# Patient Record
Sex: MALE | Race: WHITE | NOT HISPANIC OR LATINO
[De-identification: names, ages, dates, MRNs, and addresses within clinical notes are randomized per-mention and may not be internally consistent; named-entity substitution may affect disease eponyms.]

---

## 2017-11-30 ENCOUNTER — APPOINTMENT (OUTPATIENT)
Dept: NEUROLOGY | Facility: CLINIC | Age: 82
End: 2017-11-30

## 2018-03-01 ENCOUNTER — INPATIENT (INPATIENT)
Facility: HOSPITAL | Age: 83
LOS: 4 days | Discharge: HOME | DRG: 391 | End: 2018-03-06
Attending: INTERNAL MEDICINE | Admitting: INTERNAL MEDICINE
Payer: MEDICARE

## 2018-03-01 VITALS
HEIGHT: 71 IN | OXYGEN SATURATION: 97 % | SYSTOLIC BLOOD PRESSURE: 148 MMHG | RESPIRATION RATE: 16 BRPM | HEART RATE: 119 BPM | TEMPERATURE: 98 F | WEIGHT: 169.98 LBS | DIASTOLIC BLOOD PRESSURE: 87 MMHG

## 2018-03-01 DIAGNOSIS — K46.9 UNSPECIFIED ABDOMINAL HERNIA WITHOUT OBSTRUCTION OR GANGRENE: Chronic | ICD-10-CM

## 2018-03-01 DIAGNOSIS — K26.5 CHRONIC OR UNSPECIFIED DUODENAL ULCER WITH PERFORATION: ICD-10-CM

## 2018-03-01 DIAGNOSIS — N40.0 BENIGN PROSTATIC HYPERPLASIA WITHOUT LOWER URINARY TRACT SYMPTOMS: ICD-10-CM

## 2018-03-01 DIAGNOSIS — F41.9 ANXIETY DISORDER, UNSPECIFIED: ICD-10-CM

## 2018-03-01 DIAGNOSIS — K57.92 DIVERTICULITIS OF INTESTINE, PART UNSPECIFIED, WITHOUT PERFORATION OR ABSCESS WITHOUT BLEEDING: ICD-10-CM

## 2018-03-01 DIAGNOSIS — K63.9 DISEASE OF INTESTINE, UNSPECIFIED: ICD-10-CM

## 2018-03-01 LAB
ALBUMIN SERPL ELPH-MCNC: 3.2 G/DL — LOW (ref 3.3–5)
ALP SERPL-CCNC: 81 U/L — SIGNIFICANT CHANGE UP (ref 40–120)
ALT FLD-CCNC: 24 U/L — SIGNIFICANT CHANGE UP (ref 12–78)
AMYLASE P1 CFR SERPL: 30 U/L — SIGNIFICANT CHANGE UP (ref 25–115)
ANION GAP SERPL CALC-SCNC: 7 MMOL/L — SIGNIFICANT CHANGE UP (ref 5–17)
APPEARANCE UR: CLEAR — SIGNIFICANT CHANGE UP
AST SERPL-CCNC: 22 U/L — SIGNIFICANT CHANGE UP (ref 15–37)
BILIRUB SERPL-MCNC: 1.1 MG/DL — SIGNIFICANT CHANGE UP (ref 0.2–1.2)
BILIRUB UR-MCNC: NEGATIVE — SIGNIFICANT CHANGE UP
BUN SERPL-MCNC: 13 MG/DL — SIGNIFICANT CHANGE UP (ref 7–23)
CALCIUM SERPL-MCNC: 8.5 MG/DL — SIGNIFICANT CHANGE UP (ref 8.5–10.1)
CHLORIDE SERPL-SCNC: 111 MMOL/L — HIGH (ref 96–108)
CO2 SERPL-SCNC: 24 MMOL/L — SIGNIFICANT CHANGE UP (ref 22–31)
COLOR SPEC: YELLOW — SIGNIFICANT CHANGE UP
CREAT SERPL-MCNC: 1.3 MG/DL — SIGNIFICANT CHANGE UP (ref 0.5–1.3)
DIFF PNL FLD: NEGATIVE — SIGNIFICANT CHANGE UP
GLUCOSE SERPL-MCNC: 119 MG/DL — HIGH (ref 70–99)
GLUCOSE UR QL: NEGATIVE — SIGNIFICANT CHANGE UP
HCT VFR BLD CALC: 41.2 % — SIGNIFICANT CHANGE UP (ref 39–50)
HGB BLD-MCNC: 14.8 G/DL — SIGNIFICANT CHANGE UP (ref 13–17)
KETONES UR-MCNC: ABNORMAL
LEUKOCYTE ESTERASE UR-ACNC: ABNORMAL
LIDOCAIN IGE QN: 96 U/L — SIGNIFICANT CHANGE UP (ref 73–393)
MCHC RBC-ENTMCNC: 31.6 PG — SIGNIFICANT CHANGE UP (ref 27–34)
MCHC RBC-ENTMCNC: 35.8 GM/DL — SIGNIFICANT CHANGE UP (ref 32–36)
MCV RBC AUTO: 88.2 FL — SIGNIFICANT CHANGE UP (ref 80–100)
NITRITE UR-MCNC: NEGATIVE — SIGNIFICANT CHANGE UP
PH UR: 6.5 — SIGNIFICANT CHANGE UP (ref 5–8)
PLATELET # BLD AUTO: 203 K/UL — SIGNIFICANT CHANGE UP (ref 150–400)
POTASSIUM SERPL-MCNC: 3.4 MMOL/L — LOW (ref 3.5–5.3)
POTASSIUM SERPL-SCNC: 3.4 MMOL/L — LOW (ref 3.5–5.3)
PROCALCITONIN SERPL-MCNC: <0.05 — SIGNIFICANT CHANGE UP (ref 0–0.04)
PROT SERPL-MCNC: 6.5 G/DL — SIGNIFICANT CHANGE UP (ref 6–8.3)
PROT UR-MCNC: NEGATIVE — SIGNIFICANT CHANGE UP
RBC # BLD: 4.67 M/UL — SIGNIFICANT CHANGE UP (ref 4.2–5.8)
RBC # FLD: 12.1 % — SIGNIFICANT CHANGE UP (ref 10.3–14.5)
SODIUM SERPL-SCNC: 142 MMOL/L — SIGNIFICANT CHANGE UP (ref 135–145)
SP GR SPEC: 1.01 — SIGNIFICANT CHANGE UP (ref 1.01–1.02)
UROBILINOGEN FLD QL: NEGATIVE — SIGNIFICANT CHANGE UP
WBC # BLD: 9.9 K/UL — SIGNIFICANT CHANGE UP (ref 3.8–10.5)
WBC # FLD AUTO: 9.9 K/UL — SIGNIFICANT CHANGE UP (ref 3.8–10.5)

## 2018-03-01 PROCEDURE — 74177 CT ABD & PELVIS W/CONTRAST: CPT | Mod: 26

## 2018-03-01 PROCEDURE — 99285 EMERGENCY DEPT VISIT HI MDM: CPT

## 2018-03-01 PROCEDURE — 99223 1ST HOSP IP/OBS HIGH 75: CPT | Mod: AI

## 2018-03-01 RX ORDER — IOHEXOL 300 MG/ML
30 INJECTION, SOLUTION INTRAVENOUS ONCE
Qty: 0 | Refills: 0 | Status: COMPLETED | OUTPATIENT
Start: 2018-03-01 | End: 2018-03-01

## 2018-03-01 RX ORDER — FAMOTIDINE 10 MG/ML
20 INJECTION INTRAVENOUS ONCE
Qty: 0 | Refills: 0 | Status: COMPLETED | OUTPATIENT
Start: 2018-03-01 | End: 2018-03-01

## 2018-03-01 RX ORDER — ONDANSETRON 8 MG/1
4 TABLET, FILM COATED ORAL ONCE
Qty: 0 | Refills: 0 | Status: COMPLETED | OUTPATIENT
Start: 2018-03-01 | End: 2018-03-01

## 2018-03-01 RX ORDER — PIPERACILLIN AND TAZOBACTAM 4; .5 G/20ML; G/20ML
3.38 INJECTION, POWDER, LYOPHILIZED, FOR SOLUTION INTRAVENOUS ONCE
Qty: 0 | Refills: 0 | Status: COMPLETED | OUTPATIENT
Start: 2018-03-01 | End: 2018-03-01

## 2018-03-01 RX ORDER — BUPROPION HYDROCHLORIDE 150 MG/1
150 TABLET, EXTENDED RELEASE ORAL
Qty: 0 | Refills: 0 | Status: DISCONTINUED | OUTPATIENT
Start: 2018-03-01 | End: 2018-03-06

## 2018-03-01 RX ORDER — PIPERACILLIN AND TAZOBACTAM 4; .5 G/20ML; G/20ML
3.38 INJECTION, POWDER, LYOPHILIZED, FOR SOLUTION INTRAVENOUS EVERY 8 HOURS
Qty: 0 | Refills: 0 | Status: DISCONTINUED | OUTPATIENT
Start: 2018-03-01 | End: 2018-03-06

## 2018-03-01 RX ORDER — HEPARIN SODIUM 5000 [USP'U]/ML
5000 INJECTION INTRAVENOUS; SUBCUTANEOUS EVERY 12 HOURS
Qty: 0 | Refills: 0 | Status: DISCONTINUED | OUTPATIENT
Start: 2018-03-01 | End: 2018-03-06

## 2018-03-01 RX ORDER — SODIUM CHLORIDE 9 MG/ML
1000 INJECTION INTRAMUSCULAR; INTRAVENOUS; SUBCUTANEOUS ONCE
Qty: 0 | Refills: 0 | Status: COMPLETED | OUTPATIENT
Start: 2018-03-01 | End: 2018-03-01

## 2018-03-01 RX ORDER — TAMSULOSIN HYDROCHLORIDE 0.4 MG/1
0.4 CAPSULE ORAL AT BEDTIME
Qty: 0 | Refills: 0 | Status: DISCONTINUED | OUTPATIENT
Start: 2018-03-01 | End: 2018-03-06

## 2018-03-01 RX ORDER — PANTOPRAZOLE SODIUM 20 MG/1
40 TABLET, DELAYED RELEASE ORAL EVERY 12 HOURS
Qty: 0 | Refills: 0 | Status: DISCONTINUED | OUTPATIENT
Start: 2018-03-01 | End: 2018-03-05

## 2018-03-01 RX ORDER — ATORVASTATIN CALCIUM 80 MG/1
10 TABLET, FILM COATED ORAL AT BEDTIME
Qty: 0 | Refills: 0 | Status: DISCONTINUED | OUTPATIENT
Start: 2018-03-01 | End: 2018-03-06

## 2018-03-01 RX ORDER — ALPRAZOLAM 0.25 MG
0.5 TABLET ORAL THREE TIMES A DAY
Qty: 0 | Refills: 0 | Status: DISCONTINUED | OUTPATIENT
Start: 2018-03-01 | End: 2018-03-06

## 2018-03-01 RX ADMIN — SODIUM CHLORIDE 2000 MILLILITER(S): 9 INJECTION INTRAMUSCULAR; INTRAVENOUS; SUBCUTANEOUS at 10:42

## 2018-03-01 RX ADMIN — Medication 0.5 MILLIGRAM(S): at 22:28

## 2018-03-01 RX ADMIN — PANTOPRAZOLE SODIUM 40 MILLIGRAM(S): 20 TABLET, DELAYED RELEASE ORAL at 19:40

## 2018-03-01 RX ADMIN — ONDANSETRON 4 MILLIGRAM(S): 8 TABLET, FILM COATED ORAL at 10:44

## 2018-03-01 RX ADMIN — SODIUM CHLORIDE 2000 MILLILITER(S): 9 INJECTION INTRAMUSCULAR; INTRAVENOUS; SUBCUTANEOUS at 10:43

## 2018-03-01 RX ADMIN — TAMSULOSIN HYDROCHLORIDE 0.4 MILLIGRAM(S): 0.4 CAPSULE ORAL at 22:28

## 2018-03-01 RX ADMIN — IOHEXOL 30 MILLILITER(S): 300 INJECTION, SOLUTION INTRAVENOUS at 10:42

## 2018-03-01 RX ADMIN — ATORVASTATIN CALCIUM 10 MILLIGRAM(S): 80 TABLET, FILM COATED ORAL at 22:28

## 2018-03-01 RX ADMIN — PIPERACILLIN AND TAZOBACTAM 200 GRAM(S): 4; .5 INJECTION, POWDER, LYOPHILIZED, FOR SOLUTION INTRAVENOUS at 15:55

## 2018-03-01 RX ADMIN — HEPARIN SODIUM 5000 UNIT(S): 5000 INJECTION INTRAVENOUS; SUBCUTANEOUS at 19:41

## 2018-03-01 RX ADMIN — BUPROPION HYDROCHLORIDE 150 MILLIGRAM(S): 150 TABLET, EXTENDED RELEASE ORAL at 19:41

## 2018-03-01 RX ADMIN — PIPERACILLIN AND TAZOBACTAM 25 GRAM(S): 4; .5 INJECTION, POWDER, LYOPHILIZED, FOR SOLUTION INTRAVENOUS at 22:28

## 2018-03-01 RX ADMIN — FAMOTIDINE 20 MILLIGRAM(S): 10 INJECTION INTRAVENOUS at 10:43

## 2018-03-01 NOTE — CONSULT NOTE ADULT - PROBLEM SELECTOR RECOMMENDATION 9
PPI IV BID  Plan for upper gastrointestinal endoscopy  Zofran 4 mg IV q 6 hours prn N/V unclear if sigmoid thickening is a neoplasm versus diverticulitis   treat for diverticulitis prior to colonoscopy  clear liquid diet  IVF, IVAB  advance diet as tolerated

## 2018-03-01 NOTE — ED PROVIDER NOTE - OBJECTIVE STATEMENT
87 yo white male recently Dx and treated for diverticulitis x 2 courses but who now presents c/o few days of nausea, slight LLQ abdominal pain and generalized weakness and fatigue. Admit to clear liquid and soft low calorie diet since Dx and lost 14 pounds. No fever, chills, vomiting, diarrhea, cough, dysuria or hematuria.

## 2018-03-01 NOTE — CONSULT NOTE ADULT - ASSESSMENT
87 yo male with large hiatal hernia, and duodenal inflammation, CT concerning for perforation. PT symptoms not consistent with perforation at this time. DIverticulitis healed with poss sigmoid mass, last colonoscopy >5 years ago      -no acute surgical intervention       -poss endoscopy 3/5,  will hold off if poss ulcer or "early/contained" perf     -PPI     -will cont to follow

## 2018-03-01 NOTE — H&P ADULT - NSHPPHYSICALEXAM_GEN_ALL_CORE
PHYSICAL EXAM:  Vital Signs Last 24 Hrs  T(C): 36.4 (01 Mar 2018 09:16), Max: 36.4 (01 Mar 2018 09:16)  T(F): 97.6 (01 Mar 2018 09:16), Max: 97.6 (01 Mar 2018 09:16)  HR: 92 (01 Mar 2018 12:30) (92 - 119)  BP: 140/82 (01 Mar 2018 12:30) (140/82 - 148/87)  BP(mean): --  RR: 16 (01 Mar 2018 12:30) (16 - 16)  SpO2: 97% (01 Mar 2018 12:30) (97% - 97%)    GENERAL: NAD, well-groomed, well-developed  HEAD:  Atraumatic, Normocephalic  EYES: EOMI, PERRLA, conjunctiva and sclera clear  ENMT: No tonsillar erythema, exudates, or enlargement; Moist mucous membranes, Good dentition, No lesions  NECK: Supple, No JVD, Normal thyroid  NERVOUS SYSTEM:  Alert & Oriented X3,    CHEST/LUNG: Clear to auscultation bilaterally; No rales, rhonchi, wheezing, or rubs  HEART: Regular rate and rhythm; No murmurs, rubs, or gallops  ABDOMEN: ventral hernia, with left lower qaudrant pain   EXTREMITIES:  2+ Peripheral Pulses, No clubbing, cyanosis, or edema  LYMPH: No lymphadenopathy noted  SKIN: No rashes or lesions

## 2018-03-01 NOTE — H&P ADULT - PROBLEM SELECTOR PLAN 1
was treated as outpt  given the sigmoid mas and can not r/o recurrent diveritlicits, given left sided lower quadrant pain  started on iv zosyn  gi consultd was treated as outpt  given the sigmoid mas and can not r/o recurrent diveritlicits, given left sided lower quadrant pain  started on iv zosyn  bc times 2  procalcitonin   gi consultd

## 2018-03-01 NOTE — ED PROVIDER NOTE - CARE PLAN
Principal Discharge DX:	Duodenal ulcer with perforation but without obstruction  Secondary Diagnosis:	Sigmoid thickening

## 2018-03-01 NOTE — H&P ADULT - NSHPLABSRESULTS_GEN_ALL_CORE
14.8   9.9   )-----------( 203      ( 01 Mar 2018 10:03 )             41.2       03-01    142  |  111<H>  |  13  ----------------------------<  119<H>  3.4<L>   |  24  |  1.30    Ca    8.5      01 Mar 2018 10:03    TPro  6.5  /  Alb  3.2<L>  /  TBili  1.1  /  DBili  x   /  AST  22  /  ALT  24  /  AlkPhos  81  03-01              Urinalysis Basic - ( 01 Mar 2018 11:08 )    Color: Yellow / Appearance: Clear / S.010 / pH: x  Gluc: x / Ketone: Small  / Bili: Negative / Urobili: Negative   Blood: x / Protein: Negative / Nitrite: Negative   Leuk Esterase: Trace / RBC: Negative /HPF / WBC 0-2   Sq Epi: x / Non Sq Epi: Negative / Bacteria: Negative            Lactate Trend            CAPILLARY BLOOD GLUCOSE

## 2018-03-01 NOTE — CONSULT NOTE ADULT - PROBLEM SELECTOR RECOMMENDATION 2
Plan colonoscopy in am  clear liquids today, NPO after midnight, prep ordered PPI IV BID  Plan for upper gastrointestinal endoscopy next week  surgery eval  Zofran 4 mg IV q 6 hours prn N/V PPI IV BID  Plan for upper gastrointestinal endoscopy next week  surgery eval  Zofran 4 mg IV q 6 hours prn N/V  egd on Monday

## 2018-03-01 NOTE — H&P ADULT - NSHPREVIEWOFSYSTEMS_GEN_ALL_CORE
REVIEW OF SYSTEMS:  CONSTITUTIONAL: No fever, weight loss, or fatigue  EYES: No eye pain, visual disturbances, or discharge  ENMT:  No difficulty hearing, tinnitus, vertigo; No sinus or throat pain  NECK: No pain or stiffness  BREASTS: No pain, masses, or nipple discharge  RESPIRATORY: No cough, wheezing, chills or hemoptysis; No shortness of breath  CARDIOVASCULAR: No chest pain, palpitations, dizziness, or leg swelling  GASTROINTESTINAL: No abdominal or epigastric pain. No nausea, vomiting, or hematemesis; No diarrhea or constipation. No melena or hematochezia.  GENITOURINARY: No dysuria, frequency, hematuria, or incontinence  NEUROLOGICAL: No headaches, memory loss, loss of strength, numbness, or tremors  SKIN: No itching, burning, rashes, or lesions   LYMPH NODES: No enlarged glands  ENDOCRINE: No heat or cold intolerance; No hair loss; No polydipsia or polyuria  MUSCULOSKELETAL: No joint pain or swelling; No muscle, back, or extremity pain  PSYCHIATRIC: No depression, anxiety, mood swings, or difficulty sleeping  HEME/LYMPH: No easy bruising, or bleeding gums  ALLERGY AND IMMUNOLOGIC: No hives or eczema

## 2018-03-01 NOTE — CONSULT NOTE ADULT - SUBJECTIVE AND OBJECTIVE BOX
pt is a 87 yo male with recent hx of diverticulitis(3.5 weeks ago) presents with nausea.  PT was recent d/c from hospital and been treated outpt for diverticulitis when he had some nausea and abdominal pain.  Some dry heaving but no significant vomit.  +F+diarrhea, small amount. Hasnt been eating much lately.  No fver/chills    REVIEW OF SYSTEMS:    CONSTITUTIONAL:  decreased appetite  EYES: No visual changes; No double vision,  No vertigo, eye pain  Ears: no otalgia, no otorhea, no hearing loss, tinnitus  Nose: no epistaxis, rhinorrhea, sinus pressure  Throat: no throat pain, no oral lesions  NECK: No pain or stiffness  RESPIRATORY: No cough (productive or dry), wheezing, hemoptysis; No shortness of breath  CARDIOVASCULAR: No chest pain or palpitations,    GASTROINTESTINAL: as above  SKIN: No pruritis, rashes, lesions or new moles  Psych: No anxiety, sadness, insomnia,        Past medical hx  TIA (transient ischemic attack)  BPH (benign prostatic hyperplasia)  Anxiety  diverticulitis    Past surgical hx  R inguinal hernia repaired      Home Medications:  atorvastatin 10 mg oral tablet: 1 tab(s) orally once a day (at bedtime) (11 Jan 2016 16:15)  buPROPion 150 mg/12 hours (SR) oral tablet, extended release: 1 tab(s) orally 2 times a day (11 Jan 2016 16:15)  terazosin 10 mg oral tablet:  orally  (11 Jan 2016 16:15)  Xanax 0.5 mg oral tablet: 1 tab(s) orally 3 times a day (11 Jan 2016 16:15)    Allergies    No Known Allergies    Intolerances    SH: denies x3    .  VITAL SIGNS:  T(C): 36.4 (03-01-18 @ 09:16), Max: 36.4 (03-01-18 @ 09:16)  T(F): 97.6 (03-01-18 @ 09:16), Max: 97.6 (03-01-18 @ 09:16)  HR: 92 (03-01-18 @ 12:30) (92 - 119)  BP: 140/82 (03-01-18 @ 12:30) (140/82 - 148/87)  BP(mean): --  RR: 16 (03-01-18 @ 12:30) (16 - 16)  SpO2: 97% (03-01-18 @ 12:30) (97% - 97%)  Wt(kg): --    PHYSICAL EXAM:    Constitutional:  NAD, resting comfortably in bed  Head: NC/AT  Eyes: PERRL b/l  ENT: MMM  Neck: supple; no JVD or thyromegaly  Respiratory: CTA B/L   Cardiac: +S1/S2; RRR   Gastrointestinal: distended, soft, mild tenderness on deep palpation, -rebound, -guarding, umbilical hernia  Extremities: WWP, no clubbing or cyanosis; no peripheral edema  Musculoskeletal: NROM x4;   Neurologic: AAOx3; CNII-XII grossly intact; no focal deficits  Psychiatric: affect and characteristics of appearance, verbalizations, behaviors are appropriate                          14.8   9.9   )-----------( 203      ( 01 Mar 2018 10:03 )             41.2   03-01    142  |  111<H>  |  13  ----------------------------<  119<H>  3.4<L>   |  24  |  1.30    Ca    8.5      01 Mar 2018 10:03    TPro  6.5  /  Alb  3.2<L>  /  TBili  1.1  /  DBili  x   /  AST  22  /  ALT  24  /  AlkPhos  81  03-01  CT- < from: CT Abdomen and Pelvis w/ Oral Cont and w/ IV Cont (03.01.18 @ 12:46) >  Ulceration of the duodenal bulb with extensive wall thickening and   surrounding inflammatory change suspicious for early/contained   perforation. No extraluminal air or ascites.    2.8 cm asymmetric wall thickening in the sigmoid colon suspicious for   neoplasm. Follow-up colonoscopy is advised if not recently performed.
Chief Complaint:  Patient is a 88y old  Male who presents with a chief complaint of abdominal pain    HPI: 87 yo white male recently Dx and treated for diverticulitis x 2 courses but who now presents c/o few days of nausea, slight LLQ abdominal pain and generalized weakness and fatigue. Admit to clear liquid and soft low calorie diet since Dx and lost 14 pounds. No fever, chills, vomiting, diarrhea, cough, dysuria or hematuria. Last colonoscopy 5 years ago reportedly negative.     CT a/p with Ulceration of the duodenal bulb with extensive wall thickening and   surrounding inflammatory change suspicious for early/contained   perforation. No extraluminal air or ascites.  2.8 cm asymmetric wall thickening in the sigmoid colon suspicious for   neoplasm. Follow-up colonoscopy is advised if not recently performed.      Allergies:  No Known Allergies      Medications:      PMHX/PSHX:  TIA (transient ischemic attack)  BPH (benign prostatic hyperplasia)  Anxiety  Abdominal hernia      Family history:      Social History:     ROS:     General:  No wt loss, fevers, chills, night sweats, fatigue,   Eyes:  Good vision, no reported pain  ENT:  No sore throat, pain, runny nose, dysphagia  CV:  No pain, palpitations, hypo/hypertension  Resp:  No dyspnea, cough, tachypnea, wheezing  GI:  + epigastric pain, No nausea, No vomiting, No diarrhea, No constipation, No weight loss, No fever, No pruritis, No rectal bleeding, No tarry stools, No dysphagia,  :  No pain, bleeding, incontinence, nocturia  Muscle:  No pain, weakness  Neuro:  No weakness, tingling, memory problems  Psych:  No fatigue, insomnia, mood problems, depression  Endocrine:  No polyuria, polydipsia, cold/heat intolerance  Heme:  No petechiae, ecchymosis, easy bruisability  Skin:  No rash, tattoos, scars, edema      PHYSICAL EXAM:   Vital Signs:  Vital Signs Last 24 Hrs  T(C): 36.4 (01 Mar 2018 09:16), Max: 36.4 (01 Mar 2018 09:16)  T(F): 97.6 (01 Mar 2018 09:16), Max: 97.6 (01 Mar 2018 09:16)  HR: 119 (01 Mar 2018 09:16) (119 - 119)  BP: 148/87 (01 Mar 2018 09:16) (148/87 - 148/87)  BP(mean): --  RR: 16 (01 Mar 2018 09:16) (16 - 16)  SpO2: 97% (01 Mar 2018 09:16) (97% - 97%)  Daily Height in cm: 180.34 (01 Mar 2018 09:16)    Daily     GENERAL:  Appears stated age, well-groomed, well-nourished, no distress  HEENT:  NC/AT,  conjunctivae clear and pink, no thyromegaly, nodules, adenopathy, no JVD, sclera -anicteric  CHEST:  Full & symmetric excursion, no increased effort, breath sounds clear  HEART:  Regular rhythm, S1, S2, no murmur/rub/S3/S4, no abdominal bruit, no edema  ABDOMEN:  Soft, non-tender, mildly distended, normoactive bowel sounds,  no masses ,no hepato-splenomegaly, no signs of chronic liver disease  EXTREMITIES:  no cyanosis, clubbing or edema  SKIN:  No rash/erythema/ecchymoses/petechiae/wounds/abscess/warm/dry  NEURO:  Alert, oriented, no asterixis, no tremor, no encephalopathy    LABS:                        14.8   9.9   )-----------( 203      ( 01 Mar 2018 10:03 )             41.2     03-01    142  |  111<H>  |  13  ----------------------------<  119<H>  3.4<L>   |  24  |  1.30    Ca    8.5      01 Mar 2018 10:03    TPro  6.5  /  Alb  3.2<L>  /  TBili  1.1  /  DBili  x   /  AST  22  /  ALT  24  /  AlkPhos  81  03-01    LIVER FUNCTIONS - ( 01 Mar 2018 10:03 )  Alb: 3.2 g/dL / Pro: 6.5 g/dL / ALK PHOS: 81 U/L / ALT: 24 U/L / AST: 22 U/L / GGT: x             Urinalysis Basic - ( 01 Mar 2018 11:08 )    Color: Yellow / Appearance: Clear / S.010 / pH: x  Gluc: x / Ketone: Small  / Bili: Negative / Urobili: Negative   Blood: x / Protein: Negative / Nitrite: Negative   Leuk Esterase: Trace / RBC: Negative /HPF / WBC 0-2   Sq Epi: x / Non Sq Epi: Negative / Bacteria: Negative      Amylase Serum30      Lipase serum96       Ammonia--      Imaging:

## 2018-03-01 NOTE — CONSULT NOTE ADULT - ASSESSMENT
89 yo white male recently Dx and treated for diverticulitis x 2 courses but who now presents c/o few days of nausea, slight LLQ abdominal pain and generalized weakness. CT a/p with concern for neoplasm in sigmoid colon and duodenal bulb ulcer. 89 yo white male recently Dx and treated for diverticulitis x 2 courses but who now presents c/o few days of nausea, slight LLQ abdominal pain and generalized weakness. CT a/p with concern for neoplasm vs diverticulitis in sigmoid colon and duodenal bulb ulcer.

## 2018-03-01 NOTE — H&P ADULT - HISTORY OF PRESENT ILLNESS
87 yo white male with anxiety/depression/hld/bph  recently diagnosed  and treated for diverticulitis as outpt with  2 courses but who now presents c/o few days of nausea, slight LLQ abdominal pain and generalized weakness and fatigue, pt states has lost significant weight recently due to lack of appetite,   ct scan suggestive of ulceration of the duodenal bulb with extensive wall thickening and surrounding inflammatory change suspicious for early/contained perforation, and  2.8 cm asymmetric wall thickening in the sigmoid colon suspicious for neoplasm    as per pt has had choloscopy since the age of 50 every 5 years but hasnt went for repeat  colonoscopy about 8 years now

## 2018-03-02 LAB
ALBUMIN SERPL ELPH-MCNC: 2.6 G/DL — LOW (ref 3.3–5)
ALP SERPL-CCNC: 65 U/L — SIGNIFICANT CHANGE UP (ref 40–120)
ALT FLD-CCNC: 22 U/L — SIGNIFICANT CHANGE UP (ref 12–78)
ANION GAP SERPL CALC-SCNC: 8 MMOL/L — SIGNIFICANT CHANGE UP (ref 5–17)
AST SERPL-CCNC: 29 U/L — SIGNIFICANT CHANGE UP (ref 15–37)
BILIRUB SERPL-MCNC: 0.7 MG/DL — SIGNIFICANT CHANGE UP (ref 0.2–1.2)
BUN SERPL-MCNC: 11 MG/DL — SIGNIFICANT CHANGE UP (ref 7–23)
CALCIUM SERPL-MCNC: 7.7 MG/DL — LOW (ref 8.5–10.1)
CEA SERPL-MCNC: 2.1 NG/ML — SIGNIFICANT CHANGE UP (ref 0–3.8)
CHLORIDE SERPL-SCNC: 113 MMOL/L — HIGH (ref 96–108)
CO2 SERPL-SCNC: 24 MMOL/L — SIGNIFICANT CHANGE UP (ref 22–31)
CREAT SERPL-MCNC: 1.2 MG/DL — SIGNIFICANT CHANGE UP (ref 0.5–1.3)
GLUCOSE SERPL-MCNC: 82 MG/DL — SIGNIFICANT CHANGE UP (ref 70–99)
HCT VFR BLD CALC: 36 % — LOW (ref 39–50)
HGB BLD-MCNC: 12.4 G/DL — LOW (ref 13–17)
MCHC RBC-ENTMCNC: 30.9 PG — SIGNIFICANT CHANGE UP (ref 27–34)
MCHC RBC-ENTMCNC: 34.5 GM/DL — SIGNIFICANT CHANGE UP (ref 32–36)
MCV RBC AUTO: 89.7 FL — SIGNIFICANT CHANGE UP (ref 80–100)
PLATELET # BLD AUTO: 180 K/UL — SIGNIFICANT CHANGE UP (ref 150–400)
POTASSIUM SERPL-MCNC: 3.2 MMOL/L — LOW (ref 3.5–5.3)
POTASSIUM SERPL-SCNC: 3.2 MMOL/L — LOW (ref 3.5–5.3)
PROT SERPL-MCNC: 5.3 G/DL — LOW (ref 6–8.3)
RBC # BLD: 4.01 M/UL — LOW (ref 4.2–5.8)
RBC # FLD: 12.4 % — SIGNIFICANT CHANGE UP (ref 10.3–14.5)
SODIUM SERPL-SCNC: 145 MMOL/L — SIGNIFICANT CHANGE UP (ref 135–145)
WBC # BLD: 7 K/UL — SIGNIFICANT CHANGE UP (ref 3.8–10.5)
WBC # FLD AUTO: 7 K/UL — SIGNIFICANT CHANGE UP (ref 3.8–10.5)

## 2018-03-02 PROCEDURE — 99233 SBSQ HOSP IP/OBS HIGH 50: CPT

## 2018-03-02 RX ORDER — SODIUM CHLORIDE 9 MG/ML
1000 INJECTION INTRAMUSCULAR; INTRAVENOUS; SUBCUTANEOUS
Qty: 0 | Refills: 0 | Status: DISCONTINUED | OUTPATIENT
Start: 2018-03-02 | End: 2018-03-03

## 2018-03-02 RX ORDER — POTASSIUM CHLORIDE 20 MEQ
40 PACKET (EA) ORAL EVERY 4 HOURS
Qty: 0 | Refills: 0 | Status: COMPLETED | OUTPATIENT
Start: 2018-03-02 | End: 2018-03-02

## 2018-03-02 RX ADMIN — HEPARIN SODIUM 5000 UNIT(S): 5000 INJECTION INTRAVENOUS; SUBCUTANEOUS at 05:50

## 2018-03-02 RX ADMIN — PANTOPRAZOLE SODIUM 40 MILLIGRAM(S): 20 TABLET, DELAYED RELEASE ORAL at 18:02

## 2018-03-02 RX ADMIN — PANTOPRAZOLE SODIUM 40 MILLIGRAM(S): 20 TABLET, DELAYED RELEASE ORAL at 05:50

## 2018-03-02 RX ADMIN — Medication 0.5 MILLIGRAM(S): at 22:18

## 2018-03-02 RX ADMIN — Medication 40 MILLIEQUIVALENT(S): at 13:22

## 2018-03-02 RX ADMIN — PIPERACILLIN AND TAZOBACTAM 25 GRAM(S): 4; .5 INJECTION, POWDER, LYOPHILIZED, FOR SOLUTION INTRAVENOUS at 05:50

## 2018-03-02 RX ADMIN — Medication 40 MILLIEQUIVALENT(S): at 10:18

## 2018-03-02 RX ADMIN — HEPARIN SODIUM 5000 UNIT(S): 5000 INJECTION INTRAVENOUS; SUBCUTANEOUS at 18:02

## 2018-03-02 RX ADMIN — SODIUM CHLORIDE 50 MILLILITER(S): 9 INJECTION INTRAMUSCULAR; INTRAVENOUS; SUBCUTANEOUS at 10:18

## 2018-03-02 RX ADMIN — BUPROPION HYDROCHLORIDE 150 MILLIGRAM(S): 150 TABLET, EXTENDED RELEASE ORAL at 18:18

## 2018-03-02 RX ADMIN — Medication 0.5 MILLIGRAM(S): at 13:22

## 2018-03-02 RX ADMIN — PIPERACILLIN AND TAZOBACTAM 25 GRAM(S): 4; .5 INJECTION, POWDER, LYOPHILIZED, FOR SOLUTION INTRAVENOUS at 22:04

## 2018-03-02 RX ADMIN — PIPERACILLIN AND TAZOBACTAM 25 GRAM(S): 4; .5 INJECTION, POWDER, LYOPHILIZED, FOR SOLUTION INTRAVENOUS at 13:22

## 2018-03-02 RX ADMIN — TAMSULOSIN HYDROCHLORIDE 0.4 MILLIGRAM(S): 0.4 CAPSULE ORAL at 22:17

## 2018-03-02 RX ADMIN — BUPROPION HYDROCHLORIDE 150 MILLIGRAM(S): 150 TABLET, EXTENDED RELEASE ORAL at 05:50

## 2018-03-02 RX ADMIN — Medication 0.5 MILLIGRAM(S): at 05:49

## 2018-03-02 RX ADMIN — ATORVASTATIN CALCIUM 10 MILLIGRAM(S): 80 TABLET, FILM COATED ORAL at 22:17

## 2018-03-02 NOTE — PROGRESS NOTE ADULT - SUBJECTIVE AND OBJECTIVE BOX
pt seen  feeling good  some gas pains, but pain from yesterday resolved  -n-v  +F-bm  ICU Vital Signs Last 24 Hrs  T(C): 36.6 (02 Mar 2018 06:44), Max: 36.8 (01 Mar 2018 19:13)  T(F): 97.8 (02 Mar 2018 06:44), Max: 98.3 (01 Mar 2018 19:13)  HR: 81 (02 Mar 2018 06:44) (81 - 105)  BP: 160/87 (02 Mar 2018 06:44) (138/80 - 160/87)  BP(mean): --  ABP: --  ABP(mean): --  RR: 17 (02 Mar 2018 06:44) (16 - 18)  SpO2: 98% (02 Mar 2018 06:44) (97% - 98%)  gen- NAD, in chair comfortable  resp-clear b/l  CVS-reg rate and rhythm  abd- slight distension, soft NT, +BS  ext- no edema, nontender                        12.4   7.0   )-----------( 180      ( 02 Mar 2018 06:45 )             36.0     03-02    145  |  113<H>  |  11  ----------------------------<  82  3.2<L>   |  24  |  1.20    Ca    7.7<L>      02 Mar 2018 06:45    TPro  5.3<L>  /  Alb  2.6<L>  /  TBili  0.7  /  DBili  x   /  AST  29  /  ALT  22  /  AlkPhos  65  03-02

## 2018-03-02 NOTE — PROGRESS NOTE ADULT - SUBJECTIVE AND OBJECTIVE BOX
Interval Events: no acute overnight events. pt denies abdominal pain, nausea, vomiting, diarrhea, constipation, BRBPR, melena.     HPI: 87 yo white male recently Dx and treated for diverticulitis x 2 courses but who now presents c/o few days of nausea, slight LLQ abdominal pain and generalized weakness and fatigue. Admit to clear liquid and soft low calorie diet since Dx and lost 14 pounds. No fever, chills, vomiting, diarrhea, cough, dysuria or hematuria. Last colonoscopy 5 years ago reportedly negative.     CT a/p with Ulceration of the duodenal bulb with extensive wall thickening and   surrounding inflammatory change suspicious for early/contained   perforation. No extraluminal air or ascites.  2.8 cm asymmetric wall thickening in the sigmoid colon suspicious for   neoplasm. Follow-up colonoscopy is advised if not recently performed.    MEDICATIONS  (STANDING):  ALPRAZolam 0.5 milliGRAM(s) Oral three times a day  atorvastatin 10 milliGRAM(s) Oral at bedtime  buPROPion SR (12-Hour) Oral Tab/Cap - Peds 150 milliGRAM(s) Oral two times a day  heparin  Injectable 5000 Unit(s) SubCutaneous every 12 hours  pantoprazole  Injectable 40 milliGRAM(s) IV Push every 12 hours  piperacillin/tazobactam IVPB. 3.375 Gram(s) IV Intermittent every 8 hours  sodium chloride 0.9%. 1000 milliLiter(s) (50 mL/Hr) IV Continuous <Continuous>  tamsulosin 0.4 milliGRAM(s) Oral at bedtime    MEDICATIONS  (PRN):      Allergies    No Known Allergies    Intolerances        Review of Systems:    General:  No wt loss, fevers, chills, night sweats,fatigue,   Eyes:  Good vision, no reported pain  ENT:  No sore throat, pain, runny nose, dysphagia  CV:  No pain, palpitations, hypo/hypertension  Resp:  No dyspnea, cough, tachypnea, wheezing  GI:  No pain, No nausea, No vomiting, No diarrhea, No constipation, No weight loss, No fever, No pruritis, No rectal bleeding, No melena, No dysphagia  :  No pain, bleeding, incontinence, nocturia  Muscle:  No pain, weakness  Neuro:  No weakness, tingling, memory problems  Psych:  No fatigue, insomnia, mood problems, depression  Endocrine:  No polyuria, polydypsia, cold/heat intolerance  Heme:  No petechiae, ecchymosis, easy bruisability  Skin:  No rash, tattoos, scars, edema      Vital Signs Last 24 Hrs  T(C): 36.6 (02 Mar 2018 06:44), Max: 36.8 (01 Mar 2018 19:13)  T(F): 97.8 (02 Mar 2018 06:44), Max: 98.3 (01 Mar 2018 19:13)  HR: 81 (02 Mar 2018 06:44) (81 - 105)  BP: 160/87 (02 Mar 2018 06:44) (138/80 - 160/87)  BP(mean): --  RR: 17 (02 Mar 2018 06:44) (16 - 18)  SpO2: 98% (02 Mar 2018 06:44) (97% - 98%)    PHYSICAL EXAM:    Constitutional: NAD, well-developed  HEENT: EOMI, throat clear  Neck: No LAD, supple  Respiratory: CTA and P  Cardiovascular: S1 and S2, RRR, no M  Gastrointestinal: BS+, soft, NT/ND, neg HSM,  Extremities: No peripheral edema, neg clubing, cyanosis  Vascular: 2+ peripheral pulses  Neurological: A/O x 3, no focal deficits  Psychiatric: Normal mood, normal affect  Skin: No rashes      LABS:                        12.4   7.0   )-----------( 180      ( 02 Mar 2018 06:45 )             36.0     03-02    145  |  113<H>  |  11  ----------------------------<  82  3.2<L>   |  24  |  1.20    Ca    7.7<L>      02 Mar 2018 06:45    TPro  5.3<L>  /  Alb  2.6<L>  /  TBili  0.7  /  DBili  x   /  AST  29  /  ALT  22  /  AlkPhos  65  03-02      Urinalysis Basic - ( 01 Mar 2018 11:08 )    Color: Yellow / Appearance: Clear / S.010 / pH: x  Gluc: x / Ketone: Small  / Bili: Negative / Urobili: Negative   Blood: x / Protein: Negative / Nitrite: Negative   Leuk Esterase: Trace / RBC: Negative /HPF / WBC 0-2   Sq Epi: x / Non Sq Epi: Negative / Bacteria: Negative        RADIOLOGY & ADDITIONAL TESTS:

## 2018-03-02 NOTE — DIETITIAN INITIAL EVALUATION ADULT. - OTHER INFO
Pt reports loss of about 7# past 5 weeks. UBW ~176#. Reported height of 5'10".  Lives with wife, eats about 3 meals per day usually. Black coffee and eggs for bfst, leftovers for lunch and dinner varies, whatever she makes.  Reviewed list of foods to avoid due to ulcer with pt. Will provide written materials as well.   Pt reports taking baby ASA regularly and had been taking some ibuprofen recently but switched to Tylenol.  Tolerating clear liquids, ate most of tray from bfst.

## 2018-03-02 NOTE — PROGRESS NOTE ADULT - PROBLEM SELECTOR PLAN 1
unclear if sigmoid thickening is a neoplasm versus diverticulitis   treat for diverticulitis prior to colonoscopy  clear liquid diet  IVF, IVAB  advance diet as tolerated. unclear if sigmoid thickening is a neoplasm versus diverticulitis   treat for diverticulitis prior to colonoscopy  IVF, IVAB  diet advanced to low residue

## 2018-03-02 NOTE — DIETITIAN INITIAL EVALUATION ADULT. - PROBLEM SELECTOR PLAN 1
was treated as outpt  given the sigmoid mas and can not r/o recurrent diveritlicits, given left sided lower quadrant pain  started on iv zosyn  bc times 2  procalcitonin   gi consultd

## 2018-03-03 DIAGNOSIS — E78.5 HYPERLIPIDEMIA, UNSPECIFIED: ICD-10-CM

## 2018-03-03 DIAGNOSIS — Z29.9 ENCOUNTER FOR PROPHYLACTIC MEASURES, UNSPECIFIED: ICD-10-CM

## 2018-03-03 DIAGNOSIS — K59.00 CONSTIPATION, UNSPECIFIED: ICD-10-CM

## 2018-03-03 LAB
ANION GAP SERPL CALC-SCNC: 8 MMOL/L — SIGNIFICANT CHANGE UP (ref 5–17)
BASOPHILS # BLD AUTO: 0.1 K/UL — SIGNIFICANT CHANGE UP (ref 0–0.2)
BASOPHILS NFR BLD AUTO: 1.1 % — SIGNIFICANT CHANGE UP (ref 0–2)
BUN SERPL-MCNC: 11 MG/DL — SIGNIFICANT CHANGE UP (ref 7–23)
CALCIUM SERPL-MCNC: 8.2 MG/DL — LOW (ref 8.5–10.1)
CHLORIDE SERPL-SCNC: 114 MMOL/L — HIGH (ref 96–108)
CO2 SERPL-SCNC: 23 MMOL/L — SIGNIFICANT CHANGE UP (ref 22–31)
CREAT SERPL-MCNC: 1.2 MG/DL — SIGNIFICANT CHANGE UP (ref 0.5–1.3)
EOSINOPHIL # BLD AUTO: 0.5 K/UL — SIGNIFICANT CHANGE UP (ref 0–0.5)
EOSINOPHIL NFR BLD AUTO: 7.5 % — HIGH (ref 0–6)
GLUCOSE SERPL-MCNC: 83 MG/DL — SIGNIFICANT CHANGE UP (ref 70–99)
HCT VFR BLD CALC: 36 % — LOW (ref 39–50)
HGB BLD-MCNC: 12.4 G/DL — LOW (ref 13–17)
LYMPHOCYTES # BLD AUTO: 1 K/UL — SIGNIFICANT CHANGE UP (ref 1–3.3)
LYMPHOCYTES # BLD AUTO: 15.1 % — SIGNIFICANT CHANGE UP (ref 13–44)
MAGNESIUM SERPL-MCNC: 2 MG/DL — SIGNIFICANT CHANGE UP (ref 1.6–2.6)
MCHC RBC-ENTMCNC: 30.7 PG — SIGNIFICANT CHANGE UP (ref 27–34)
MCHC RBC-ENTMCNC: 34.4 GM/DL — SIGNIFICANT CHANGE UP (ref 32–36)
MCV RBC AUTO: 89.4 FL — SIGNIFICANT CHANGE UP (ref 80–100)
MONOCYTES # BLD AUTO: 0.5 K/UL — SIGNIFICANT CHANGE UP (ref 0–0.9)
MONOCYTES NFR BLD AUTO: 8.3 % — SIGNIFICANT CHANGE UP (ref 1–9)
NEUTROPHILS # BLD AUTO: 4.4 K/UL — SIGNIFICANT CHANGE UP (ref 1.8–7.4)
NEUTROPHILS NFR BLD AUTO: 68 % — SIGNIFICANT CHANGE UP (ref 43–77)
PLATELET # BLD AUTO: 179 K/UL — SIGNIFICANT CHANGE UP (ref 150–400)
POTASSIUM SERPL-MCNC: 3.6 MMOL/L — SIGNIFICANT CHANGE UP (ref 3.5–5.3)
POTASSIUM SERPL-SCNC: 3.6 MMOL/L — SIGNIFICANT CHANGE UP (ref 3.5–5.3)
RBC # BLD: 4.03 M/UL — LOW (ref 4.2–5.8)
RBC # FLD: 12.3 % — SIGNIFICANT CHANGE UP (ref 10.3–14.5)
SODIUM SERPL-SCNC: 145 MMOL/L — SIGNIFICANT CHANGE UP (ref 135–145)
WBC # BLD: 6.5 K/UL — SIGNIFICANT CHANGE UP (ref 3.8–10.5)
WBC # FLD AUTO: 6.5 K/UL — SIGNIFICANT CHANGE UP (ref 3.8–10.5)

## 2018-03-03 PROCEDURE — 99233 SBSQ HOSP IP/OBS HIGH 50: CPT

## 2018-03-03 RX ADMIN — BUPROPION HYDROCHLORIDE 150 MILLIGRAM(S): 150 TABLET, EXTENDED RELEASE ORAL at 18:00

## 2018-03-03 RX ADMIN — PIPERACILLIN AND TAZOBACTAM 25 GRAM(S): 4; .5 INJECTION, POWDER, LYOPHILIZED, FOR SOLUTION INTRAVENOUS at 05:17

## 2018-03-03 RX ADMIN — BUPROPION HYDROCHLORIDE 150 MILLIGRAM(S): 150 TABLET, EXTENDED RELEASE ORAL at 05:19

## 2018-03-03 RX ADMIN — PIPERACILLIN AND TAZOBACTAM 25 GRAM(S): 4; .5 INJECTION, POWDER, LYOPHILIZED, FOR SOLUTION INTRAVENOUS at 22:30

## 2018-03-03 RX ADMIN — PANTOPRAZOLE SODIUM 40 MILLIGRAM(S): 20 TABLET, DELAYED RELEASE ORAL at 18:00

## 2018-03-03 RX ADMIN — HEPARIN SODIUM 5000 UNIT(S): 5000 INJECTION INTRAVENOUS; SUBCUTANEOUS at 05:17

## 2018-03-03 RX ADMIN — ATORVASTATIN CALCIUM 10 MILLIGRAM(S): 80 TABLET, FILM COATED ORAL at 22:30

## 2018-03-03 RX ADMIN — Medication 0.5 MILLIGRAM(S): at 13:51

## 2018-03-03 RX ADMIN — HEPARIN SODIUM 5000 UNIT(S): 5000 INJECTION INTRAVENOUS; SUBCUTANEOUS at 18:00

## 2018-03-03 RX ADMIN — TAMSULOSIN HYDROCHLORIDE 0.4 MILLIGRAM(S): 0.4 CAPSULE ORAL at 22:30

## 2018-03-03 RX ADMIN — PIPERACILLIN AND TAZOBACTAM 25 GRAM(S): 4; .5 INJECTION, POWDER, LYOPHILIZED, FOR SOLUTION INTRAVENOUS at 13:51

## 2018-03-03 RX ADMIN — Medication 1 ENEMA: at 11:09

## 2018-03-03 RX ADMIN — Medication 0.5 MILLIGRAM(S): at 05:18

## 2018-03-03 RX ADMIN — PANTOPRAZOLE SODIUM 40 MILLIGRAM(S): 20 TABLET, DELAYED RELEASE ORAL at 05:17

## 2018-03-03 RX ADMIN — Medication 0.5 MILLIGRAM(S): at 22:36

## 2018-03-03 NOTE — PROGRESS NOTE ADULT - PROBLEM SELECTOR PLAN 1
unclear if sigmoid thickening is a neoplasm versus diverticulitis   treat for diverticulitis prior to colonoscopy  IVF, IVAB  diet advanced to low residue

## 2018-03-03 NOTE — PROGRESS NOTE ADULT - SUBJECTIVE AND OBJECTIVE BOX
Patient is a 88y old  Male who presents with a chief complaint of LLQ abdominal pain and fatigue (01 Mar 2018 16:47)      INTERVAL HPI/OVERNIGHT EVENTS: pt states LLQ pain is very mild. Had small BM today. Denies nausea, vomiting, fever, chills.     MEDICATIONS  (STANDING):  ALPRAZolam 0.5 milliGRAM(s) Oral three times a day  atorvastatin 10 milliGRAM(s) Oral at bedtime  buPROPion SR (12-Hour) Oral Tab/Cap - Peds 150 milliGRAM(s) Oral two times a day  heparin  Injectable 5000 Unit(s) SubCutaneous every 12 hours  pantoprazole  Injectable 40 milliGRAM(s) IV Push every 12 hours  piperacillin/tazobactam IVPB. 3.375 Gram(s) IV Intermittent every 8 hours  tamsulosin 0.4 milliGRAM(s) Oral at bedtime    MEDICATIONS  (PRN):      Allergies    No Known Allergies    Intolerances        REVIEW OF SYSTEMS:  CONSTITUTIONAL: No fever or chills  HEENT:  No headache, no sore throat  RESPIRATORY: No cough, wheezing, or shortness of breath  CARDIOVASCULAR: No chest pain, palpitations, or leg swelling  GASTROINTESTINAL: mild LLQ abd pain, No nausea, vomiting, or diarrhea  GENITOURINARY: No dysuria, frequency, or hematuria  NEUROLOGICAL: no focal weakness or dizziness  MUSCULOSKELETAL: no myalgias     Vital Signs Last 24 Hrs  T(C): 36.6 (03 Mar 2018 13:51), Max: 36.7 (02 Mar 2018 21:13)  T(F): 97.8 (03 Mar 2018 13:51), Max: 98 (02 Mar 2018 21:13)  HR: 84 (03 Mar 2018 13:51) (73 - 84)  BP: 158/86 (03 Mar 2018 13:51) (131/98 - 177/98)  BP(mean): --  RR: 18 (03 Mar 2018 05:50) (16 - 18)  SpO2: 99% (03 Mar 2018 13:51) (98% - 99%)    PHYSICAL EXAM:  GENERAL: NAD  HEENT:  EOMI, moist mucous membranes  CHEST/LUNG:  CTA b/l, no rales, wheezes, or rhonchi  HEART:  RRR, S1, S2  ABDOMEN:  BS+, soft, + very mild tenderness to palpation in LLQ without guarding, nondistended  EXTREMITIES: no edema, cyanosis, or calf tenderness  NERVOUS SYSTEM: answers questions and follows commands appropriately     LABS:                        12.4   6.5   )-----------( 179      ( 03 Mar 2018 09:57 )             36.0     CBC Full  -  ( 03 Mar 2018 09:57 )  WBC Count : 6.5 K/uL  Hemoglobin : 12.4 g/dL  Hematocrit : 36.0 %  Platelet Count - Automated : 179 K/uL  Mean Cell Volume : 89.4 fl  Mean Cell Hemoglobin : 30.7 pg  Mean Cell Hemoglobin Concentration : 34.4 gm/dL  Auto Neutrophil # : 4.4 K/uL  Auto Lymphocyte # : 1.0 K/uL  Auto Monocyte # : 0.5 K/uL  Auto Eosinophil # : 0.5 K/uL  Auto Basophil # : 0.1 K/uL  Auto Neutrophil % : 68.0 %  Auto Lymphocyte % : 15.1 %  Auto Monocyte % : 8.3 %  Auto Eosinophil % : 7.5 %  Auto Basophil % : 1.1 %    03 Mar 2018 09:57    145    |  114    |  11     ----------------------------<  83     3.6     |  23     |  1.20     Ca    8.2        03 Mar 2018 09:57  Mg     2.0       03 Mar 2018 09:57          CAPILLARY BLOOD GLUCOSE            Culture - Blood (collected 03-01-18 @ 21:33)  Source: .Blood Blood-Peripheral  Preliminary Report (03-02-18 @ 22:01):    No growth to date.    Culture - Blood (collected 03-01-18 @ 21:33)  Source: .Blood Blood-Peripheral  Preliminary Report (03-02-18 @ 22:01):    No growth to date.        RADIOLOGY & ADDITIONAL TESTS:  CT Abd/P: Ulceration of the duodenal bulb with extensive wall thickening and surrounding inflammatory change suspicious for early/contained perforation. No extraluminal air or ascites.  2.8 cm asymmetric wall thickening in the sigmoid colon suspicious for neoplasm. Follow-up colonoscopy is advised if not recently performed.    Personally reviewed.     Consultant(s) Notes Reviewed:  [x] YES  [ ] NO

## 2018-03-03 NOTE — PROGRESS NOTE ADULT - SUBJECTIVE AND OBJECTIVE BOX
Interval Events: no acute overnight events. Patient complains of constipation and abd pain. Denies n/v     HPI: 89 yo white male recently Dx and treated for diverticulitis x 2 courses but who now presents c/o few days of nausea, slight LLQ abdominal pain and generalized weakness and fatigue. Admit to clear liquid and soft low calorie diet since Dx and lost 14 pounds. No fever, chills, vomiting, diarrhea, cough, dysuria or hematuria. Last colonoscopy 5 years ago reportedly negative.     CT a/p with Ulceration of the duodenal bulb with extensive wall thickening and   surrounding inflammatory change suspicious for early/contained   perforation. No extraluminal air or ascites.  2.8 cm asymmetric wall thickening in the sigmoid colon suspicious for   neoplasm. Follow-up colonoscopy is advised if not recently performed.    MEDICATIONS  (STANDING):  ALPRAZolam 0.5 milliGRAM(s) Oral three times a day  atorvastatin 10 milliGRAM(s) Oral at bedtime  buPROPion SR (12-Hour) Oral Tab/Cap - Peds 150 milliGRAM(s) Oral two times a day  heparin  Injectable 5000 Unit(s) SubCutaneous every 12 hours  pantoprazole  Injectable 40 milliGRAM(s) IV Push every 12 hours  piperacillin/tazobactam IVPB. 3.375 Gram(s) IV Intermittent every 8 hours  sodium chloride 0.9%. 1000 milliLiter(s) (50 mL/Hr) IV Continuous <Continuous>  tamsulosin 0.4 milliGRAM(s) Oral at bedtime    MEDICATIONS  (PRN):      Allergies    No Known Allergies    Intolerances        Review of Systems:    General:  No wt loss, fevers, chills, night sweats,fatigue,   Eyes:  Good vision, no reported pain  ENT:  No sore throat, pain, runny nose, dysphagia  CV:  No pain, palpitations, hypo/hypertension  Resp:  No dyspnea, cough, tachypnea, wheezing  GI:  No pain, No nausea, No vomiting, No diarrhea, No constipation, No weight loss, No fever, No pruritis, No rectal bleeding, No melena, No dysphagia  :  No pain, bleeding, incontinence, nocturia  Muscle:  No pain, weakness  Neuro:  No weakness, tingling, memory problems  Psych:  No fatigue, insomnia, mood problems, depression  Endocrine:  No polyuria, polydypsia, cold/heat intolerance  Heme:  No petechiae, ecchymosis, easy bruisability  Skin:  No rash, tattoos, scars, edema      Vital Signs Last 24 Hrs  T(C): 36.4 (03 Mar 2018 05:50), Max: 36.7 (02 Mar 2018 21:13)  T(F): 97.5 (03 Mar 2018 05:50), Max: 98 (02 Mar 2018 21:13)  HR: 77 (03 Mar 2018 05:50) (73 - 92)  BP: 177/98 (03 Mar 2018 05:50) (131/98 - 177/98)  BP(mean): --  RR: 18 (03 Mar 2018 05:50) (16 - 18)  SpO2: 98% (03 Mar 2018 05:50) (98% - 100%)    PHYSICAL EXAM:    Constitutional: NAD, well-developed  HEENT: EOMI, throat clear  Neck: No LAD, supple  Respiratory: CTA and P  Cardiovascular: S1 and S2, RRR, no M  Gastrointestinal: BS+, soft, NT/ND, neg HSM,  Extremities: No peripheral edema, neg clubing, cyanosis  Vascular: 2+ peripheral pulses  Neurological: A/O x 3, no focal deficits  Psychiatric: Normal mood, normal affect  Skin: No rashes      LABS:                                              12.4   7.0   )-----------( 180      ( 02 Mar 2018 06:45 )             36.0   03-02    145  |  113<H>  |  11  ----------------------------<  82  3.2<L>   |  24  |  1.20    Ca    7.7<L>      02 Mar 2018 06:45    TPro  5.3<L>  /  Alb  2.6<L>  /  TBili  0.7  /  DBili  x   /  AST  29  /  ALT  22  /  AlkPhos  65  03-02        Urinalysis Basic - ( 01 Mar 2018 11:08 )    Color: Yellow / Appearance: Clear / S.010 / pH: x  Gluc: x / Ketone: Small  / Bili: Negative / Urobili: Negative   Blood: x / Protein: Negative / Nitrite: Negative   Leuk Esterase: Trace / RBC: Negative /HPF / WBC 0-2   Sq Epi: x / Non Sq Epi: Negative / Bacteria: Negative        RADIOLOGY & ADDITIONAL TESTS:

## 2018-03-03 NOTE — PROGRESS NOTE ADULT - PROBLEM SELECTOR PLAN 1
had some treatment as outpt - pt could not elaborate on that today on interview  c/w zosyn for now for presumed diverticulitis, as per discussion with GI, Dr. Leary today plan for colonoscopy as outpt in 2-3 weeks.  consider de-escalating to Augmentin soon  BCx negative  f/up GI recs

## 2018-03-04 DIAGNOSIS — E87.6 HYPOKALEMIA: ICD-10-CM

## 2018-03-04 LAB
ANION GAP SERPL CALC-SCNC: 8 MMOL/L — SIGNIFICANT CHANGE UP (ref 5–17)
BUN SERPL-MCNC: 11 MG/DL — SIGNIFICANT CHANGE UP (ref 7–23)
CALCIUM SERPL-MCNC: 8.6 MG/DL — SIGNIFICANT CHANGE UP (ref 8.5–10.1)
CHLORIDE SERPL-SCNC: 108 MMOL/L — SIGNIFICANT CHANGE UP (ref 96–108)
CO2 SERPL-SCNC: 28 MMOL/L — SIGNIFICANT CHANGE UP (ref 22–31)
CREAT SERPL-MCNC: 1.4 MG/DL — HIGH (ref 0.5–1.3)
GLUCOSE SERPL-MCNC: 81 MG/DL — SIGNIFICANT CHANGE UP (ref 70–99)
HCT VFR BLD CALC: 37.8 % — LOW (ref 39–50)
HGB BLD-MCNC: 13.3 G/DL — SIGNIFICANT CHANGE UP (ref 13–17)
MCHC RBC-ENTMCNC: 31.5 PG — SIGNIFICANT CHANGE UP (ref 27–34)
MCHC RBC-ENTMCNC: 35.2 GM/DL — SIGNIFICANT CHANGE UP (ref 32–36)
MCV RBC AUTO: 89.5 FL — SIGNIFICANT CHANGE UP (ref 80–100)
PLATELET # BLD AUTO: 191 K/UL — SIGNIFICANT CHANGE UP (ref 150–400)
POTASSIUM SERPL-MCNC: 3.2 MMOL/L — LOW (ref 3.5–5.3)
POTASSIUM SERPL-SCNC: 3.2 MMOL/L — LOW (ref 3.5–5.3)
RBC # BLD: 4.22 M/UL — SIGNIFICANT CHANGE UP (ref 4.2–5.8)
RBC # FLD: 12.3 % — SIGNIFICANT CHANGE UP (ref 10.3–14.5)
SODIUM SERPL-SCNC: 144 MMOL/L — SIGNIFICANT CHANGE UP (ref 135–145)
WBC # BLD: 5.6 K/UL — SIGNIFICANT CHANGE UP (ref 3.8–10.5)
WBC # FLD AUTO: 5.6 K/UL — SIGNIFICANT CHANGE UP (ref 3.8–10.5)

## 2018-03-04 PROCEDURE — 99233 SBSQ HOSP IP/OBS HIGH 50: CPT

## 2018-03-04 RX ORDER — POTASSIUM CHLORIDE 20 MEQ
40 PACKET (EA) ORAL EVERY 6 HOURS
Qty: 0 | Refills: 0 | Status: COMPLETED | OUTPATIENT
Start: 2018-03-04 | End: 2018-03-04

## 2018-03-04 RX ORDER — SODIUM CHLORIDE 9 MG/ML
1000 INJECTION, SOLUTION INTRAVENOUS
Qty: 0 | Refills: 0 | Status: DISCONTINUED | OUTPATIENT
Start: 2018-03-04 | End: 2018-03-05

## 2018-03-04 RX ADMIN — PANTOPRAZOLE SODIUM 40 MILLIGRAM(S): 20 TABLET, DELAYED RELEASE ORAL at 06:10

## 2018-03-04 RX ADMIN — ATORVASTATIN CALCIUM 10 MILLIGRAM(S): 80 TABLET, FILM COATED ORAL at 21:56

## 2018-03-04 RX ADMIN — Medication 40 MILLIEQUIVALENT(S): at 11:37

## 2018-03-04 RX ADMIN — Medication 0.5 MILLIGRAM(S): at 06:10

## 2018-03-04 RX ADMIN — PIPERACILLIN AND TAZOBACTAM 25 GRAM(S): 4; .5 INJECTION, POWDER, LYOPHILIZED, FOR SOLUTION INTRAVENOUS at 13:56

## 2018-03-04 RX ADMIN — HEPARIN SODIUM 5000 UNIT(S): 5000 INJECTION INTRAVENOUS; SUBCUTANEOUS at 17:42

## 2018-03-04 RX ADMIN — BUPROPION HYDROCHLORIDE 150 MILLIGRAM(S): 150 TABLET, EXTENDED RELEASE ORAL at 08:29

## 2018-03-04 RX ADMIN — PIPERACILLIN AND TAZOBACTAM 25 GRAM(S): 4; .5 INJECTION, POWDER, LYOPHILIZED, FOR SOLUTION INTRAVENOUS at 21:56

## 2018-03-04 RX ADMIN — TAMSULOSIN HYDROCHLORIDE 0.4 MILLIGRAM(S): 0.4 CAPSULE ORAL at 21:56

## 2018-03-04 RX ADMIN — PANTOPRAZOLE SODIUM 40 MILLIGRAM(S): 20 TABLET, DELAYED RELEASE ORAL at 17:41

## 2018-03-04 RX ADMIN — Medication 0.5 MILLIGRAM(S): at 21:56

## 2018-03-04 RX ADMIN — BUPROPION HYDROCHLORIDE 150 MILLIGRAM(S): 150 TABLET, EXTENDED RELEASE ORAL at 17:41

## 2018-03-04 RX ADMIN — PIPERACILLIN AND TAZOBACTAM 25 GRAM(S): 4; .5 INJECTION, POWDER, LYOPHILIZED, FOR SOLUTION INTRAVENOUS at 06:11

## 2018-03-04 NOTE — PROGRESS NOTE ADULT - PROBLEM SELECTOR PLAN 1
had some treatment as outpt - pt could not elaborate on that on interview  c/w zosyn for now for presumed diverticulitis, as per discussion with GI, Dr. Leary, plan for colonoscopy as outpt in 2-3 weeks.  consider de-escalating to Augmentin after EGD tomorrow  BCx negative  f/up GI recs  starting to have some loose stool; monitor closely -- if becomes watery diarrhea may need to r/out c diff.

## 2018-03-04 NOTE — PROGRESS NOTE ADULT - SUBJECTIVE AND OBJECTIVE BOX
Interval Events: no acute overnight events. Constipation resolved. + bm x 3. tolerating diet. denies abd pain     HPI: 87 yo white male recently Dx and treated for diverticulitis x 2 courses but who now presents c/o few days of nausea, slight LLQ abdominal pain and generalized weakness and fatigue. Admit to clear liquid and soft low calorie diet since Dx and lost 14 pounds. No fever, chills, vomiting, diarrhea, cough, dysuria or hematuria. Last colonoscopy 5 years ago reportedly negative.     CT a/p with Ulceration of the duodenal bulb with extensive wall thickening and   surrounding inflammatory change suspicious for early/contained   perforation. No extraluminal air or ascites.  2.8 cm asymmetric wall thickening in the sigmoid colon suspicious for   neoplasm. Follow-up colonoscopy is advised if not recently performed.    MEDICATIONS  (STANDING):  ALPRAZolam 0.5 milliGRAM(s) Oral three times a day  atorvastatin 10 milliGRAM(s) Oral at bedtime  buPROPion SR (12-Hour) Oral Tab/Cap - Peds 150 milliGRAM(s) Oral two times a day  heparin  Injectable 5000 Unit(s) SubCutaneous every 12 hours  pantoprazole  Injectable 40 milliGRAM(s) IV Push every 12 hours  piperacillin/tazobactam IVPB. 3.375 Gram(s) IV Intermittent every 8 hours  sodium chloride 0.9%. 1000 milliLiter(s) (50 mL/Hr) IV Continuous <Continuous>  tamsulosin 0.4 milliGRAM(s) Oral at bedtime    MEDICATIONS  (PRN):      Allergies    No Known Allergies    Intolerances        Review of Systems:    General:  No wt loss, fevers, chills, night sweats,fatigue,   Eyes:  Good vision, no reported pain  ENT:  No sore throat, pain, runny nose, dysphagia  CV:  No pain, palpitations, hypo/hypertension  Resp:  No dyspnea, cough, tachypnea, wheezing  GI:  No pain, No nausea, No vomiting, No diarrhea, No constipation, No weight loss, No fever, No pruritis, No rectal bleeding, No melena, No dysphagia  :  No pain, bleeding, incontinence, nocturia  Muscle:  No pain, weakness  Neuro:  No weakness, tingling, memory problems  Psych:  No fatigue, insomnia, mood problems, depression  Endocrine:  No polyuria, polydypsia, cold/heat intolerance  Heme:  No petechiae, ecchymosis, easy bruisability  Skin:  No rash, tattoos, scars, edema      Vital Signs Last 24 Hrs  T(C): 36.4 (04 Mar 2018 05:15), Max: 36.6 (03 Mar 2018 13:51)  T(F): 97.5 (04 Mar 2018 05:15), Max: 97.8 (03 Mar 2018 13:51)  HR: 79 (04 Mar 2018 05:15) (75 - 84)  BP: 148/98 (04 Mar 2018 06:23) (138/94 - 158/86)  BP(mean): --  RR: 18 (04 Mar 2018 05:15) (18 - 18)  SpO2: 99% (04 Mar 2018 05:15) (97% - 99%)      PHYSICAL EXAM:    Constitutional: NAD, well-developed  HEENT: EOMI, throat clear  Neck: No LAD, supple  Respiratory: CTA and P  Cardiovascular: S1 and S2, RRR, no M  Gastrointestinal: BS+, soft, NT/ND, neg HSM,  Extremities: No peripheral edema, neg clubing, cyanosis  Vascular: 2+ peripheral pulses  Neurological: A/O x 3, no focal deficits  Psychiatric: Normal mood, normal affect  Skin: No rashes      LABS:                                                                 13.3   5.6   )-----------( 191      ( 04 Mar 2018 08:15 )             37.8   03-04    144  |  108  |  11  ----------------------------<  81  3.2<L>   |  28  |  1.40<H>    Ca    8.6      04 Mar 2018 08:15  Mg     2.0     03-03                RADIOLOGY & ADDITIONAL TESTS:

## 2018-03-04 NOTE — PROGRESS NOTE ADULT - PROBLEM SELECTOR PLAN 1
unclear if sigmoid thickening is a neoplasm versus diverticulitis   treat for diverticulitis prior to colonoscopy  IVF, IVAB  diet low residue

## 2018-03-04 NOTE — PROGRESS NOTE ADULT - SUBJECTIVE AND OBJECTIVE BOX
Patient is a 88y old  Male who presents with a chief complaint of LLQ abdominal pain and fatigue (01 Mar 2018 16:47)      INTERVAL HPI/OVERNIGHT EVENTS: Pt denies nausea, vomiting, constipation. Reports having 2-3 loose BMs in last 24 hours. Pt denies fever, chills, abd pain, dizziness.     MEDICATIONS  (STANDING):  ALPRAZolam 0.5 milliGRAM(s) Oral three times a day  atorvastatin 10 milliGRAM(s) Oral at bedtime  buPROPion SR (12-Hour) Oral Tab/Cap - Peds 150 milliGRAM(s) Oral two times a day  heparin  Injectable 5000 Unit(s) SubCutaneous every 12 hours  pantoprazole  Injectable 40 milliGRAM(s) IV Push every 12 hours  piperacillin/tazobactam IVPB. 3.375 Gram(s) IV Intermittent every 8 hours  potassium chloride   Powder 40 milliEquivalent(s) Oral every 6 hours  tamsulosin 0.4 milliGRAM(s) Oral at bedtime    MEDICATIONS  (PRN):      Allergies    No Known Allergies    Intolerances        REVIEW OF SYSTEMS:  CONSTITUTIONAL: No fever or chills  HEENT:  No headache, no sore throat  RESPIRATORY: No cough, wheezing, or shortness of breath  CARDIOVASCULAR: No chest pain, palpitations, or leg swelling  GASTROINTESTINAL: +2-3 loose BMs in last 24 hours; No abd pain, nausea, vomiting, hematemesis, melena, hematochezia, or diarrhea  GENITOURINARY: No dysuria, frequency, or hematuria  NEUROLOGICAL: no focal weakness or dizziness  MUSCULOSKELETAL: no myalgias     Vital Signs Last 24 Hrs  T(C): 36.8 (04 Mar 2018 13:33), Max: 36.8 (04 Mar 2018 13:33)  T(F): 98.2 (04 Mar 2018 13:33), Max: 98.2 (04 Mar 2018 13:33)  HR: 88 (04 Mar 2018 13:33) (75 - 88)  BP: 151/96 (04 Mar 2018 13:33) (138/94 - 151/96)  BP(mean): --  RR: 17 (04 Mar 2018 13:33) (17 - 18)  SpO2: 98% (04 Mar 2018 13:33) (97% - 99%)    PHYSICAL EXAM:  GENERAL: NAD  HEENT:  EOMI, moist mucous membranes  CHEST/LUNG:  CTA b/l, no rales, wheezes, or rhonchi  HEART:  RRR, S1, S2  ABDOMEN:  BS+, soft, + very mild tenderness to palpation in LLQ without guarding, nondistended  EXTREMITIES: no edema, cyanosis, or calf tenderness  NERVOUS SYSTEM: answers questions and follows commands appropriately    LABS:                        13.3   5.6   )-----------( 191      ( 04 Mar 2018 08:15 )             37.8     CBC Full  -  ( 04 Mar 2018 08:15 )  WBC Count : 5.6 K/uL  Hemoglobin : 13.3 g/dL  Hematocrit : 37.8 %  Platelet Count - Automated : 191 K/uL  Mean Cell Volume : 89.5 fl  Mean Cell Hemoglobin : 31.5 pg  Mean Cell Hemoglobin Concentration : 35.2 gm/dL  Auto Neutrophil # : x  Auto Lymphocyte # : x  Auto Monocyte # : x  Auto Eosinophil # : x  Auto Basophil # : x  Auto Neutrophil % : x  Auto Lymphocyte % : x  Auto Monocyte % : x  Auto Eosinophil % : x  Auto Basophil % : x    04 Mar 2018 08:15    144    |  108    |  11     ----------------------------<  81     3.2     |  28     |  1.40     Ca    8.6        04 Mar 2018 08:15          CAPILLARY BLOOD GLUCOSE            Culture - Blood (collected 03-01-18 @ 21:33)  Source: .Blood Blood-Peripheral  Preliminary Report (03-02-18 @ 22:01):    No growth to date.    Culture - Blood (collected 03-01-18 @ 21:33)  Source: .Blood Blood-Peripheral  Preliminary Report (03-02-18 @ 22:01):    No growth to date.        RADIOLOGY & ADDITIONAL TESTS:    Personally reviewed.     Consultant(s) Notes Reviewed:  [x] YES  [ ] NO

## 2018-03-04 NOTE — PROGRESS NOTE ADULT - SUBJECTIVE AND OBJECTIVE BOX
pt seen  diarrhea overnight, dark stool  no N/V  no abdominal pain  ICU Vital Signs Last 24 Hrs  T(C): 36.8 (04 Mar 2018 13:33), Max: 36.8 (04 Mar 2018 13:33)  T(F): 98.2 (04 Mar 2018 13:33), Max: 98.2 (04 Mar 2018 13:33)  HR: 88 (04 Mar 2018 13:33) (75 - 88)  BP: 151/96 (04 Mar 2018 13:33) (138/94 - 151/96)  BP(mean): --  ABP: --  ABP(mean): --  RR: 17 (04 Mar 2018 13:33) (17 - 18)  SpO2: 98% (04 Mar 2018 13:33) (97% - 99%)  gen- NAD  resp-clear b/l  CVS- reg rate rhythm  abd-soft NT/ND                            13.3   5.6   )-----------( 191      ( 04 Mar 2018 08:15 )             37.8   03-04    144  |  108  |  11  ----------------------------<  81  3.2<L>   |  28  |  1.40<H>    Ca    8.6      04 Mar 2018 08:15  Mg     2.0     03-03

## 2018-03-05 ENCOUNTER — TRANSCRIPTION ENCOUNTER (OUTPATIENT)
Age: 83
End: 2018-03-05

## 2018-03-05 ENCOUNTER — RESULT REVIEW (OUTPATIENT)
Age: 83
End: 2018-03-05

## 2018-03-05 LAB
ANION GAP SERPL CALC-SCNC: 9 MMOL/L — SIGNIFICANT CHANGE UP (ref 5–17)
BUN SERPL-MCNC: 14 MG/DL — SIGNIFICANT CHANGE UP (ref 7–23)
CALCIUM SERPL-MCNC: 8.2 MG/DL — LOW (ref 8.5–10.1)
CHLORIDE SERPL-SCNC: 110 MMOL/L — HIGH (ref 96–108)
CO2 SERPL-SCNC: 26 MMOL/L — SIGNIFICANT CHANGE UP (ref 22–31)
CREAT SERPL-MCNC: 1.4 MG/DL — HIGH (ref 0.5–1.3)
GLUCOSE SERPL-MCNC: 95 MG/DL — SIGNIFICANT CHANGE UP (ref 70–99)
HCT VFR BLD CALC: 37.8 % — LOW (ref 39–50)
HGB BLD-MCNC: 12.9 G/DL — LOW (ref 13–17)
MAGNESIUM SERPL-MCNC: 1.9 MG/DL — SIGNIFICANT CHANGE UP (ref 1.6–2.6)
MCHC RBC-ENTMCNC: 30.3 PG — SIGNIFICANT CHANGE UP (ref 27–34)
MCHC RBC-ENTMCNC: 34.1 GM/DL — SIGNIFICANT CHANGE UP (ref 32–36)
MCV RBC AUTO: 89.1 FL — SIGNIFICANT CHANGE UP (ref 80–100)
PLATELET # BLD AUTO: 203 K/UL — SIGNIFICANT CHANGE UP (ref 150–400)
POTASSIUM SERPL-MCNC: 3.3 MMOL/L — LOW (ref 3.5–5.3)
POTASSIUM SERPL-SCNC: 3.3 MMOL/L — LOW (ref 3.5–5.3)
RBC # BLD: 4.25 M/UL — SIGNIFICANT CHANGE UP (ref 4.2–5.8)
RBC # FLD: 12.5 % — SIGNIFICANT CHANGE UP (ref 10.3–14.5)
SODIUM SERPL-SCNC: 145 MMOL/L — SIGNIFICANT CHANGE UP (ref 135–145)
WBC # BLD: 6.5 K/UL — SIGNIFICANT CHANGE UP (ref 3.8–10.5)
WBC # FLD AUTO: 6.5 K/UL — SIGNIFICANT CHANGE UP (ref 3.8–10.5)

## 2018-03-05 PROCEDURE — 88313 SPECIAL STAINS GROUP 2: CPT | Mod: 26

## 2018-03-05 PROCEDURE — 88312 SPECIAL STAINS GROUP 1: CPT | Mod: 26

## 2018-03-05 PROCEDURE — 88305 TISSUE EXAM BY PATHOLOGIST: CPT | Mod: 26

## 2018-03-05 PROCEDURE — 99233 SBSQ HOSP IP/OBS HIGH 50: CPT

## 2018-03-05 PROCEDURE — 74018 RADEX ABDOMEN 1 VIEW: CPT | Mod: 26

## 2018-03-05 RX ORDER — PANTOPRAZOLE SODIUM 20 MG/1
40 TABLET, DELAYED RELEASE ORAL
Qty: 0 | Refills: 0 | Status: DISCONTINUED | OUTPATIENT
Start: 2018-03-05 | End: 2018-03-06

## 2018-03-05 RX ORDER — SIMETHICONE 80 MG/1
80 TABLET, CHEWABLE ORAL
Qty: 0 | Refills: 0 | Status: DISCONTINUED | OUTPATIENT
Start: 2018-03-05 | End: 2018-03-06

## 2018-03-05 RX ORDER — POTASSIUM CHLORIDE 20 MEQ
40 PACKET (EA) ORAL ONCE
Qty: 0 | Refills: 0 | Status: COMPLETED | OUTPATIENT
Start: 2018-03-05 | End: 2018-03-05

## 2018-03-05 RX ORDER — SUCRALFATE 1 G
1 TABLET ORAL
Qty: 0 | Refills: 0 | Status: DISCONTINUED | OUTPATIENT
Start: 2018-03-05 | End: 2018-03-06

## 2018-03-05 RX ORDER — SIMETHICONE 80 MG/1
80 TABLET, CHEWABLE ORAL ONCE
Qty: 0 | Refills: 0 | Status: COMPLETED | OUTPATIENT
Start: 2018-03-05 | End: 2018-03-05

## 2018-03-05 RX ORDER — MORPHINE SULFATE 50 MG/1
4 CAPSULE, EXTENDED RELEASE ORAL ONCE
Qty: 0 | Refills: 0 | Status: DISCONTINUED | OUTPATIENT
Start: 2018-03-05 | End: 2018-03-05

## 2018-03-05 RX ORDER — ACETAMINOPHEN 500 MG
650 TABLET ORAL EVERY 6 HOURS
Qty: 0 | Refills: 0 | Status: DISCONTINUED | OUTPATIENT
Start: 2018-03-05 | End: 2018-03-06

## 2018-03-05 RX ORDER — ONDANSETRON 8 MG/1
4 TABLET, FILM COATED ORAL ONCE
Qty: 0 | Refills: 0 | Status: COMPLETED | OUTPATIENT
Start: 2018-03-05 | End: 2018-03-05

## 2018-03-05 RX ADMIN — SIMETHICONE 80 MILLIGRAM(S): 80 TABLET, CHEWABLE ORAL at 15:50

## 2018-03-05 RX ADMIN — SODIUM CHLORIDE 50 MILLILITER(S): 9 INJECTION, SOLUTION INTRAVENOUS at 01:44

## 2018-03-05 RX ADMIN — PANTOPRAZOLE SODIUM 40 MILLIGRAM(S): 20 TABLET, DELAYED RELEASE ORAL at 06:17

## 2018-03-05 RX ADMIN — BUPROPION HYDROCHLORIDE 150 MILLIGRAM(S): 150 TABLET, EXTENDED RELEASE ORAL at 06:17

## 2018-03-05 RX ADMIN — MORPHINE SULFATE 4 MILLIGRAM(S): 50 CAPSULE, EXTENDED RELEASE ORAL at 19:07

## 2018-03-05 RX ADMIN — MORPHINE SULFATE 4 MILLIGRAM(S): 50 CAPSULE, EXTENDED RELEASE ORAL at 18:34

## 2018-03-05 RX ADMIN — PIPERACILLIN AND TAZOBACTAM 25 GRAM(S): 4; .5 INJECTION, POWDER, LYOPHILIZED, FOR SOLUTION INTRAVENOUS at 06:17

## 2018-03-05 RX ADMIN — PANTOPRAZOLE SODIUM 40 MILLIGRAM(S): 20 TABLET, DELAYED RELEASE ORAL at 18:03

## 2018-03-05 RX ADMIN — Medication 0.5 MILLIGRAM(S): at 13:30

## 2018-03-05 RX ADMIN — Medication 0.5 MILLIGRAM(S): at 06:17

## 2018-03-05 RX ADMIN — HEPARIN SODIUM 5000 UNIT(S): 5000 INJECTION INTRAVENOUS; SUBCUTANEOUS at 18:03

## 2018-03-05 RX ADMIN — BUPROPION HYDROCHLORIDE 150 MILLIGRAM(S): 150 TABLET, EXTENDED RELEASE ORAL at 18:03

## 2018-03-05 RX ADMIN — Medication 40 MILLIEQUIVALENT(S): at 10:45

## 2018-03-05 RX ADMIN — Medication 0.5 MILLIGRAM(S): at 22:07

## 2018-03-05 RX ADMIN — SIMETHICONE 80 MILLIGRAM(S): 80 TABLET, CHEWABLE ORAL at 22:07

## 2018-03-05 RX ADMIN — PIPERACILLIN AND TAZOBACTAM 25 GRAM(S): 4; .5 INJECTION, POWDER, LYOPHILIZED, FOR SOLUTION INTRAVENOUS at 22:08

## 2018-03-05 RX ADMIN — Medication 30 MILLILITER(S): at 15:49

## 2018-03-05 RX ADMIN — TAMSULOSIN HYDROCHLORIDE 0.4 MILLIGRAM(S): 0.4 CAPSULE ORAL at 22:07

## 2018-03-05 RX ADMIN — ATORVASTATIN CALCIUM 10 MILLIGRAM(S): 80 TABLET, FILM COATED ORAL at 22:07

## 2018-03-05 RX ADMIN — PIPERACILLIN AND TAZOBACTAM 25 GRAM(S): 4; .5 INJECTION, POWDER, LYOPHILIZED, FOR SOLUTION INTRAVENOUS at 13:30

## 2018-03-05 NOTE — PROGRESS NOTE ADULT - PROBLEM SELECTOR PLAN 1
had some treatment as outpt - pt could not elaborate on that on interview  c/w zosyn for now for presumed diverticulitis, as per discussion with GI, plan for colonoscopy as outpt in 4-6 weeks.  will de-escalate to Augmentin on discharge (likely tomorrow)  BCx negative  f/up GI recs  loose stools improved -- pt reports no diarrhea

## 2018-03-05 NOTE — PROGRESS NOTE ADULT - PROBLEM SELECTOR PLAN 8
DVT ppx: HSQ  monitor H&H -- stable since yesterday, no sign of bleeding
DVT ppx: HSQ  monitor H&H -- stable for several days, no sign of bleeding

## 2018-03-05 NOTE — PROGRESS NOTE ADULT - SUBJECTIVE AND OBJECTIVE BOX
pt seen  no complaints  NPO  ICU Vital Signs Last 24 Hrs  T(C): 36.8 (05 Mar 2018 04:33), Max: 36.8 (04 Mar 2018 13:33)  T(F): 98.3 (05 Mar 2018 04:33), Max: 98.3 (05 Mar 2018 04:33)  HR: 76 (05 Mar 2018 04:33) (76 - 91)  BP: 154/74 (05 Mar 2018 04:33) (150/76 - 160/100)  BP(mean): --  ABP: --  ABP(mean): --  RR: 17 (05 Mar 2018 04:33) (17 - 18)  SpO2: 98% (05 Mar 2018 04:33) (98% - 98%)  gen-NAD  resp-clear b/l  CVS=reg rate and rhythm  abd-soft NT, mild distension?                            12.9   6.5   )-----------( 203      ( 05 Mar 2018 07:08 )             37.8   03-05    145  |  110<H>  |  14  ----------------------------<  95  3.3<L>   |  26  |  1.40<H>    Ca    8.2<L>      05 Mar 2018 07:08  Mg     1.9     03-05

## 2018-03-05 NOTE — PROGRESS NOTE ADULT - ATTENDING COMMENTS
Note written by attending, see above.

## 2018-03-05 NOTE — PROGRESS NOTE ADULT - SUBJECTIVE AND OBJECTIVE BOX
Patient is a 88y old  Male who presents with a chief complaint of LLQ abdominal pain and fatigue (01 Mar 2018 16:47)      INTERVAL HPI/OVERNIGHT EVENTS:    MEDICATIONS  (STANDING):  ALPRAZolam 0.5 milliGRAM(s) Oral three times a day  atorvastatin 10 milliGRAM(s) Oral at bedtime  buPROPion SR (12-Hour) Oral Tab/Cap - Peds 150 milliGRAM(s) Oral two times a day  heparin  Injectable 5000 Unit(s) SubCutaneous every 12 hours  pantoprazole    Tablet 40 milliGRAM(s) Oral two times a day before meals  piperacillin/tazobactam IVPB. 3.375 Gram(s) IV Intermittent every 8 hours  simethicone 80 milliGRAM(s) Chew two times a day  sucralfate 1 Gram(s) Oral three times a day before meals  tamsulosin 0.4 milliGRAM(s) Oral at bedtime    MEDICATIONS  (PRN):  acetaminophen   Tablet. 650 milliGRAM(s) Oral every 6 hours PRN pain      Allergies    No Known Allergies    Intolerances        REVIEW OF SYSTEMS:  CONSTITUTIONAL: No fever or chills  HEENT:  No headache, no sore throat  RESPIRATORY: No cough, wheezing, or shortness of breath  CARDIOVASCULAR: No chest pain, palpitations, or leg swelling  GASTROINTESTINAL: No abd pain, nausea, vomiting, hematemesis, melena, hematochezia, or diarrhea  GENITOURINARY: No dysuria, frequency, or hematuria  NEUROLOGICAL: no focal weakness or dizziness  MUSCULOSKELETAL: no myalgias      Vital Signs Last 24 Hrs  T(C): 36.8 (05 Mar 2018 04:33), Max: 36.8 (04 Mar 2018 13:33)  T(F): 98.3 (05 Mar 2018 04:33), Max: 98.3 (05 Mar 2018 04:33)  HR: 76 (05 Mar 2018 04:33) (76 - 91)  BP: 154/74 (05 Mar 2018 04:33) (150/76 - 160/100)  BP(mean): --  RR: 17 (05 Mar 2018 04:33) (17 - 18)  SpO2: 98% (05 Mar 2018 04:33) (98% - 98%)    PHYSICAL EXAM:  GENERAL: mildly anxious prior to endoscopy  HEENT:  EOMI, moist mucous membranes  CHEST/LUNG:  CTA b/l, no rales, wheezes, or rhonchi  HEART:  RRR, S1, S2  ABDOMEN:  BS+, soft, nontender, nondistended  EXTREMITIES: no edema, cyanosis, or calf tenderness  NERVOUS SYSTEM: answers questions and follows commands appropriately    LABS:                        12.9   6.5   )-----------( 203      ( 05 Mar 2018 07:08 )             37.8     CBC Full  -  ( 05 Mar 2018 07:08 )  WBC Count : 6.5 K/uL  Hemoglobin : 12.9 g/dL  Hematocrit : 37.8 %  Platelet Count - Automated : 203 K/uL  Mean Cell Volume : 89.1 fl  Mean Cell Hemoglobin : 30.3 pg  Mean Cell Hemoglobin Concentration : 34.1 gm/dL  Auto Neutrophil # : x  Auto Lymphocyte # : x  Auto Monocyte # : x  Auto Eosinophil # : x  Auto Basophil # : x  Auto Neutrophil % : x  Auto Lymphocyte % : x  Auto Monocyte % : x  Auto Eosinophil % : x  Auto Basophil % : x    05 Mar 2018 07:08    145    |  110    |  14     ----------------------------<  95     3.3     |  26     |  1.40     Ca    8.2        05 Mar 2018 07:08  Mg     1.9       05 Mar 2018 07:08          CAPILLARY BLOOD GLUCOSE            Culture - Blood (collected 03-01-18 @ 21:33)  Source: .Blood Blood-Peripheral  Preliminary Report (03-02-18 @ 22:01):    No growth to date.    Culture - Blood (collected 03-01-18 @ 21:33)  Source: .Blood Blood-Peripheral  Preliminary Report (03-02-18 @ 22:01):    No growth to date.        RADIOLOGY & ADDITIONAL TESTS:    Personally reviewed.     Consultant(s) Notes Reviewed:  [x] YES  [ ] NO Patient is a 88y old  Male who presents with a chief complaint of LLQ abdominal pain and fatigue (01 Mar 2018 16:47)    INTERVAL HPI/OVERNIGHT EVENTS: Pt states he is somewhat anxious about endoscopy. Denies melena, hematochezia, abd pain, fever, chills.     MEDICATIONS  (STANDING):  ALPRAZolam 0.5 milliGRAM(s) Oral three times a day  atorvastatin 10 milliGRAM(s) Oral at bedtime  buPROPion SR (12-Hour) Oral Tab/Cap - Peds 150 milliGRAM(s) Oral two times a day  heparin  Injectable 5000 Unit(s) SubCutaneous every 12 hours  pantoprazole    Tablet 40 milliGRAM(s) Oral two times a day before meals  piperacillin/tazobactam IVPB. 3.375 Gram(s) IV Intermittent every 8 hours  simethicone 80 milliGRAM(s) Chew two times a day  sucralfate 1 Gram(s) Oral three times a day before meals  tamsulosin 0.4 milliGRAM(s) Oral at bedtime    MEDICATIONS  (PRN):  acetaminophen   Tablet. 650 milliGRAM(s) Oral every 6 hours PRN pain      Allergies    No Known Allergies    Intolerances        REVIEW OF SYSTEMS:  CONSTITUTIONAL: No fever or chills  HEENT:  No headache, no sore throat  RESPIRATORY: No cough, wheezing, or shortness of breath  CARDIOVASCULAR: No chest pain, palpitations, or leg swelling  GASTROINTESTINAL: No abd pain, nausea, vomiting, hematemesis, melena, hematochezia, or diarrhea  GENITOURINARY: No dysuria, frequency, or hematuria  NEUROLOGICAL: no focal weakness or dizziness  MUSCULOSKELETAL: no myalgias    PSYCH: +anxiety pre-procedure    Vital Signs Last 24 Hrs  T(C): 36.8 (05 Mar 2018 04:33), Max: 36.8 (04 Mar 2018 13:33)  T(F): 98.3 (05 Mar 2018 04:33), Max: 98.3 (05 Mar 2018 04:33)  HR: 76 (05 Mar 2018 04:33) (76 - 91)  BP: 154/74 (05 Mar 2018 04:33) (150/76 - 160/100)  BP(mean): --  RR: 17 (05 Mar 2018 04:33) (17 - 18)  SpO2: 98% (05 Mar 2018 04:33) (98% - 98%)    PHYSICAL EXAM:  GENERAL: mildly anxious prior to endoscopy  HEENT:  EOMI, moist mucous membranes  CHEST/LUNG:  CTA b/l, no rales, wheezes, or rhonchi  HEART:  RRR, S1, S2  ABDOMEN:  BS+, soft, nontender, nondistended  EXTREMITIES: no edema, cyanosis, or calf tenderness  NERVOUS SYSTEM: answers questions and follows commands appropriately    LABS:                        12.9   6.5   )-----------( 203      ( 05 Mar 2018 07:08 )             37.8     CBC Full  -  ( 05 Mar 2018 07:08 )  WBC Count : 6.5 K/uL  Hemoglobin : 12.9 g/dL  Hematocrit : 37.8 %  Platelet Count - Automated : 203 K/uL  Mean Cell Volume : 89.1 fl  Mean Cell Hemoglobin : 30.3 pg  Mean Cell Hemoglobin Concentration : 34.1 gm/dL  Auto Neutrophil # : x  Auto Lymphocyte # : x  Auto Monocyte # : x  Auto Eosinophil # : x  Auto Basophil # : x  Auto Neutrophil % : x  Auto Lymphocyte % : x  Auto Monocyte % : x  Auto Eosinophil % : x  Auto Basophil % : x    05 Mar 2018 07:08    145    |  110    |  14     ----------------------------<  95     3.3     |  26     |  1.40     Ca    8.2        05 Mar 2018 07:08  Mg     1.9       05 Mar 2018 07:08          CAPILLARY BLOOD GLUCOSE            Culture - Blood (collected 03-01-18 @ 21:33)  Source: .Blood Blood-Peripheral  Preliminary Report (03-02-18 @ 22:01):    No growth to date.    Culture - Blood (collected 03-01-18 @ 21:33)  Source: .Blood Blood-Peripheral  Preliminary Report (03-02-18 @ 22:01):    No growth to date.        RADIOLOGY & ADDITIONAL TESTS:    Personally reviewed.     Consultant(s) Notes Reviewed:  [x] YES  [ ] NO

## 2018-03-06 ENCOUNTER — TRANSCRIPTION ENCOUNTER (OUTPATIENT)
Age: 83
End: 2018-03-06

## 2018-03-06 VITALS
DIASTOLIC BLOOD PRESSURE: 91 MMHG | RESPIRATION RATE: 16 BRPM | TEMPERATURE: 98 F | SYSTOLIC BLOOD PRESSURE: 151 MMHG | OXYGEN SATURATION: 97 % | HEART RATE: 95 BPM

## 2018-03-06 LAB
ANION GAP SERPL CALC-SCNC: 10 MMOL/L — SIGNIFICANT CHANGE UP (ref 5–17)
BUN SERPL-MCNC: 12 MG/DL — SIGNIFICANT CHANGE UP (ref 7–23)
CALCIUM SERPL-MCNC: 8.7 MG/DL — SIGNIFICANT CHANGE UP (ref 8.5–10.1)
CHLORIDE SERPL-SCNC: 109 MMOL/L — HIGH (ref 96–108)
CO2 SERPL-SCNC: 25 MMOL/L — SIGNIFICANT CHANGE UP (ref 22–31)
CREAT SERPL-MCNC: 1.3 MG/DL — SIGNIFICANT CHANGE UP (ref 0.5–1.3)
CULTURE RESULTS: SIGNIFICANT CHANGE UP
CULTURE RESULTS: SIGNIFICANT CHANGE UP
GLUCOSE SERPL-MCNC: 92 MG/DL — SIGNIFICANT CHANGE UP (ref 70–99)
HCT VFR BLD CALC: 39.4 % — SIGNIFICANT CHANGE UP (ref 39–50)
HGB BLD-MCNC: 13.8 G/DL — SIGNIFICANT CHANGE UP (ref 13–17)
MCHC RBC-ENTMCNC: 31.1 PG — SIGNIFICANT CHANGE UP (ref 27–34)
MCHC RBC-ENTMCNC: 35 GM/DL — SIGNIFICANT CHANGE UP (ref 32–36)
MCV RBC AUTO: 89 FL — SIGNIFICANT CHANGE UP (ref 80–100)
PLATELET # BLD AUTO: 197 K/UL — SIGNIFICANT CHANGE UP (ref 150–400)
POTASSIUM SERPL-MCNC: 3.7 MMOL/L — SIGNIFICANT CHANGE UP (ref 3.5–5.3)
POTASSIUM SERPL-SCNC: 3.7 MMOL/L — SIGNIFICANT CHANGE UP (ref 3.5–5.3)
RBC # BLD: 4.42 M/UL — SIGNIFICANT CHANGE UP (ref 4.2–5.8)
RBC # FLD: 12.5 % — SIGNIFICANT CHANGE UP (ref 10.3–14.5)
SODIUM SERPL-SCNC: 144 MMOL/L — SIGNIFICANT CHANGE UP (ref 135–145)
SPECIMEN SOURCE: SIGNIFICANT CHANGE UP
SPECIMEN SOURCE: SIGNIFICANT CHANGE UP
WBC # BLD: 7.2 K/UL — SIGNIFICANT CHANGE UP (ref 3.8–10.5)
WBC # FLD AUTO: 7.2 K/UL — SIGNIFICANT CHANGE UP (ref 3.8–10.5)

## 2018-03-06 PROCEDURE — 83735 ASSAY OF MAGNESIUM: CPT

## 2018-03-06 PROCEDURE — 88305 TISSUE EXAM BY PATHOLOGIST: CPT

## 2018-03-06 PROCEDURE — 82150 ASSAY OF AMYLASE: CPT

## 2018-03-06 PROCEDURE — 96372 THER/PROPH/DIAG INJ SC/IM: CPT | Mod: 59

## 2018-03-06 PROCEDURE — 96376 TX/PRO/DX INJ SAME DRUG ADON: CPT

## 2018-03-06 PROCEDURE — 80053 COMPREHEN METABOLIC PANEL: CPT

## 2018-03-06 PROCEDURE — 96374 THER/PROPH/DIAG INJ IV PUSH: CPT

## 2018-03-06 PROCEDURE — 96375 TX/PRO/DX INJ NEW DRUG ADDON: CPT

## 2018-03-06 PROCEDURE — 99239 HOSP IP/OBS DSCHRG MGMT >30: CPT

## 2018-03-06 PROCEDURE — 84145 PROCALCITONIN (PCT): CPT

## 2018-03-06 PROCEDURE — 88313 SPECIAL STAINS GROUP 2: CPT

## 2018-03-06 PROCEDURE — 80048 BASIC METABOLIC PNL TOTAL CA: CPT

## 2018-03-06 PROCEDURE — 83690 ASSAY OF LIPASE: CPT

## 2018-03-06 PROCEDURE — 81001 URINALYSIS AUTO W/SCOPE: CPT

## 2018-03-06 PROCEDURE — 85027 COMPLETE CBC AUTOMATED: CPT

## 2018-03-06 PROCEDURE — 87040 BLOOD CULTURE FOR BACTERIA: CPT

## 2018-03-06 PROCEDURE — 74177 CT ABD & PELVIS W/CONTRAST: CPT

## 2018-03-06 PROCEDURE — 88312 SPECIAL STAINS GROUP 1: CPT

## 2018-03-06 PROCEDURE — 82378 CARCINOEMBRYONIC ANTIGEN: CPT

## 2018-03-06 PROCEDURE — 36415 COLL VENOUS BLD VENIPUNCTURE: CPT

## 2018-03-06 PROCEDURE — 99285 EMERGENCY DEPT VISIT HI MDM: CPT | Mod: 25

## 2018-03-06 PROCEDURE — 74018 RADEX ABDOMEN 1 VIEW: CPT

## 2018-03-06 RX ORDER — SUCRALFATE 1 G
1 TABLET ORAL
Qty: 90 | Refills: 0
Start: 2018-03-06 | End: 2018-04-04

## 2018-03-06 RX ORDER — PANTOPRAZOLE SODIUM 20 MG/1
1 TABLET, DELAYED RELEASE ORAL
Qty: 60 | Refills: 0 | OUTPATIENT
Start: 2018-03-06 | End: 2018-04-04

## 2018-03-06 RX ORDER — LACTOBACILLUS ACIDOPHILUS 100MM CELL
1 CAPSULE ORAL
Qty: 14 | Refills: 0 | OUTPATIENT
Start: 2018-03-06 | End: 2018-03-12

## 2018-03-06 RX ORDER — PANTOPRAZOLE SODIUM 20 MG/1
1 TABLET, DELAYED RELEASE ORAL
Qty: 0 | Refills: 0 | COMMUNITY
Start: 2018-03-06 | End: 2018-04-04

## 2018-03-06 RX ADMIN — Medication 1 GRAM(S): at 11:17

## 2018-03-06 RX ADMIN — Medication 1 GRAM(S): at 17:10

## 2018-03-06 RX ADMIN — Medication 0.5 MILLIGRAM(S): at 14:13

## 2018-03-06 RX ADMIN — BUPROPION HYDROCHLORIDE 150 MILLIGRAM(S): 150 TABLET, EXTENDED RELEASE ORAL at 06:15

## 2018-03-06 RX ADMIN — PIPERACILLIN AND TAZOBACTAM 25 GRAM(S): 4; .5 INJECTION, POWDER, LYOPHILIZED, FOR SOLUTION INTRAVENOUS at 14:13

## 2018-03-06 RX ADMIN — Medication 1 GRAM(S): at 06:15

## 2018-03-06 RX ADMIN — SIMETHICONE 80 MILLIGRAM(S): 80 TABLET, CHEWABLE ORAL at 17:10

## 2018-03-06 RX ADMIN — BUPROPION HYDROCHLORIDE 150 MILLIGRAM(S): 150 TABLET, EXTENDED RELEASE ORAL at 17:10

## 2018-03-06 RX ADMIN — SIMETHICONE 80 MILLIGRAM(S): 80 TABLET, CHEWABLE ORAL at 06:15

## 2018-03-06 RX ADMIN — Medication 0.5 MILLIGRAM(S): at 06:17

## 2018-03-06 RX ADMIN — PANTOPRAZOLE SODIUM 40 MILLIGRAM(S): 20 TABLET, DELAYED RELEASE ORAL at 17:10

## 2018-03-06 RX ADMIN — HEPARIN SODIUM 5000 UNIT(S): 5000 INJECTION INTRAVENOUS; SUBCUTANEOUS at 06:17

## 2018-03-06 RX ADMIN — PIPERACILLIN AND TAZOBACTAM 25 GRAM(S): 4; .5 INJECTION, POWDER, LYOPHILIZED, FOR SOLUTION INTRAVENOUS at 06:15

## 2018-03-06 RX ADMIN — PANTOPRAZOLE SODIUM 40 MILLIGRAM(S): 20 TABLET, DELAYED RELEASE ORAL at 06:15

## 2018-03-06 NOTE — DISCHARGE NOTE ADULT - MEDICATION SUMMARY - MEDICATIONS TO TAKE
I will START or STAY ON the medications listed below when I get home from the hospital:    terazosin 10 mg oral tablet  --  by mouth   -- Indication: For BPH (benign prostatic hyperplasia)    atorvastatin 10 mg oral tablet  -- 1 tab(s) by mouth once a day (at bedtime)  -- Indication: For HLD (hyperlipidemia)    Xanax 0.5 mg oral tablet  -- 1 tab(s) by mouth 3 times a day, As Needed for anxiety  -- Indication: For Anxiety    sucralfate 1 g oral tablet  -- 1 tab(s) by mouth 3 times a day (before meals)  -- Indication: For Duodenal ulcer      Augmentin 875 mg-125 mg oral tablet  -- 1 tab(s) by mouth every 12 hours for 4 more days starting tomorrow (3/7/18) morning  -- Finish all this medication unless otherwise directed by prescriber.  Take with food or milk.    -- Indication: For Diverticulitis    lactobacillus acidophilus oral capsule  -- 1 cap(s) by mouth 2 times a day (with meals)   -- Indication: For probiotic    pantoprazole 40 mg oral delayed release tablet  -- 1 tab(s) by mouth 2 times a day (before meals)  -- Indication: For Duodenal ulcer      buPROPion 150 mg/12 hours (SR) oral tablet, extended release  -- 1 tab(s) by mouth 2 times a day  -- Indication: For Anxiety

## 2018-03-06 NOTE — PROGRESS NOTE ADULT - SUBJECTIVE AND OBJECTIVE BOX
pt seen  no complaints  tolerating diet  ambulating  +F-bm  pain after endoscopy resolved  ICU Vital Signs Last 24 Hrs  T(C): 36.7 (06 Mar 2018 04:40), Max: 36.8 (05 Mar 2018 14:05)  T(F): 98.1 (06 Mar 2018 04:40), Max: 98.2 (05 Mar 2018 14:05)  HR: 95 (06 Mar 2018 04:40) (90 - 98)  BP: 166/90 (06 Mar 2018 04:40) (102/66 - 187/114)  BP(mean): --  ABP: --  ABP(mean): --  RR: 18 (06 Mar 2018 04:40) (14 - 21)  SpO2: 96% (06 Mar 2018 04:40) (96% - 98%)  gen-NAD  CTA b/l  RRR  soft NT/ND      87 yo with duodenal ulcer     cont GI meds     cleared for d/c from surgical standpoint

## 2018-03-06 NOTE — PROGRESS NOTE ADULT - SUBJECTIVE AND OBJECTIVE BOX
The patient was interviewed and evaluated.    88y Male    T(C): 36.7 (03-06-18 @ 04:40), Max: 36.8 (03-05-18 @ 14:05)  HR: 95 (03-06-18 @ 04:40) (90 - 98)  BP: 166/90 (03-06-18 @ 04:40) (102/66 - 187/114)  RR: 18 (03-06-18 @ 04:40) (14 - 21)  SpO2: 96% (03-06-18 @ 04:40) (96% - 98%)  Wt(kg): --    No Nausea/vomiting, recall, sore throat or headache.    No anesthesia related complaints or sequelae.    No additional recommendations.

## 2018-03-06 NOTE — PROGRESS NOTE ADULT - PROBLEM SELECTOR PLAN 3
colonoscopy with biopsy once diverticulitis has resolved
suspicious for neoplasm, but also has been getting treated for potential diverticulitis  CEA is normal  -- reportedly had normal colonoscopy >5 years ago  c/w zosyn for now for presumed diverticulitis, as per discussion with GI, Dr. Leary, plan for colonoscopy as outpt in 2-3 weeks.
suspicious for neoplasm, but also has been getting treated for potential diverticulitis  CEA is normal  -- reportedly had normal colonoscopy >5 years ago  c/w zosyn for now for presumed diverticulitis, as per discussion with GI, plan for colonoscopy as outpt in 4-6 weeks.
colonoscopy with biopsy once diverticulitis has resolved
suspicious for neoplasm, but also has been getting treated for potential diverticulitis  CEA is normal  -- reportedly had normal colonoscopy >5 years ago  c/w zosyn for now for presumed diverticulitis, as per discussion with GI, Dr. Leary today plan for colonoscopy as outpt in 2-3 weeks.

## 2018-03-06 NOTE — DISCHARGE NOTE ADULT - PLAN OF CARE
You have a large ulcer in your duodenum (the first part of the small intestine). Take the prescribed medication (carafate three times a day before meals and pantoprazole twice a day before meals) to help keep the ulcer stable. Follow up with gastroenterologist, Dr. Lainez (phone number below) in 3 weeks. You will need another endoscopy to assess for the healing of the ulcer in ~6 weeks. If you have black or bloody stool, or severe  persistent abdominal pain, call your doctor or come to the ER. You may have recurrence of your diverticulitis. Complete the prescribed antibiotics Augmentin course with 4 more days starting tomorrow morning. You may take the probiotic along with the antibiotic to help lessen gastrointestinal side effects. Follow up with gastroenterologist or PMD. You had some thickening in the sigmoid colon on CT scan. That may be related to diverticulitis. However, it is important to evaluate that with a colonoscopy. Gastroenterology recommends you follow up and have the colonoscopy along with the upper endoscopy in 4-6 weeks. Follow up with Dr. Lainez (phone number below). Continue your home terazosin and follow up with PMD. Continue your home lipitor and follow up with PMD. Continue your home regimen as needed. resolution

## 2018-03-06 NOTE — DISCHARGE NOTE ADULT - CARE PLAN
Principal Discharge DX:	Duodenal ulcer  Goal:	resolution  Assessment and plan of treatment:	You have a large ulcer in your duodenum (the first part of the small intestine). Take the prescribed medication (carafate three times a day before meals and pantoprazole twice a day before meals) to help keep the ulcer stable. Follow up with gastroenterologist, Dr. Lainez (phone number below) in 3 weeks. You will need another endoscopy to assess for the healing of the ulcer in ~6 weeks. If you have black or bloody stool, or severe  persistent abdominal pain, call your doctor or come to the ER.  Secondary Diagnosis:	Diverticulitis  Assessment and plan of treatment:	You may have recurrence of your diverticulitis. Complete the prescribed antibiotics Augmentin course with 4 more days starting tomorrow morning. You may take the probiotic along with the antibiotic to help lessen gastrointestinal side effects. Follow up with gastroenterologist or PMD.  Secondary Diagnosis:	Sigmoid thickening  Assessment and plan of treatment:	You had some thickening in the sigmoid colon on CT scan. That may be related to diverticulitis. However, it is important to evaluate that with a colonoscopy. Gastroenterology recommends you follow up and have the colonoscopy along with the upper endoscopy in 4-6 weeks. Follow up with Dr. Lainez (phone number below).  Secondary Diagnosis:	BPH (benign prostatic hyperplasia)  Assessment and plan of treatment:	Continue your home terazosin and follow up with PMD.  Secondary Diagnosis:	HLD (hyperlipidemia)  Assessment and plan of treatment:	Continue your home lipitor and follow up with PMD.  Secondary Diagnosis:	Anxiety  Assessment and plan of treatment:	Continue your home regimen as needed.

## 2018-03-06 NOTE — PROGRESS NOTE ADULT - ASSESSMENT
87 yo white male recently Dx and treated for diverticulitis x 2 courses but who now presents c/o few days of nausea, slight LLQ abdominal pain and generalized weakness. CT a/p with concern for neoplasm vs diverticulitis in sigmoid colon and duodenal bulb ulcer.
87yo M w/ PMH of anxiety, depression, HLD, BPH, recently diagnosed and treated for diverticulitis as outpatient, now presenting with few days of nausea, mild LLQ abdominal pain and generalized weakness and fatigue a/w possible recurrent diverticulitis and found to have duodenal bulb ulcer with possible locally contained perforation.
89yo M w/ PMH of anxiety, depression, HLD, BPH, recently diagnosed and treated for diverticulitis as outpatient, now presenting with few days of nausea, mild LLQ abdominal pain and generalized weakness and fatigue a/w possible recurrent diverticulitis and found to have duodenal bulb ulcer with possible locally contained perforation.
87 yo male with duodenal ulcer, and poss sigmoid lesion vs. diverticulitis.  Asymptomatic.       no acute surgical intervention indicated at this time     will need f/u with GI for outpt endoscopy, colonoscopy     short course abx
87 yo white male recently Dx and treated for diverticulitis x 2 courses but who now presents c/o few days of nausea, slight LLQ abdominal pain and generalized weakness. CT a/p with concern for neoplasm vs diverticulitis in sigmoid colon and duodenal bulb ulcer.
87 yo with ulcer disease/poss colon mass     for endoscopy tomorrow     will need colonoscopy when cleared by GI due to recent episode of diverticulitis
89 yo with duodenal ulcer, poss sigmoid mass     for endoscopy today     will follow
89yo M w/ PMH of anxiety, depression, HLD, BPH, recently diagnosed and treated for diverticulitis as outpatient, now presenting with few days of nausea, mild LLQ abdominal pain and generalized weakness and fatigue a/w possible recurrent diverticulitis and found to have duodenal bulb ulcer with possible locally contained perforation.

## 2018-03-06 NOTE — PROGRESS NOTE ADULT - PROBLEM SELECTOR PROBLEM 2
Duodenal ulcer with perforation but without obstruction

## 2018-03-06 NOTE — DISCHARGE NOTE ADULT - HOSPITAL COURSE
HPI: HPI:   87 yo white male with anxiety/depression/hld/bph  recently diagnosed  and treated for diverticulitis as outpt with  2 courses but who now presents c/o few days of nausea, slight LLQ abdominal pain and generalized weakness and fatigue, pt states has lost significant weight recently due to lack of appetite.   Pt stated last colonoscopy was about 8 years ago.     Hospital Course:  CT Abd/P showed ulceration of the duodenal bulb with extensive wall thickening and surrounding inflammatory change suspicious for early/contained perforation, and 2.8 cm asymmetric wall thickening in the sigmoid colon suspicious for neoplasm.  Pt was admitted with possible recurrent diverticulitis and duodenal bulb ulcer with possible locally contained perforation. Pt started on zosyn. GI symptoms improved. Abd pain resolved. Pt had EGD ([facility]) to eval the potential ulcer seen on CT and the EGD confirmed a large duodenal ulcer without active bleeding and no acute perforation. Pt was started on carafate and PPI BID and discharged to finish course of Abx for possible diverticulitis as outpt. Pt will follow up with GI as per their recs in about 6 weeks for repeat EGD and also for colonoscopy to eval if the thickening that had been seen on CT was considered due to diverticulitis or malignancy. Pt felt well. Tolerated diet well. Discharged to home.     On day of discharge:   ROS: denies abd pain, nausea, vomiting, diarrhea, fever, chills, CP, SOB  VS: T: 97.8; HR: 95; BP: 151/91; RR: 16; 97% on RA  Physical Exam:  GENERAL: NAD  HEENT:  EOMI, moist mucous membranes  CHEST/LUNG:  CTA b/l, no rales, wheezes, or rhonchi  HEART:  RRR, S1, S2  ABDOMEN:  BS+, soft, nontender, nondistended  EXTREMITIES: no edema, cyanosis, or calf tenderness  NERVOUS SYSTEM: answers questions and follows commands appropriately    Called PMDDr. Ron's office and left message regarding discharge.    Time spent: 45min

## 2018-03-06 NOTE — DISCHARGE NOTE ADULT - CARE PROVIDER_API CALL
Jason Lainez (DO), Gastroenterology; Internal Medicine  [address] [place] [id number]  Phone: [phone]  Fax: [phone]    Dr. Ron,   PMD  Phone: (   )    -  Fax: (   )    -

## 2018-03-06 NOTE — DISCHARGE NOTE ADULT - PATIENT PORTAL LINK FT
You can access the [facility] Patient Portal, offered by [facility], by registering with the following website: [email or web address]

## 2018-03-06 NOTE — PROGRESS NOTE ADULT - PROVIDER SPECIALTY LIST ADULT
Anesthesia
Gastroenterology
Hospitalist
Surgery
Hospitalist
Gastroenterology
Hospitalist
Hospitalist

## 2018-03-06 NOTE — PROGRESS NOTE ADULT - PROBLEM SELECTOR PROBLEM 3
Sigmoid thickening

## 2018-03-06 NOTE — PROGRESS NOTE ADULT - SUBJECTIVE AND OBJECTIVE BOX
Interval Events: s/p EGD with large duodenal ulcer.  No N/V/D.  Tolerating diet.    HPI: 87 yo white male recently Dx and treated for diverticulitis x 2 courses but who now presents c/o few days of nausea, slight LLQ abdominal pain and generalized weakness and fatigue. Admit to clear liquid and soft low calorie diet since Dx and lost 14 pounds. No fever, chills, vomiting, diarrhea, cough, dysuria or hematuria. Last colonoscopy 5 years ago reportedly negative.     CT a/p with Ulceration of the duodenal bulb with extensive wall thickening and   surrounding inflammatory change suspicious for early/contained   perforation. No extraluminal air or ascites.  2.8 cm asymmetric wall thickening in the sigmoid colon suspicious for   neoplasm. Follow-up colonoscopy is advised if not recently performed.    MEDICATIONS  (STANDING):  ALPRAZolam 0.5 milliGRAM(s) Oral three times a day  atorvastatin 10 milliGRAM(s) Oral at bedtime  buPROPion SR (12-Hour) Oral Tab/Cap - Peds 150 milliGRAM(s) Oral two times a day  heparin  Injectable 5000 Unit(s) SubCutaneous every 12 hours  pantoprazole    Tablet 40 milliGRAM(s) Oral two times a day before meals  piperacillin/tazobactam IVPB. 3.375 Gram(s) IV Intermittent every 8 hours  simethicone 80 milliGRAM(s) Chew two times a day  sucralfate 1 Gram(s) Oral three times a day before meals  tamsulosin 0.4 milliGRAM(s) Oral at bedtime    MEDICATIONS  (PRN):  acetaminophen   Tablet. 650 milliGRAM(s) Oral every 6 hours PRN pain      Allergies    No Known Allergies    Intolerances        Review of Systems:    General:  No wt loss, fevers, chills, night sweats,fatigue,   Eyes:  Good vision, no reported pain  ENT:  No sore throat, pain, runny nose, dysphagia  CV:  No pain, palpitations, hypo/hypertension  Resp:  No dyspnea, cough, tachypnea, wheezing  GI:  No pain, No nausea, No vomiting, No diarrhea, No constipation, No weight loss, No fever, No pruritis, No rectal bleeding, No melena, No dysphagia  :  No pain, bleeding, incontinence, nocturia  Muscle:  No pain, weakness  Neuro:  No weakness, tingling, memory problems  Psych:  No fatigue, insomnia, mood problems, depression  Endocrine:  No polyuria, polydypsia, cold/heat intolerance  Heme:  No petechiae, ecchymosis, easy bruisability  Skin:  No rash, tattoos, scars, edema      Vital Signs Last 24 Hrs  T(C): 36.6 (06 Mar 2018 14:21), Max: 36.7 (06 Mar 2018 04:40)  T(F): 97.8 (06 Mar 2018 14:21), Max: 98.1 (06 Mar 2018 04:40)  HR: 95 (06 Mar 2018 14:21) (93 - 98)  BP: 151/91 (06 Mar 2018 14:21) (151/91 - 187/114)  BP(mean): --  RR: 16 (06 Mar 2018 14:21) (14 - 21)  SpO2: 97% (06 Mar 2018 14:21) (96% - 98%)    PHYSICAL EXAM:    Constitutional: NAD, well-developed  HEENT: EOMI, throat clear  Neck: No LAD, supple  Respiratory: CTA and P  Cardiovascular: S1 and S2, RRR, no M  Gastrointestinal: BS+, soft, NT/ND, neg HSM,  Extremities: No peripheral edema, neg clubing, cyanosis  Vascular: 2+ peripheral pulses  Neurological: A/O x 3, no focal deficits  Psychiatric: Normal mood, normal affect  Skin: No rashes      LABS:                        13.8   7.2   )-----------( 197      ( 06 Mar 2018 06:45 )             39.4     03-06    144  |  109<H>  |  12  ----------------------------<  92  3.7   |  25  |  1.30    Ca    8.7      06 Mar 2018 06:45  Mg     1.9     03-05            RADIOLOGY & ADDITIONAL TESTS:

## 2018-03-06 NOTE — PROGRESS NOTE ADULT - PROBLEM SELECTOR PLAN 2
s/p EGD with large duodenal ulcer  f/u bx  PPI BID, Carafate 1 gram TID   needs repeat EGD and colonoscopy in 8 weeks
on CT there is concern for duodenal ulcer with perforation that is locally contained (no peritonitis)  abd exam quite benign  GI and surgery recs appreciated  c/w protonix 40mg IV BID  on low fiber diet now, as per GI recs; will have EGD on Monday to eval for duodenal ulcer
on CT there is concern for duodenal ulcer with perforation that is locally contained (no peritonitis)  abd exam quite benign  GI and surgery recs appreciated  c/w protonix 40mg IV BID  on low fiber diet now, as per GI recs; will have EGD today to eval for duodenal ulcer  Pt is a moderate risk patient for a low risk procedure and is medically optimized for the procedure.
PPI IV BID  Plan for upper gastrointestinal endoscopy monday, make NPO after midnight sunday night.   surgery eval  Zofran 4 mg IV q 6 hours prn N/V
PPI IV BID  Plan for upper gastrointestinal endoscopy monday, make NPO after midnight sunday night.   surgery eval  Zofran 4 mg IV q 6 hours prn N/V
PPI IV BID  gastrointestinal endoscopy monday,   NPO after midnight    surgery eval  Zofran 4 mg IV q 6 hours prn N/V
on CT there is concern for duodenal ulcer with perforation that is locally contained (no peritonitis)  abd exam relatively benign  GI and surgery recs appreciated  c/w protonix 40mg IV BID  on low fiber diet now, as per GI recs; will have EGD on Monday to eval for PUD

## 2018-03-06 NOTE — PROGRESS NOTE ADULT - PROBLEM SELECTOR PROBLEM 4
Constipation
BPH (benign prostatic hyperplasia)
BPH (benign prostatic hyperplasia)
Constipation
Constipation
BPH (benign prostatic hyperplasia)

## 2018-03-06 NOTE — PROGRESS NOTE ADULT - PROBLEM SELECTOR PROBLEM 1
Diverticulitis
Diverticulitis of intestine
Diverticulitis of intestine
Diverticulitis
Diverticulitis of intestine

## 2018-03-08 ENCOUNTER — EMERGENCY (EMERGENCY)
Facility: HOSPITAL | Age: 83
LOS: 1 days | Discharge: HOME | End: 2018-03-08
Attending: EMERGENCY MEDICINE | Admitting: EMERGENCY MEDICINE
Payer: MEDICARE

## 2018-03-08 VITALS
RESPIRATION RATE: 16 BRPM | DIASTOLIC BLOOD PRESSURE: 74 MMHG | OXYGEN SATURATION: 97 % | TEMPERATURE: 98 F | HEART RATE: 105 BPM | SYSTOLIC BLOOD PRESSURE: 145 MMHG

## 2018-03-08 VITALS
HEART RATE: 107 BPM | RESPIRATION RATE: 16 BRPM | SYSTOLIC BLOOD PRESSURE: 128 MMHG | WEIGHT: 169.98 LBS | DIASTOLIC BLOOD PRESSURE: 81 MMHG | OXYGEN SATURATION: 94 %

## 2018-03-08 DIAGNOSIS — K46.9 UNSPECIFIED ABDOMINAL HERNIA WITHOUT OBSTRUCTION OR GANGRENE: Chronic | ICD-10-CM

## 2018-03-08 LAB
ALBUMIN SERPL ELPH-MCNC: 2.9 G/DL — LOW (ref 3.3–5)
ALP SERPL-CCNC: 65 U/L — SIGNIFICANT CHANGE UP (ref 40–120)
ALT FLD-CCNC: 71 U/L — SIGNIFICANT CHANGE UP (ref 12–78)
AMYLASE P1 CFR SERPL: 34 U/L — SIGNIFICANT CHANGE UP (ref 25–115)
ANION GAP SERPL CALC-SCNC: 7 MMOL/L — SIGNIFICANT CHANGE UP (ref 5–17)
APPEARANCE UR: CLEAR — SIGNIFICANT CHANGE UP
AST SERPL-CCNC: 57 U/L — HIGH (ref 15–37)
BASOPHILS # BLD AUTO: 0.1 K/UL — SIGNIFICANT CHANGE UP (ref 0–0.2)
BASOPHILS NFR BLD AUTO: 1 % — SIGNIFICANT CHANGE UP (ref 0–2)
BILIRUB SERPL-MCNC: 0.6 MG/DL — SIGNIFICANT CHANGE UP (ref 0.2–1.2)
BILIRUB UR-MCNC: NEGATIVE — SIGNIFICANT CHANGE UP
BUN SERPL-MCNC: 20 MG/DL — SIGNIFICANT CHANGE UP (ref 7–23)
CALCIUM SERPL-MCNC: 8.4 MG/DL — LOW (ref 8.5–10.1)
CHLORIDE SERPL-SCNC: 106 MMOL/L — SIGNIFICANT CHANGE UP (ref 96–108)
CO2 SERPL-SCNC: 28 MMOL/L — SIGNIFICANT CHANGE UP (ref 22–31)
COLOR SPEC: YELLOW — SIGNIFICANT CHANGE UP
CREAT SERPL-MCNC: 1.5 MG/DL — HIGH (ref 0.5–1.3)
DIFF PNL FLD: ABNORMAL
EOSINOPHIL # BLD AUTO: 0.2 K/UL — SIGNIFICANT CHANGE UP (ref 0–0.5)
EOSINOPHIL NFR BLD AUTO: 3 % — SIGNIFICANT CHANGE UP (ref 0–6)
GLUCOSE SERPL-MCNC: 90 MG/DL — SIGNIFICANT CHANGE UP (ref 70–99)
GLUCOSE UR QL: NEGATIVE — SIGNIFICANT CHANGE UP
HCT VFR BLD CALC: 38.4 % — LOW (ref 39–50)
HGB BLD-MCNC: 13.2 G/DL — SIGNIFICANT CHANGE UP (ref 13–17)
KETONES UR-MCNC: NEGATIVE — SIGNIFICANT CHANGE UP
LEUKOCYTE ESTERASE UR-ACNC: NEGATIVE — SIGNIFICANT CHANGE UP
LIDOCAIN IGE QN: 110 U/L — SIGNIFICANT CHANGE UP (ref 73–393)
LYMPHOCYTES # BLD AUTO: 0.3 K/UL — LOW (ref 1–3.3)
LYMPHOCYTES # BLD AUTO: 6.1 % — LOW (ref 13–44)
MCHC RBC-ENTMCNC: 30.9 PG — SIGNIFICANT CHANGE UP (ref 27–34)
MCHC RBC-ENTMCNC: 34.4 GM/DL — SIGNIFICANT CHANGE UP (ref 32–36)
MCV RBC AUTO: 89.8 FL — SIGNIFICANT CHANGE UP (ref 80–100)
MONOCYTES # BLD AUTO: 0.4 K/UL — SIGNIFICANT CHANGE UP (ref 0–0.9)
MONOCYTES NFR BLD AUTO: 7.4 % — SIGNIFICANT CHANGE UP (ref 1–9)
NEUTROPHILS # BLD AUTO: 4.6 K/UL — SIGNIFICANT CHANGE UP (ref 1.8–7.4)
NEUTROPHILS NFR BLD AUTO: 82.5 % — HIGH (ref 43–77)
NITRITE UR-MCNC: NEGATIVE — SIGNIFICANT CHANGE UP
OB PNL STL: NEGATIVE — SIGNIFICANT CHANGE UP
PH UR: 6 — SIGNIFICANT CHANGE UP (ref 5–8)
PLATELET # BLD AUTO: 178 K/UL — SIGNIFICANT CHANGE UP (ref 150–400)
POTASSIUM SERPL-MCNC: 3.7 MMOL/L — SIGNIFICANT CHANGE UP (ref 3.5–5.3)
POTASSIUM SERPL-SCNC: 3.7 MMOL/L — SIGNIFICANT CHANGE UP (ref 3.5–5.3)
PROT SERPL-MCNC: 6.3 G/DL — SIGNIFICANT CHANGE UP (ref 6–8.3)
PROT UR-MCNC: 25 MG/DL
RBC # BLD: 4.27 M/UL — SIGNIFICANT CHANGE UP (ref 4.2–5.8)
RBC # FLD: 12.8 % — SIGNIFICANT CHANGE UP (ref 10.3–14.5)
RBC CASTS # UR COMP ASSIST: SIGNIFICANT CHANGE UP /HPF (ref 0–4)
SODIUM SERPL-SCNC: 141 MMOL/L — SIGNIFICANT CHANGE UP (ref 135–145)
SP GR SPEC: 1 — LOW (ref 1.01–1.02)
UROBILINOGEN FLD QL: NEGATIVE — SIGNIFICANT CHANGE UP
WBC # BLD: 5.5 K/UL — SIGNIFICANT CHANGE UP (ref 3.8–10.5)
WBC # FLD AUTO: 5.5 K/UL — SIGNIFICANT CHANGE UP (ref 3.8–10.5)
WBC UR QL: SIGNIFICANT CHANGE UP

## 2018-03-08 PROCEDURE — 82150 ASSAY OF AMYLASE: CPT

## 2018-03-08 PROCEDURE — 85027 COMPLETE CBC AUTOMATED: CPT

## 2018-03-08 PROCEDURE — 96361 HYDRATE IV INFUSION ADD-ON: CPT

## 2018-03-08 PROCEDURE — G8979: CPT | Mod: CK

## 2018-03-08 PROCEDURE — 83690 ASSAY OF LIPASE: CPT

## 2018-03-08 PROCEDURE — 97161 PT EVAL LOW COMPLEX 20 MIN: CPT

## 2018-03-08 PROCEDURE — 81001 URINALYSIS AUTO W/SCOPE: CPT

## 2018-03-08 PROCEDURE — 96374 THER/PROPH/DIAG INJ IV PUSH: CPT | Mod: 59

## 2018-03-08 PROCEDURE — 82272 OCCULT BLD FECES 1-3 TESTS: CPT

## 2018-03-08 PROCEDURE — G8978: CPT

## 2018-03-08 PROCEDURE — 99285 EMERGENCY DEPT VISIT HI MDM: CPT | Mod: 25

## 2018-03-08 PROCEDURE — 80053 COMPREHEN METABOLIC PANEL: CPT

## 2018-03-08 PROCEDURE — G8980: CPT

## 2018-03-08 PROCEDURE — 70450 CT HEAD/BRAIN W/O DYE: CPT

## 2018-03-08 PROCEDURE — 74177 CT ABD & PELVIS W/CONTRAST: CPT

## 2018-03-08 PROCEDURE — 93005 ELECTROCARDIOGRAM TRACING: CPT

## 2018-03-08 PROCEDURE — 74177 CT ABD & PELVIS W/CONTRAST: CPT | Mod: 26

## 2018-03-08 PROCEDURE — 99285 EMERGENCY DEPT VISIT HI MDM: CPT

## 2018-03-08 PROCEDURE — 70450 CT HEAD/BRAIN W/O DYE: CPT | Mod: 26

## 2018-03-08 PROCEDURE — 93010 ELECTROCARDIOGRAM REPORT: CPT

## 2018-03-08 RX ORDER — SODIUM CHLORIDE 9 MG/ML
1000 INJECTION INTRAMUSCULAR; INTRAVENOUS; SUBCUTANEOUS ONCE
Qty: 0 | Refills: 0 | Status: COMPLETED | OUTPATIENT
Start: 2018-03-08 | End: 2018-03-08

## 2018-03-08 RX ORDER — IOHEXOL 300 MG/ML
30 INJECTION, SOLUTION INTRAVENOUS ONCE
Qty: 0 | Refills: 0 | Status: COMPLETED | OUTPATIENT
Start: 2018-03-08 | End: 2018-03-08

## 2018-03-08 RX ORDER — ONDANSETRON 8 MG/1
4 TABLET, FILM COATED ORAL ONCE
Qty: 0 | Refills: 0 | Status: COMPLETED | OUTPATIENT
Start: 2018-03-08 | End: 2018-03-08

## 2018-03-08 RX ADMIN — SODIUM CHLORIDE 1000 MILLILITER(S): 9 INJECTION INTRAMUSCULAR; INTRAVENOUS; SUBCUTANEOUS at 07:46

## 2018-03-08 RX ADMIN — ONDANSETRON 4 MILLIGRAM(S): 8 TABLET, FILM COATED ORAL at 07:52

## 2018-03-08 RX ADMIN — IOHEXOL 30 MILLILITER(S): 300 INJECTION, SOLUTION INTRAVENOUS at 07:51

## 2018-03-08 RX ADMIN — SODIUM CHLORIDE 1000 MILLILITER(S): 9 INJECTION INTRAMUSCULAR; INTRAVENOUS; SUBCUTANEOUS at 13:02

## 2018-03-08 NOTE — ED ADULT TRIAGE NOTE - CHIEF COMPLAINT QUOTE
Pt feeling weak- was discharged from  2 days ago after admission for Diverticulitis now unable to eat and drink - fell off edge of bed this am - denies LOC or hitting head

## 2018-03-08 NOTE — ED ADULT NURSE NOTE - OBJECTIVE STATEMENT
Pt. is presenting to the ED with increased weakness and a fall out of bed. Patient was in the hospital last week for five days with a diverticulitis exacerbation. Patient's spouse at bedside reports decreased eating/drinking since he came home and increased weakness as of this morning. Patient reports no injuries sustained from the fall, his bed is low and the floor is carpet. Denies pain, hitting his head or LOC. States that he feels "out of it". IV inserted in the right AC #20, blood samples obtained/sent to lab. Rectal temperature 99.9.

## 2018-03-08 NOTE — PHYSICAL THERAPY INITIAL EVALUATION ADULT - PERTINENT HX OF CURRENT PROBLEM, REHAB EVAL
87 y/o male in ER 3/8 with generalized weakness, abdominal pain and nausea. CT head: negative. CT abd/pelvis: hiatal hernia, diverticulosis.

## 2018-03-08 NOTE — PHYSICAL THERAPY INITIAL EVALUATION ADULT - ADDITIONAL COMMENTS
Pt lives with his spouse in a house, no steps. Pt ambulates independently without device and uses SC as needed. Pt is mostly independent with ADLs. Pt was recently at this hospital and d/c home

## 2018-03-08 NOTE — ED PROVIDER NOTE - OBJECTIVE STATEMENT
pt bib ems for generalized weakness, abd pain, nausea, fall out of bed s/p recent hospital admission for duodenal ulcer and possible diverticulitis. no fevers, chills, ha, dizziness, vomiting, cp, sob, cough, diarrhea, dysuria, neck or back pain, arm or leg pain, bloody or black stools.

## 2018-05-23 ENCOUNTER — INPATIENT (INPATIENT)
Facility: HOSPITAL | Age: 83
LOS: 2 days | Discharge: NURSING, REHAB OR LONG-TERM CARE | DRG: 948 | End: 2018-05-26
Attending: INTERNAL MEDICINE | Admitting: INTERNAL MEDICINE
Payer: MEDICARE

## 2018-05-23 VITALS
WEIGHT: 160.94 LBS | OXYGEN SATURATION: 98 % | HEART RATE: 80 BPM | TEMPERATURE: 98 F | DIASTOLIC BLOOD PRESSURE: 91 MMHG | HEIGHT: 70 IN | RESPIRATION RATE: 18 BRPM | SYSTOLIC BLOOD PRESSURE: 164 MMHG

## 2018-05-23 DIAGNOSIS — F41.9 ANXIETY DISORDER, UNSPECIFIED: ICD-10-CM

## 2018-05-23 DIAGNOSIS — N40.0 BENIGN PROSTATIC HYPERPLASIA WITHOUT LOWER URINARY TRACT SYMPTOMS: ICD-10-CM

## 2018-05-23 DIAGNOSIS — R10.32 LEFT LOWER QUADRANT PAIN: ICD-10-CM

## 2018-05-23 DIAGNOSIS — E87.0 HYPEROSMOLALITY AND HYPERNATREMIA: ICD-10-CM

## 2018-05-23 DIAGNOSIS — Z29.9 ENCOUNTER FOR PROPHYLACTIC MEASURES, UNSPECIFIED: ICD-10-CM

## 2018-05-23 DIAGNOSIS — R10.9 UNSPECIFIED ABDOMINAL PAIN: ICD-10-CM

## 2018-05-23 DIAGNOSIS — G45.9 TRANSIENT CEREBRAL ISCHEMIC ATTACK, UNSPECIFIED: ICD-10-CM

## 2018-05-23 DIAGNOSIS — R31.9 HEMATURIA, UNSPECIFIED: ICD-10-CM

## 2018-05-23 DIAGNOSIS — K46.9 UNSPECIFIED ABDOMINAL HERNIA WITHOUT OBSTRUCTION OR GANGRENE: Chronic | ICD-10-CM
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
## 2018-05-23 DIAGNOSIS — R29.898 OTHER SYMPTOMS AND SIGNS INVOLVING THE MUSCULOSKELETAL SYSTEM: ICD-10-CM

## 2018-05-23 LAB
ALBUMIN SERPL ELPH-MCNC: 3.3 G/DL — SIGNIFICANT CHANGE UP (ref 3.3–5)
ALP SERPL-CCNC: 71 U/L — SIGNIFICANT CHANGE UP (ref 40–120)
ALT FLD-CCNC: 33 U/L — SIGNIFICANT CHANGE UP (ref 12–78)
ANION GAP SERPL CALC-SCNC: 8 MMOL/L — SIGNIFICANT CHANGE UP (ref 5–17)
APPEARANCE UR: CLEAR — SIGNIFICANT CHANGE UP
APTT BLD: 32.9 SEC — SIGNIFICANT CHANGE UP (ref 27.5–37.4)
AST SERPL-CCNC: 28 U/L — SIGNIFICANT CHANGE UP (ref 15–37)
BASOPHILS # BLD AUTO: 0.05 K/UL — SIGNIFICANT CHANGE UP (ref 0–0.2)
BASOPHILS NFR BLD AUTO: 0.8 % — SIGNIFICANT CHANGE UP (ref 0–2)
BILIRUB SERPL-MCNC: 0.5 MG/DL — SIGNIFICANT CHANGE UP (ref 0.2–1.2)
BILIRUB UR-MCNC: NEGATIVE — SIGNIFICANT CHANGE UP
BUN SERPL-MCNC: 25 MG/DL — HIGH (ref 7–23)
CALCIUM SERPL-MCNC: 8.8 MG/DL — SIGNIFICANT CHANGE UP (ref 8.5–10.1)
CHLORIDE SERPL-SCNC: 112 MMOL/L — HIGH (ref 96–108)
CK MB BLD-MCNC: 2.1 % — SIGNIFICANT CHANGE UP (ref 0–3.5)
CK MB BLD-MCNC: 2.3 % — SIGNIFICANT CHANGE UP (ref 0–3.5)
CK MB CFR SERPL CALC: 2.4 NG/ML — SIGNIFICANT CHANGE UP (ref 0–3.6)
CK MB CFR SERPL CALC: 2.6 NG/ML — SIGNIFICANT CHANGE UP (ref 0–3.6)
CK SERPL-CCNC: 104 U/L — SIGNIFICANT CHANGE UP (ref 26–308)
CK SERPL-CCNC: 121 U/L — SIGNIFICANT CHANGE UP (ref 26–308)
CO2 SERPL-SCNC: 26 MMOL/L — SIGNIFICANT CHANGE UP (ref 22–31)
COLOR SPEC: SIGNIFICANT CHANGE UP
CREAT SERPL-MCNC: 1.3 MG/DL — SIGNIFICANT CHANGE UP (ref 0.5–1.3)
DIFF PNL FLD: ABNORMAL
EOSINOPHIL # BLD AUTO: 0.29 K/UL — SIGNIFICANT CHANGE UP (ref 0–0.5)
EOSINOPHIL NFR BLD AUTO: 4.4 % — SIGNIFICANT CHANGE UP (ref 0–6)
GLUCOSE SERPL-MCNC: 82 MG/DL — SIGNIFICANT CHANGE UP (ref 70–99)
GLUCOSE UR QL: NEGATIVE — SIGNIFICANT CHANGE UP
HCT VFR BLD CALC: 38 % — LOW (ref 39–50)
HGB BLD-MCNC: 13 G/DL — SIGNIFICANT CHANGE UP (ref 13–17)
IMM GRANULOCYTES NFR BLD AUTO: 0.5 % — SIGNIFICANT CHANGE UP (ref 0–1.5)
INR BLD: 1.13 RATIO — SIGNIFICANT CHANGE UP (ref 0.88–1.16)
KETONES UR-MCNC: NEGATIVE — SIGNIFICANT CHANGE UP
LEUKOCYTE ESTERASE UR-ACNC: NEGATIVE — SIGNIFICANT CHANGE UP
LIDOCAIN IGE QN: 93 U/L — SIGNIFICANT CHANGE UP (ref 73–393)
LYMPHOCYTES # BLD AUTO: 0.88 K/UL — LOW (ref 1–3.3)
LYMPHOCYTES # BLD AUTO: 13.3 % — SIGNIFICANT CHANGE UP (ref 13–44)
MCHC RBC-ENTMCNC: 30.9 PG — SIGNIFICANT CHANGE UP (ref 27–34)
MCHC RBC-ENTMCNC: 34.2 GM/DL — SIGNIFICANT CHANGE UP (ref 32–36)
MCV RBC AUTO: 90.3 FL — SIGNIFICANT CHANGE UP (ref 80–100)
MONOCYTES # BLD AUTO: 0.6 K/UL — SIGNIFICANT CHANGE UP (ref 0–0.9)
MONOCYTES NFR BLD AUTO: 9.1 % — SIGNIFICANT CHANGE UP (ref 2–14)
NEUTROPHILS # BLD AUTO: 4.77 K/UL — SIGNIFICANT CHANGE UP (ref 1.8–7.4)
NEUTROPHILS NFR BLD AUTO: 71.9 % — SIGNIFICANT CHANGE UP (ref 43–77)
NITRITE UR-MCNC: NEGATIVE — SIGNIFICANT CHANGE UP
PH UR: 5 — SIGNIFICANT CHANGE UP (ref 5–8)
PLATELET # BLD AUTO: 174 K/UL — SIGNIFICANT CHANGE UP (ref 150–400)
POTASSIUM SERPL-MCNC: 4.4 MMOL/L — SIGNIFICANT CHANGE UP (ref 3.5–5.3)
POTASSIUM SERPL-SCNC: 4.4 MMOL/L — SIGNIFICANT CHANGE UP (ref 3.5–5.3)
PROT SERPL-MCNC: 6.4 G/DL — SIGNIFICANT CHANGE UP (ref 6–8.3)
PROT UR-MCNC: NEGATIVE — SIGNIFICANT CHANGE UP
PROTHROM AB SERPL-ACNC: 12.3 SEC — SIGNIFICANT CHANGE UP (ref 9.8–12.7)
RBC # BLD: 4.21 M/UL — SIGNIFICANT CHANGE UP (ref 4.2–5.8)
RBC # FLD: 14.6 % — HIGH (ref 10.3–14.5)
SODIUM SERPL-SCNC: 146 MMOL/L — HIGH (ref 135–145)
SP GR SPEC: 1.01 — SIGNIFICANT CHANGE UP (ref 1.01–1.02)
TROPONIN I SERPL-MCNC: <.015 NG/ML — SIGNIFICANT CHANGE UP (ref 0.01–0.04)
TROPONIN I SERPL-MCNC: <.015 NG/ML — SIGNIFICANT CHANGE UP (ref 0.01–0.04)
UROBILINOGEN FLD QL: NEGATIVE — SIGNIFICANT CHANGE UP
WBC # BLD: 6.62 K/UL — SIGNIFICANT CHANGE UP (ref 3.8–10.5)
WBC # FLD AUTO: 6.62 K/UL — SIGNIFICANT CHANGE UP (ref 3.8–10.5)

## 2018-05-23 PROCEDURE — 74177 CT ABD & PELVIS W/CONTRAST: CPT | Mod: 26

## 2018-05-23 PROCEDURE — 71045 X-RAY EXAM CHEST 1 VIEW: CPT | Mod: 26

## 2018-05-23 PROCEDURE — 93010 ELECTROCARDIOGRAM REPORT: CPT

## 2018-05-23 PROCEDURE — 70450 CT HEAD/BRAIN W/O DYE: CPT | Mod: 26

## 2018-05-23 PROCEDURE — 99285 EMERGENCY DEPT VISIT HI MDM: CPT

## 2018-05-23 PROCEDURE — 72070 X-RAY EXAM THORAC SPINE 2VWS: CPT | Mod: 26

## 2018-05-23 PROCEDURE — 72125 CT NECK SPINE W/O DYE: CPT | Mod: 26

## 2018-05-23 RX ORDER — SUCRALFATE 1 G
1 TABLET ORAL
Qty: 0 | Refills: 0 | Status: DISCONTINUED | OUTPATIENT
Start: 2018-05-23 | End: 2018-05-26

## 2018-05-23 RX ORDER — BUPROPION HYDROCHLORIDE 150 MG/1
300 TABLET, EXTENDED RELEASE ORAL DAILY
Qty: 0 | Refills: 0 | Status: DISCONTINUED | OUTPATIENT
Start: 2018-05-23 | End: 2018-05-26

## 2018-05-23 RX ORDER — ATORVASTATIN CALCIUM 80 MG/1
10 TABLET, FILM COATED ORAL AT BEDTIME
Qty: 0 | Refills: 0 | Status: DISCONTINUED | OUTPATIENT
Start: 2018-05-23 | End: 2018-05-26

## 2018-05-23 RX ORDER — FAMOTIDINE 10 MG/ML
20 INJECTION INTRAVENOUS DAILY
Qty: 0 | Refills: 0 | Status: DISCONTINUED | OUTPATIENT
Start: 2018-05-23 | End: 2018-05-26

## 2018-05-23 RX ORDER — DOCUSATE SODIUM 100 MG
100 CAPSULE ORAL THREE TIMES A DAY
Qty: 0 | Refills: 0 | Status: DISCONTINUED | OUTPATIENT
Start: 2018-05-23 | End: 2018-05-26

## 2018-05-23 RX ORDER — ALPRAZOLAM 0.25 MG
0.5 TABLET ORAL THREE TIMES A DAY
Qty: 0 | Refills: 0 | Status: DISCONTINUED | OUTPATIENT
Start: 2018-05-23 | End: 2018-05-26

## 2018-05-23 RX ORDER — DOXAZOSIN MESYLATE 4 MG
8 TABLET ORAL AT BEDTIME
Qty: 0 | Refills: 0 | Status: DISCONTINUED | OUTPATIENT
Start: 2018-05-23 | End: 2018-05-24

## 2018-05-23 RX ORDER — LACTOBACILLUS ACIDOPHILUS 100MM CELL
1 CAPSULE ORAL
Qty: 0 | Refills: 0 | Status: DISCONTINUED | OUTPATIENT
Start: 2018-05-23 | End: 2018-05-26

## 2018-05-23 RX ORDER — ASPIRIN/CALCIUM CARB/MAGNESIUM 324 MG
81 TABLET ORAL DAILY
Qty: 0 | Refills: 0 | Status: DISCONTINUED | OUTPATIENT
Start: 2018-05-23 | End: 2018-05-26

## 2018-05-23 RX ORDER — HYOSCYAMINE SULFATE 0.13 MG
0.12 TABLET ORAL THREE TIMES A DAY
Qty: 0 | Refills: 0 | Status: DISCONTINUED | OUTPATIENT
Start: 2018-05-23 | End: 2018-05-26

## 2018-05-23 RX ORDER — SODIUM CHLORIDE 9 MG/ML
1000 INJECTION INTRAMUSCULAR; INTRAVENOUS; SUBCUTANEOUS ONCE
Qty: 0 | Refills: 0 | Status: COMPLETED | OUTPATIENT
Start: 2018-05-23 | End: 2018-05-23

## 2018-05-23 RX ORDER — SENNA PLUS 8.6 MG/1
2 TABLET ORAL AT BEDTIME
Qty: 0 | Refills: 0 | Status: DISCONTINUED | OUTPATIENT
Start: 2018-05-23 | End: 2018-05-26

## 2018-05-23 RX ORDER — PANTOPRAZOLE SODIUM 20 MG/1
40 TABLET, DELAYED RELEASE ORAL
Qty: 0 | Refills: 0 | Status: DISCONTINUED | OUTPATIENT
Start: 2018-05-23 | End: 2018-05-24

## 2018-05-23 RX ORDER — HEPARIN SODIUM 5000 [USP'U]/ML
5000 INJECTION INTRAVENOUS; SUBCUTANEOUS EVERY 8 HOURS
Qty: 0 | Refills: 0 | Status: DISCONTINUED | OUTPATIENT
Start: 2018-05-23 | End: 2018-05-26

## 2018-05-23 RX ADMIN — SODIUM CHLORIDE 1000 MILLILITER(S): 9 INJECTION INTRAMUSCULAR; INTRAVENOUS; SUBCUTANEOUS at 14:20

## 2018-05-23 RX ADMIN — Medication 0.5 MILLIGRAM(S): at 22:42

## 2018-05-23 RX ADMIN — ATORVASTATIN CALCIUM 10 MILLIGRAM(S): 80 TABLET, FILM COATED ORAL at 22:03

## 2018-05-23 RX ADMIN — Medication 8 MILLIGRAM(S): at 22:03

## 2018-05-23 RX ADMIN — Medication 100 MILLIGRAM(S): at 22:03

## 2018-05-23 RX ADMIN — FAMOTIDINE 20 MILLIGRAM(S): 10 INJECTION INTRAVENOUS at 19:03

## 2018-05-23 RX ADMIN — HEPARIN SODIUM 5000 UNIT(S): 5000 INJECTION INTRAVENOUS; SUBCUTANEOUS at 22:03

## 2018-05-23 RX ADMIN — SENNA PLUS 2 TABLET(S): 8.6 TABLET ORAL at 22:03

## 2018-05-23 RX ADMIN — Medication 1 TABLET(S): at 22:03

## 2018-05-23 RX ADMIN — SODIUM CHLORIDE 1000 MILLILITER(S): 9 INJECTION INTRAMUSCULAR; INTRAVENOUS; SUBCUTANEOUS at 13:52

## 2018-05-23 RX ADMIN — Medication 1 TABLET(S): at 19:03

## 2018-05-23 NOTE — H&P ADULT - HISTORY OF PRESENT ILLNESS
87 y/o M with PMH of anxiety, BPH, TIA (03/2018), diverticulitis and duodenal ulcer (03/2018), [facility] after falling down stairs and unable to get up. Pt wife found pt at bottom of the stairs today, pt was awake but unable to get up. Pt states he walked down the stairs and missed the last step, then felt weak and was unable to stand. Pt doesn't remember if he passed out or if he hit his head. Pt denies any pain in back, neck, or abdomen. Pt is a poor historian.    As per wife pt has fallen 4 times since Saturday 5/19. Wife notes that pt seemed weak on Saturday. Prior to Saturday pt was walking normally without difficulty. Pt was discharged from [facility] 1 month ago, he was supposed to use a RW at home but stopped using it 2 weeks ago because he didn't feel that he needed it. Wife notes that pt was told he was not supposed to climb stairs anymore (and has no need at home since bed/bath on first floor), however pt is stubborn and insists on still using stairs. Wife notes that pt has seemed more confused since march 2018 with progressive short term memory loss. Also notes that pt has chronic tremor in b/l hands with movement which started 3-4 years ago.    In the ED VS 98.3F, HR 80, /91, RR 18, SpO2 98% on RA. Labs significant for CBC WNL, Na 146, BUN 25, GFR 49. LFTs, lipase, coag labs WNL. Cardiac enzymes x1 WNL. UA showed moderate blood. Urine Cx sent, results pending. CXR negative for active disease. EKG showed NSR@84 with prolonged QT interval of 408ms. Xray thoracic spine negative for fx, showed mild DJD. C-spine xray negative for fx, DJD noted. Head CT negative for acute fracture or intracranial bleed. In the ED pt received 1 L NS x1. Pt complained of abdominal pain in ED (as per pt he has chronic intermittent LLQ discomfort), GI Dr. Lainez consulted. Neuro Dr. Yu consulted. 87 y/o M with PMH of anxiety, BPH, TIA (03/2018), diverticulitis and duodenal ulcer (03/2018), [facility] after falling down stairs and unable to get up. Pt wife found pt at bottom of the stairs today, pt was awake but unable to get up. Wife unsure how long pt was on floor for since she was at work and his fall was unwitnessed Pt states he walked down the stairs and missed the last step, fell and then felt weak and was unable to stand. Pt doesn't remember if he passed out or if he hit his head. Pt denies any pain in back, neck, or abdomen. Pt is a poor historian.    As per wife pt has fallen 4 times since Saturday 5/19. Wife notes that pt seemed weak on Saturday. Prior to Saturday pt was walking normally without difficulty. Pt was discharged from [facility] 1 month ago, he was supposed to use a RW at home but stopped using it 2 weeks ago because he didn't feel that he needed it. Wife notes that pt was told he was not supposed to climb stairs anymore (and has no need at home since bed/bath on first floor), however pt is stubborn and insists on still using stairs. Wife notes that pt has seemed more confused since march 2018 with progressive short term memory loss. Also notes that pt has chronic tremor in b/l hands with movement which started 3-4 years ago.    In the ED VS 98.3F, HR 80, /91, RR 18, SpO2 98% on RA. Labs significant for CBC WNL, Na 146, BUN 25, GFR 49. LFTs, lipase, coag labs WNL. Cardiac enzymes x1 WNL. UA showed moderate blood. Urine Cx sent, results pending. CXR negative for active disease. EKG showed NSR@84 with prolonged QT interval of 408ms. Xray thoracic spine negative for fx, showed mild DJD. C-spine xray negative for fx, DJD noted. Head CT negative for acute fracture or intracranial bleed. Abd/pelvis CT showed 2.5 uncinate pancreatic cyst, large sliding hiatal hernia, and enlarged prostate. In the ED pt received 1 L NS x1. Pt complained of abdominal pain in ED (as per pt he has chronic intermittent LLQ discomfort), GI Dr. Lainez consulted. Neuro Dr. Yu consulted.

## 2018-05-23 NOTE — H&P ADULT - PROBLEM SELECTOR PLAN 3
- Resolved now, however pt notes having h/o chronic intermittent LLQ pain  - Abd/pelvis CT showed 2.5 uncinate pancreatic cyst, large sliding hiatal hernia, and enlarged prostate  - LFTs and lipase WNL  - GI Dr. Lainez consulted, as per GI ?diverticulitis vs constipation  - Started on bowel regimen  - Bacid 1 tab daily  - Levsin 0.125 po tid for spasms  - PPi daily - Resolved now, however pt notes having h/o chronic intermittent LLQ pain  - Abd/pelvis CT showed 2.5 uncinate pancreatic cyst, large sliding hiatal hernia, and enlarged prostate  - LFTs and lipase WNL  - GI Dr. Lainez consulted, as per GI ?diverticulitis vs constipation  - Started on bowel regimen  - Bacid 1 tab daily  - Levsin 0.125 po tid for spasms  - Continue home carafate and ppi

## 2018-05-23 NOTE — H&P ADULT - PROBLEM SELECTOR PLAN 4
- moderate blood on UA, no gross hematuria noted; per chart review pt had trace blood on UA in 03/2018 but was negative for blood prior to that  - F/u AM CBC  - Recommend pt to f/u with outpatient PCP/Uro for repeat UA

## 2018-05-23 NOTE — H&P ADULT - NSHPSOCIALHISTORY_GEN_ALL_CORE
Social History/Preventive Medicine:    Marital Status:   Occupation: Retired  Lives with: Wife  Ambulates at home: Independently, however pt was supposed to be using RW and not supposed to navigate stairs    Substance Use:  Tobacco Usage:  Denies  Alcohol Usage: Denies  Illicit Drug Usage: Denies    Health Management  Immunization Hx: Flu denies, Pneumonia denies

## 2018-05-23 NOTE — H&P ADULT - PMH
Anxiety    BPH (benign prostatic hyperplasia)    Diverticulitis    Duodenal ulcer    TIA (transient ischemic attack)

## 2018-05-23 NOTE — ED PROVIDER NOTE - OBJECTIVE STATEMENT
88 y male PMD Dr Ron, GI Dr Lainez, poor historian, as per EMS note patient found at the bottom of the stairs, awake, unable to get up  patient states he walked down the stairs and missed the last step, then was unable to stand, felt weak, no sure if loc,  states has left abdominal pain, and upper back pain.  PMH: Anxiety , Back pain,  BPH ,  TIA 88 y male PMD Dr Cristino Matute, GI Dr Lainez, no neuro, poor historian, as per EMS note patient found at the bottom of the stairs, awake, unable to get up  patient states he walked down the stairs and missed the last step, then was unable to stand, felt weak, no sure if loc,  states has left abdominal pain, and upper back pain.  PMH: Anxiety , Back pain,  BPH ,  TIA

## 2018-05-23 NOTE — ED ADULT NURSE NOTE - OBJECTIVE STATEMENT
brought to ED by EMS with reported fall. pt poor historian unable to recall event.  reports lower back pain and abd pain.  Pt alert confused c/o weakness unable to ambulate skin intact breathing even unlabored

## 2018-05-23 NOTE — ED PROVIDER NOTE - MEDICAL DECISION MAKING DETAILS
unwitnessed fall, poor historian, unsure if syncopal episode, weakness, back pain, abdominal pain, labs, ct scans

## 2018-05-23 NOTE — ED PROVIDER NOTE - CARE PLAN
Principal Discharge DX:	Weakness of both legs  Secondary Diagnosis:	Abdominal pain  Secondary Diagnosis:	Fall, initial encounter

## 2018-05-23 NOTE — CONSULT NOTE ADULT - PROBLEM SELECTOR RECOMMENDATION 9
? diverticulitis vs constipation    bowel regimen  in and out  ppi once a day   bacid one tab po tid  levsin 0.125 po tid for spasms

## 2018-05-23 NOTE — H&P ADULT - PROBLEM SELECTOR PLAN 5
- Mild, Na 146 in setting of slightly elevated BUN, likely 2/2 mild dehydration  - s/p 1L NS  - F/u AM BMP

## 2018-05-23 NOTE — H&P ADULT - NSHPREVIEWOFSYSTEMS_GEN_ALL_CORE
Constitutional: generalized weakness; denies fever, chills  HEENT: difficulty hearing, denies blurry vision  Respiratory: denies SOB, FINE, cough, wheezing  Cardiovascular: denies CP, palpitations, edema  Gastrointestinal: constipation; denies nausea, vomiting, diarrhea, abdominal pain, melena, hematochezia   Genitourinary: Urinary frequency; denies dysuria, urgency, hematuria   Skin/Breast: denies rash, itching  Musculoskeletal: denies myalgias, joint swelling, muscle weakness  Neurologic: denies headache, weakness, dizziness, paresthesias, numbness/tingling  Psychiatric: denies feeling anxious, depressed  Hematology/Oncology: denies bruising  ROS negative except as noted above

## 2018-05-23 NOTE — H&P ADULT - PROBLEM SELECTOR PLAN 9
- DVT prophylaxis with subq heparin    IMPROVE VTE Individual Risk Assessment          RISK                                                          Points  [  ] Previous VTE                                                3  [  ] Thrombophilia                                             2  [  ] Lower limb paralysis                                   2        (unable to hold up >15 seconds)    [  ] Current Cancer                                             2         (within 6 months)  [  ] Immobilization > 24 hrs                              1  [  ] ICU/CCU stay > 24 hours                             1  [  ] Age > 60                                                         1    IMPROVE VTE Score: 1

## 2018-05-23 NOTE — ED PROVIDER NOTE - PROGRESS NOTE DETAILS
wife arrived to bedside, states patient had unwitnessed fall she found patient on kitchen floor, awake , alert, stating he could not get up,  wife states patient fell yesterday unwitnessed fall off the last step of the cellar stairs, she found him on the floor, awake alert,  refused to come to ed yesterday spoke with Dr Yu, case discussed, will see patient spoke with Dr Lainez, case discussed, will see patient call out to Dr Perlman, awaiting call back spoke with Dr Perlman, case discussed, will admit patient

## 2018-05-23 NOTE — CONSULT NOTE ADULT - SUBJECTIVE AND OBJECTIVE BOX
Chief Complaint:  Patient is a 88y old  Male who presents with a chief complaint of   · HPI Objective Statement: 88 y male PMD Dr Cristino Matute, GI Dr Lainez, no neuro, poor historian, as per EMS note patient found at the bottom of the stairs, awake, unable to get up  patient states he walked down the stairs and missed the last step, then was unable to stand, felt weak, no sure if loc,  states has left abdominal pain, and upper back pain. PMH: Anxiety , Back pain,  BPH ,  TIA	    s/p fall had abd pain post fall made it worse no n/v/d/f/c    Allergies:  No Known Allergies      Medications:      PMHX/PSHX:  Back pain  TIA (transient ischemic attack)  BPH (benign prostatic hyperplasia)  Anxiety  Abdominal hernia      Family history:  No pertinent family history in first degree relatives      Social History: lives at home no etoh no cigs no ivda      ROS:     General:  No wt loss, fevers, chills, night sweats, fatigue,   Eyes:  Good vision, no reported pain  ENT:  No sore throat, pain, runny nose, dysphagia  CV:  No pain, palpitations, hypo/hypertension  Resp:  No dyspnea, cough, tachypnea, wheezing  GI:  No pain, No nausea, No vomiting, No diarrhea, No constipation, No weight loss, No fever, No pruritis, No rectal bleeding, No tarry stools, No dysphagia,  :  No pain, bleeding, incontinence, nocturia  Muscle:  No pain, weakness  Neuro:  No weakness, tingling, memory problems  Psych:  No fatigue, insomnia, mood problems, depression  Endocrine:  No polyuria, polydipsia, cold/heat intolerance  Heme:  No petechiae, ecchymosis, easy bruisability  Skin:  No rash, tattoos, scars, edema      PHYSICAL EXAM:   Vital Signs:  Vital Signs Last 24 Hrs  T(C): 36.8 (23 May 2018 12:25), Max: 36.8 (23 May 2018 12:25)  T(F): 98.3 (23 May 2018 12:25), Max: 98.3 (23 May 2018 12:25)  HR: 80 (23 May 2018 12:25) (80 - 80)  BP: 164/91 (23 May 2018 12:25) (164/91 - 164/91)  BP(mean): --  RR: 18 (23 May 2018 12:25) (18 - 18)  SpO2: 98% (23 May 2018 12:25) (98% - 98%)  Daily Height in cm: 177.8 (23 May 2018 12:25)    Daily     GENERAL:  Appears stated age, well-groomed, well-nourished, no distress  HEENT:  NC/AT,  conjunctivae clear and pink, no thyromegaly, nodules, adenopathy, no JVD, sclera -anicteric  CHEST:  Full & symmetric excursion, no increased effort, breath sounds clear  HEART:  Regular rhythm, S1, S2, no murmur/rub/S3/S4, no abdominal bruit, no edema  ABDOMEN:  Soft, non-tender, non-distended, normoactive bowel sounds,  no masses ,no hepato-splenomegaly, no signs of chronic liver disease  EXTEREMITIES:  no cyanosis,clubbing or edema  SKIN:  No rash/erythema/ecchymoses/petechiae/wounds/abscess/warm/dry  NEURO:  Alert, oriented, no asterixis, no tremor, no encephalopathy    LABS:                        13.0   6.62  )-----------( 174      ( 23 May 2018 13:38 )             38.0     05-23    146<H>  |  112<H>  |  25<H>  ----------------------------<  82  4.4   |  26  |  1.30    Ca    8.8      23 May 2018 13:38    TPro  6.4  /  Alb  3.3  /  TBili  0.5  /  DBili  x   /  AST  28  /  ALT  33  /  AlkPhos  71  05-23    LIVER FUNCTIONS - ( 23 May 2018 13:38 )  Alb: 3.3 g/dL / Pro: 6.4 g/dL / ALK PHOS: 71 U/L / ALT: 33 U/L / AST: 28 U/L / GGT: x           PT/INR - ( 23 May 2018 13:38 )   PT: 12.3 sec;   INR: 1.13 ratio         PTT - ( 23 May 2018 13:38 )  PTT:32.9 sec  Urinalysis Basic - ( 23 May 2018 13:55 )    Color: Pale Yellow / Appearance: Clear / S.010 / pH: x  Gluc: x / Ketone: Negative  / Bili: Negative / Urobili: Negative   Blood: x / Protein: Negative / Nitrite: Negative   Leuk Esterase: Negative / RBC: 11-25 /HPF / WBC Negative   Sq Epi: x / Non Sq Epi: Negative / Bacteria: Negative      Amylase Serum--      Lipase serum93       Ammonia--      Imaging:

## 2018-05-23 NOTE — ED PROVIDER NOTE - ATTENDING CONTRIBUTION TO CARE
Found on floor x 2 over the past 2-days, unexplained etiology now here for evaluation. Only complaints are chronic LLQ abdominal pain. No headache/chest-abdominal-hip pains. Exam revealed male in NAD with normal hear, lung and neuro exam. I agree with plan and management outlined by PA.

## 2018-05-23 NOTE — ED ADULT NURSE REASSESSMENT NOTE - NS ED NURSE REASSESS COMMENT FT1
pt moved to room 15a for further eval
Pt. lying in bed comfortably in NAD. Denies any pain at this time. VSS, breathing well on RA. Awaiting rm assignment. Will continue to monitor.
Pt. noted hypertensive when orthostatic vital signs being completed. Pt. denied pain or discomfort at this time. Dr. Perlman made aware. Pt. ok to transfer to floor.
Received pt. from Intake by JENN April. Pt. A&Ox3 but with a h/o confusion and short-term memory. Brought to ER via EMS s/p fall this AM. Wife at bedside states she found him on the floor and was unable to get him up. Pt. says he tripped and fell on his back and hit his head. Denies any pain at this time but did c/o lower back pain after fall. wife found pt. conscious but was unable to pick pt. up and called ambulance. Unknown LOC. Frequent falling over the past few weeks. Last fall was yesterday. Walks with walker at baseline over the past 2 months after TIA. Skin appears intact, no lacerations or bruising noted on exam. LLQ tender on palpation, wife states he has a h/o severe abdominal pain with unknown cause. Denies any recent illness and there's no signs of infection. #20g IV placed in right AC, labs sent as ordered by MD. WALTER in triage, breathing well on RA. Respirations are even and unlabored. Will continue to monitor.
Pt. received alert to person and situation. Denied complaints of pain at this time.

## 2018-05-23 NOTE — H&P ADULT - PROBLEM SELECTOR PLAN 1
- - Admit to [facility] with remote telemetry  - Pt fell down stairs, states he missed step however pt unreliable historian; mechanical fall vs syncope  - - Admit to [facility] with remote telemetry  - Pt fell down stairs, states he missed step however pt unreliable historian; mechanical fall vs syncope  -  CXR negative for active disease.   - EKG showed NSR@84 with prolonged QT interval of 408ms.   - Xray thoracic spine negative for fx, showed mild DJD  - C-spine xray negative for fx, DJD noted  - Head CT negative for acute fracture or intracranial bleed  - F/u PT eval  - F/u AM labs - Admit to [facility] with remote telemetry  - Pt fell down stairs, states he missed step however pt unreliable historian; mechanical fall vs syncope  -  CXR negative for active disease.   - EKG showed NSR@84 with prolonged QT interval of 408ms.   - Xray thoracic spine negative for fx, showed mild DJD  - C-spine xray negative for fx, DJD noted  - Head CT negative for acute fracture or intracranial bleed  - Fall precautions, bed alarm  - F/u PT eval  - F/u CE x2 at 19:00 and 01:00  - F/u AM labs

## 2018-05-23 NOTE — ED PROVIDER NOTE - UNABLE TO OBTAIN
poor historian Dementia poor historian, unwitnessed fall, unknown if syncopal episode, back pain, abdominal pain

## 2018-05-23 NOTE — H&P ADULT - ASSESSMENT
87 y/o M with PMH of anxiety, BPH, TIA (03/2018), h/o diverticulitis and duodenal ulcer (03/2018), [facility] after falling down stairs and unable to get up, admitted for fall - mechanical fall vs syncope.

## 2018-05-23 NOTE — H&P ADULT - NSHPPHYSICALEXAM_GEN_ALL_CORE
Physical Exam:  General: Well developed, well nourished, NAD, elderly  HEENT: NCAT, PERRLA, EOMI b/ll, moist mucous membranes   Neck: Supple, nontender  Neurology: A&Ox3, nonfocal, CN II-XII grossly intact, sensation intact, intention tremor noted in b/l hands, pt able to move all 4 extremities, muscle strength 4+/5 b/l UE and LE  Respiratory: CTA B/L, No W/R/R  CV: RRR, +S1/S2, no murmurs, rubs or gallops  Abdominal: Soft, NT, ND +BSx4  Extremities: No LE edema, + peripheral pulses  MSK: no joint erythema or warmth, no joint swelling   Skin: warm, dry, normal color, no rash; several old scabs on b/l shins, small abrasion noted on R elbow

## 2018-05-23 NOTE — H&P ADULT - PROBLEM SELECTOR PLAN 2
- -  - Remote tele - Remote tele to r/o arrhythmia  - F/u orthostatics to r/o orthostatic hypotension  - Cardio Dr. Carter consulted, f/u recs  - Neuro Dr. Yu consulted, f/u recs  - F/u Echo  - F/u CE x2 at 19:00 and 01:00

## 2018-05-24 DIAGNOSIS — K86.9 DISEASE OF PANCREAS, UNSPECIFIED: ICD-10-CM

## 2018-05-24 LAB
ANION GAP SERPL CALC-SCNC: 9 MMOL/L — SIGNIFICANT CHANGE UP (ref 5–17)
BASOPHILS # BLD AUTO: 0.04 K/UL — SIGNIFICANT CHANGE UP (ref 0–0.2)
BASOPHILS NFR BLD AUTO: 0.9 % — SIGNIFICANT CHANGE UP (ref 0–2)
BUN SERPL-MCNC: 18 MG/DL — SIGNIFICANT CHANGE UP (ref 7–23)
CALCIUM SERPL-MCNC: 8.6 MG/DL — SIGNIFICANT CHANGE UP (ref 8.5–10.1)
CHLORIDE SERPL-SCNC: 111 MMOL/L — HIGH (ref 96–108)
CK MB BLD-MCNC: 1.8 % — SIGNIFICANT CHANGE UP (ref 0–3.5)
CK MB CFR SERPL CALC: 1.9 NG/ML — SIGNIFICANT CHANGE UP (ref 0–3.6)
CK SERPL-CCNC: 106 U/L — SIGNIFICANT CHANGE UP (ref 26–308)
CO2 SERPL-SCNC: 26 MMOL/L — SIGNIFICANT CHANGE UP (ref 22–31)
CREAT SERPL-MCNC: 1.3 MG/DL — SIGNIFICANT CHANGE UP (ref 0.5–1.3)
CULTURE RESULTS: NO GROWTH — SIGNIFICANT CHANGE UP
EOSINOPHIL # BLD AUTO: 0.49 K/UL — SIGNIFICANT CHANGE UP (ref 0–0.5)
EOSINOPHIL NFR BLD AUTO: 11.2 % — HIGH (ref 0–6)
GLUCOSE SERPL-MCNC: 84 MG/DL — SIGNIFICANT CHANGE UP (ref 70–99)
HCT VFR BLD CALC: 35.9 % — LOW (ref 39–50)
HGB BLD-MCNC: 12.5 G/DL — LOW (ref 13–17)
IMM GRANULOCYTES NFR BLD AUTO: 0.5 % — SIGNIFICANT CHANGE UP (ref 0–1.5)
LYMPHOCYTES # BLD AUTO: 0.79 K/UL — LOW (ref 1–3.3)
LYMPHOCYTES # BLD AUTO: 18.1 % — SIGNIFICANT CHANGE UP (ref 13–44)
MAGNESIUM SERPL-MCNC: 2.2 MG/DL — SIGNIFICANT CHANGE UP (ref 1.6–2.6)
MCHC RBC-ENTMCNC: 31.2 PG — SIGNIFICANT CHANGE UP (ref 27–34)
MCHC RBC-ENTMCNC: 34.8 GM/DL — SIGNIFICANT CHANGE UP (ref 32–36)
MCV RBC AUTO: 89.5 FL — SIGNIFICANT CHANGE UP (ref 80–100)
MONOCYTES # BLD AUTO: 0.57 K/UL — SIGNIFICANT CHANGE UP (ref 0–0.9)
MONOCYTES NFR BLD AUTO: 13 % — SIGNIFICANT CHANGE UP (ref 2–14)
NEUTROPHILS # BLD AUTO: 2.46 K/UL — SIGNIFICANT CHANGE UP (ref 1.8–7.4)
NEUTROPHILS NFR BLD AUTO: 56.3 % — SIGNIFICANT CHANGE UP (ref 43–77)
PHOSPHATE SERPL-MCNC: 3 MG/DL — SIGNIFICANT CHANGE UP (ref 2.5–4.5)
PLATELET # BLD AUTO: 173 K/UL — SIGNIFICANT CHANGE UP (ref 150–400)
POTASSIUM SERPL-MCNC: 3.5 MMOL/L — SIGNIFICANT CHANGE UP (ref 3.5–5.3)
POTASSIUM SERPL-SCNC: 3.5 MMOL/L — SIGNIFICANT CHANGE UP (ref 3.5–5.3)
RBC # BLD: 4.01 M/UL — LOW (ref 4.2–5.8)
RBC # FLD: 14.4 % — SIGNIFICANT CHANGE UP (ref 10.3–14.5)
SODIUM SERPL-SCNC: 146 MMOL/L — HIGH (ref 135–145)
SPECIMEN SOURCE: SIGNIFICANT CHANGE UP
TROPONIN I SERPL-MCNC: <.015 NG/ML — SIGNIFICANT CHANGE UP (ref 0.01–0.04)
WBC # BLD: 4.37 K/UL — SIGNIFICANT CHANGE UP (ref 3.8–10.5)
WBC # FLD AUTO: 4.37 K/UL — SIGNIFICANT CHANGE UP (ref 3.8–10.5)

## 2018-05-24 PROCEDURE — 93306 TTE W/DOPPLER COMPLETE: CPT | Mod: 26

## 2018-05-24 RX ORDER — POTASSIUM CHLORIDE 20 MEQ
40 PACKET (EA) ORAL ONCE
Qty: 0 | Refills: 0 | Status: COMPLETED | OUTPATIENT
Start: 2018-05-24 | End: 2018-05-24

## 2018-05-24 RX ORDER — POTASSIUM CHLORIDE 20 MEQ
40 PACKET (EA) ORAL ONCE
Qty: 0 | Refills: 0 | Status: DISCONTINUED | OUTPATIENT
Start: 2018-05-24 | End: 2018-05-24

## 2018-05-24 RX ORDER — TAMSULOSIN HYDROCHLORIDE 0.4 MG/1
0.4 CAPSULE ORAL AT BEDTIME
Qty: 0 | Refills: 0 | Status: DISCONTINUED | OUTPATIENT
Start: 2018-05-24 | End: 2018-05-26

## 2018-05-24 RX ADMIN — Medication 100 MILLIGRAM(S): at 22:25

## 2018-05-24 RX ADMIN — HEPARIN SODIUM 5000 UNIT(S): 5000 INJECTION INTRAVENOUS; SUBCUTANEOUS at 22:26

## 2018-05-24 RX ADMIN — Medication 1 GRAM(S): at 18:16

## 2018-05-24 RX ADMIN — Medication 40 MILLIEQUIVALENT(S): at 08:38

## 2018-05-24 RX ADMIN — HEPARIN SODIUM 5000 UNIT(S): 5000 INJECTION INTRAVENOUS; SUBCUTANEOUS at 06:13

## 2018-05-24 RX ADMIN — Medication 100 MILLIGRAM(S): at 13:15

## 2018-05-24 RX ADMIN — Medication 1 GRAM(S): at 13:15

## 2018-05-24 RX ADMIN — Medication 1 TABLET(S): at 22:25

## 2018-05-24 RX ADMIN — Medication 1 TABLET(S): at 08:39

## 2018-05-24 RX ADMIN — Medication 1 TABLET(S): at 13:12

## 2018-05-24 RX ADMIN — HEPARIN SODIUM 5000 UNIT(S): 5000 INJECTION INTRAVENOUS; SUBCUTANEOUS at 13:12

## 2018-05-24 RX ADMIN — PANTOPRAZOLE SODIUM 40 MILLIGRAM(S): 20 TABLET, DELAYED RELEASE ORAL at 06:13

## 2018-05-24 RX ADMIN — BUPROPION HYDROCHLORIDE 300 MILLIGRAM(S): 150 TABLET, EXTENDED RELEASE ORAL at 13:14

## 2018-05-24 RX ADMIN — ATORVASTATIN CALCIUM 10 MILLIGRAM(S): 80 TABLET, FILM COATED ORAL at 22:25

## 2018-05-24 RX ADMIN — Medication 1 GRAM(S): at 06:13

## 2018-05-24 RX ADMIN — SENNA PLUS 2 TABLET(S): 8.6 TABLET ORAL at 22:25

## 2018-05-24 RX ADMIN — Medication 100 MILLIGRAM(S): at 06:13

## 2018-05-24 RX ADMIN — Medication 1 TABLET(S): at 18:16

## 2018-05-24 RX ADMIN — TAMSULOSIN HYDROCHLORIDE 0.4 MILLIGRAM(S): 0.4 CAPSULE ORAL at 22:25

## 2018-05-24 RX ADMIN — Medication 0.5 MILLIGRAM(S): at 22:35

## 2018-05-24 RX ADMIN — Medication 81 MILLIGRAM(S): at 13:14

## 2018-05-24 RX ADMIN — FAMOTIDINE 20 MILLIGRAM(S): 10 INJECTION INTRAVENOUS at 13:15

## 2018-05-24 NOTE — CONSULT NOTE ADULT - SUBJECTIVE AND OBJECTIVE BOX
History of Present Illness: The patient is an 88 year old male with a history of BPH, TIA, anxiety, duodenal ulcer who was admitted yesterday after being found down. The patient is a poor historian, unclear if underlying dementia or due to hearing issues. He does not recall falling yesterday although notes state he was found down by his wife for unknown duration. He has had multiple falls recently. He notes feeling weak and lightheaded at times.    Past Medical/Surgical History:  BPH, TIA, anxiety, duodenal ulcer    Medications:  MEDICATIONS  (STANDING):  aspirin enteric coated 81 milliGRAM(s) Oral daily  atorvastatin 10 milliGRAM(s) Oral at bedtime  buPROPion XL . 300 milliGRAM(s) Oral daily  docusate sodium 100 milliGRAM(s) Oral three times a day  doxazosin 8 milliGRAM(s) Oral at bedtime  famotidine    Tablet 20 milliGRAM(s) Oral daily  heparin  Injectable 5000 Unit(s) SubCutaneous every 8 hours  lactobacillus acidophilus 1 Tablet(s) Oral four times a day with meals  senna 2 Tablet(s) Oral at bedtime  sucralfate 1 Gram(s) Oral <User Schedule>    MEDICATIONS  (PRN):  ALPRAZolam 0.5 milliGRAM(s) Oral three times a day PRN anxiety  hyoscyamine SL 0.125 milliGRAM(s) SubLingual three times a day PRN Infant Colic Symptoms      Family History: Non-contributory family history of premature cardiovascular atherosclerotic disease    Social History: No tobacco, alcohol or drug use    Review of Systems:  General: No fevers, chills, weight loss or gain  Skin: No rashes, color changes  Cardiovascular: No chest pain, orthopnea  Respiratory: No shortness of breath, cough  Gastrointestinal: No nausea, abdominal pain  Genitourinary: No incontinence, pain with urination  Musculoskeletal: No pain, swelling, decreased range of motion  Neurological: No headache, weakness  Psychiatric: No depression, anxiety  Endocrine: No weight loss or gain, increased thirst  All other systems are comprehensively negative.    Physical Exam:  Vitals:        Vital Signs Last 24 Hrs  T(C): 36.4 (24 May 2018 05:37), Max: 36.8 (23 May 2018 12:25)  T(F): 97.5 (24 May 2018 05:37), Max: 98.3 (23 May 2018 12:25)  HR: 88 (24 May 2018 08:11) (72 - 96)  BP: 166/81 (24 May 2018 05:37) (153/89 - 184/91)  BP(mean): --  RR: 16 (24 May 2018 05:37) (16 - 19)  SpO2: 94% (24 May 2018 05:37) (94% - 99%)  General: NAD  HEENT: MMM  Neck: No JVD, no carotid bruit  Lungs: CTAB  CV: RRR, nl S1/S2, no M/R/G  Abdomen: S/NT/ND, +BS  Extremities: No LE edema, no cyanosis  Neuro: AAOx3, non-focal  Skin: No rash    Labs:                        12.5   4.37  )-----------( 173      ( 24 May 2018 07:26 )             35.9     05-24    146<H>  |  111<H>  |  18  ----------------------------<  84  3.5   |  26  |  1.30    Ca    8.6      24 May 2018 07:26  Phos  3.0     05-24  Mg     2.2     05-24    TPro  6.4  /  Alb  3.3  /  TBili  0.5  /  DBili  x   /  AST  28  /  ALT  33  /  AlkPhos  71  05-23    CARDIAC MARKERS ( 24 May 2018 01:19 )  <.015 ng/mL / x     / 106 U/L / x     / 1.9 ng/mL  CARDIAC MARKERS ( 23 May 2018 19:21 )  <.015 ng/mL / x     / 121 U/L / x     / 2.6 ng/mL  CARDIAC MARKERS ( 23 May 2018 13:38 )  <.015 ng/mL / x     / 104 U/L / x     / 2.4 ng/mL      PT/INR - ( 23 May 2018 13:38 )   PT: 12.3 sec;   INR: 1.13 ratio         PTT - ( 23 May 2018 13:38 )  PTT:32.9 sec    ECG: NSR, normal axis, mildly prolonged QTc at 480

## 2018-05-24 NOTE — PROGRESS NOTE ADULT - PROBLEM SELECTOR PLAN 1
resolved  no obstruction seen on CT  famotidine once a day   cont bowel regimen  bacid one tab po tid  levsin 0.125 po tid for spasms  serial abd exams  monitor for bms

## 2018-05-24 NOTE — PHYSICAL THERAPY INITIAL EVALUATION ADULT - ADDITIONAL COMMENTS
pt lives in a house w/ stairs/rail.  Pt report is a community ambulator.  Pt has home health aide 8 hours/day x 3 days/wk.

## 2018-05-24 NOTE — PROGRESS NOTE ADULT - PROBLEM SELECTOR PLAN 1
- Pt fell down stairs, states he missed step however pt unreliable historian; mechanical fall vs syncope  -  CXR negative for active disease.   - EKG showed NSR@84 with prolonged QT interval of 408ms.   - Xray thoracic spine negative for fx, showed mild DJD  - C-spine xray negative for fx, DJD noted  - Head CT negative for acute fracture or intracranial bleed  - Fall precautions, bed alarm  - PT eval, recommended [facility]  - F/u AM labs

## 2018-05-24 NOTE — CONSULT NOTE ADULT - ASSESSMENT
The patient is an 88 year old male with a history of BPH, TIA, anxiety, duodenal ulcer who was admitted yesterday after being found down, possible syncope.    Plan:  - ECG with mild QTc prolongation, otherwise no predisposing features for syncope  - Monitor on telemetry  - Check echocardiogram  - Check orthostatics  - If orthostatics negative, may need antihypertensives for elevated BP  - Cardiac enzymes negative  - Neurology eval  - PT

## 2018-05-24 NOTE — PHYSICAL THERAPY INITIAL EVALUATION ADULT - PERTINENT HX OF CURRENT PROBLEM, REHAB EVAL
89 y/o M [facility] after falling down stairs and unable to get up. As per wife pt has fallen 4x since 5/19. UA showed moderate blood. CXR negative for active disease. Xray T-spine/C-spine negative for fx. Head CT negative for acute fracture or intracranial bleed. Abd/pelvis CT showed 2.5 uncinate pancreatic cyst, large sliding hiatal hernia,

## 2018-05-25 ENCOUNTER — TRANSCRIPTION ENCOUNTER (OUTPATIENT)
Age: 83
End: 2018-05-25

## 2018-05-25 LAB
ANION GAP SERPL CALC-SCNC: 9 MMOL/L — SIGNIFICANT CHANGE UP (ref 5–17)
BUN SERPL-MCNC: 23 MG/DL — SIGNIFICANT CHANGE UP (ref 7–23)
CALCIUM SERPL-MCNC: 8.8 MG/DL — SIGNIFICANT CHANGE UP (ref 8.5–10.1)
CHLORIDE SERPL-SCNC: 112 MMOL/L — HIGH (ref 96–108)
CO2 SERPL-SCNC: 25 MMOL/L — SIGNIFICANT CHANGE UP (ref 22–31)
CREAT SERPL-MCNC: 1.5 MG/DL — HIGH (ref 0.5–1.3)
GLUCOSE SERPL-MCNC: 93 MG/DL — SIGNIFICANT CHANGE UP (ref 70–99)
HCT VFR BLD CALC: 38.2 % — LOW (ref 39–50)
HGB BLD-MCNC: 12.9 G/DL — LOW (ref 13–17)
MCHC RBC-ENTMCNC: 30.1 PG — SIGNIFICANT CHANGE UP (ref 27–34)
MCHC RBC-ENTMCNC: 33.8 GM/DL — SIGNIFICANT CHANGE UP (ref 32–36)
MCV RBC AUTO: 89.3 FL — SIGNIFICANT CHANGE UP (ref 80–100)
NRBC # BLD: 0 /100 WBCS — SIGNIFICANT CHANGE UP (ref 0–0)
PLATELET # BLD AUTO: 177 K/UL — SIGNIFICANT CHANGE UP (ref 150–400)
POTASSIUM SERPL-MCNC: 3.9 MMOL/L — SIGNIFICANT CHANGE UP (ref 3.5–5.3)
POTASSIUM SERPL-SCNC: 3.9 MMOL/L — SIGNIFICANT CHANGE UP (ref 3.5–5.3)
RBC # BLD: 4.28 M/UL — SIGNIFICANT CHANGE UP (ref 4.2–5.8)
RBC # FLD: 14.1 % — SIGNIFICANT CHANGE UP (ref 10.3–14.5)
SODIUM SERPL-SCNC: 146 MMOL/L — HIGH (ref 135–145)
WBC # BLD: 5.04 K/UL — SIGNIFICANT CHANGE UP (ref 3.8–10.5)
WBC # FLD AUTO: 5.04 K/UL — SIGNIFICANT CHANGE UP (ref 3.8–10.5)

## 2018-05-25 RX ORDER — ASPIRIN/CALCIUM CARB/MAGNESIUM 324 MG
1 TABLET ORAL
Qty: 0 | Refills: 0 | COMMUNITY

## 2018-05-25 RX ORDER — DOCUSATE SODIUM 100 MG
1 CAPSULE ORAL
Qty: 42 | Refills: 0 | OUTPATIENT
Start: 2018-05-25 | End: 2018-06-07

## 2018-05-25 RX ORDER — HYOSCYAMINE SULFATE 0.13 MG
0 TABLET ORAL
Qty: 0 | Refills: 0 | COMMUNITY
Start: 2018-05-25

## 2018-05-25 RX ORDER — TAMSULOSIN HYDROCHLORIDE 0.4 MG/1
1 CAPSULE ORAL
Qty: 0 | Refills: 0 | COMMUNITY
Start: 2018-05-25

## 2018-05-25 RX ORDER — SENNA PLUS 8.6 MG/1
2 TABLET ORAL
Qty: 28 | Refills: 0 | OUTPATIENT
Start: 2018-05-25 | End: 2018-06-07

## 2018-05-25 RX ORDER — ASPIRIN/CALCIUM CARB/MAGNESIUM 324 MG
1 TABLET ORAL
Qty: 0 | Refills: 0 | COMMUNITY
Start: 2018-05-25

## 2018-05-25 RX ADMIN — Medication 1 GRAM(S): at 11:44

## 2018-05-25 RX ADMIN — Medication 1 GRAM(S): at 06:00

## 2018-05-25 RX ADMIN — Medication 100 MILLIGRAM(S): at 13:26

## 2018-05-25 RX ADMIN — BUPROPION HYDROCHLORIDE 300 MILLIGRAM(S): 150 TABLET, EXTENDED RELEASE ORAL at 11:44

## 2018-05-25 RX ADMIN — FAMOTIDINE 20 MILLIGRAM(S): 10 INJECTION INTRAVENOUS at 11:47

## 2018-05-25 RX ADMIN — Medication 1 TABLET(S): at 17:14

## 2018-05-25 RX ADMIN — Medication 1 GRAM(S): at 17:14

## 2018-05-25 RX ADMIN — HEPARIN SODIUM 5000 UNIT(S): 5000 INJECTION INTRAVENOUS; SUBCUTANEOUS at 13:26

## 2018-05-25 RX ADMIN — TAMSULOSIN HYDROCHLORIDE 0.4 MILLIGRAM(S): 0.4 CAPSULE ORAL at 22:26

## 2018-05-25 RX ADMIN — Medication 1 TABLET(S): at 11:44

## 2018-05-25 RX ADMIN — Medication 81 MILLIGRAM(S): at 11:45

## 2018-05-25 RX ADMIN — Medication 100 MILLIGRAM(S): at 05:55

## 2018-05-25 RX ADMIN — HEPARIN SODIUM 5000 UNIT(S): 5000 INJECTION INTRAVENOUS; SUBCUTANEOUS at 05:55

## 2018-05-25 RX ADMIN — Medication 1 TABLET(S): at 08:21

## 2018-05-25 RX ADMIN — HEPARIN SODIUM 5000 UNIT(S): 5000 INJECTION INTRAVENOUS; SUBCUTANEOUS at 22:26

## 2018-05-25 RX ADMIN — Medication 0.5 MILLIGRAM(S): at 22:25

## 2018-05-25 NOTE — DISCHARGE NOTE ADULT - CARE PROVIDER_API CALL
Kiko Carcamo), Neurology  [address] [place] [address]  Phone: [phone]  Fax: [phone] Kiko Carcamo), Neurology  [address] [place] [address]  Phone: [phone]  Fax: [phone]    Madelaine Peguero  [address] [place] [address] [place] [address]  Phone: [phone]  Fax: (   )    -    Jeremi Leary), Gastroenterology  [address] [place] [address]  Phone: [phone]  Fax: [phone]

## 2018-05-25 NOTE — DISCHARGE NOTE ADULT - HOSPITAL COURSE
87 y/o M with PMH of anxiety, BPH, TIA (03/2018), diverticulitis and duodenal ulcer (03/2018), [facility] after falling down stairs and unable to get up. Pt wife found pt at bottom of the stairs today, pt was awake but unable to get up. Wife unsure how long pt was on floor for since she was at work and his fall was unwitnessed Pt states he walked down the stairs and missed the last step, fell and then felt weak and was unable to stand. Pt doesn't remember if he passed out or if he hit his head. Pt denies any pain in back, neck, or abdomen. Pt is a poor historian. Wife notes that pt has seemed more confused since march 2018 with progressive short term memory loss. Also notes that pt has chronic tremor in b/l hands with movement which started 3-4 years ago. Cardiac enzymes x3 WNL. UA showed moderate blood. CXR negative for active disease. EKG showed NSR@84 with prolonged QT interval of 408ms. Xray thoracic spine negative for fx, showed mild DJD. C-spine xray negative for fx, DJD noted. Head CT negative for acute fracture or intracranial bleed. Abd/pelvis CT showed 2.5 uncinate pancreatic cyst, large sliding hiatal hernia, and enlarged prostate. In the ED pt received 1 L NS x1. Pt complained of abdominal pain in ED (as per pt he has chronic intermittent LLQ discomfort). GI Dr. Lainez evaluated patient. Started on Levsin for spasms. Recommended outpatient follow up of pancreatic cyst.  Cardiology, Dr. Carter, evaluated patient, tele monitor uneventful. Echocardiogram with normal LV systolic function, no significant valve issues. Neurology, Dr. Yu, evaluated the patient and was cleared from a neurological perspective Fall likely mechanical. No clear signs to suggest any new cerebrovascular accident. On day of discharge patient was stable.

## 2018-05-25 NOTE — PROGRESS NOTE ADULT - PROBLEM SELECTOR PLAN 7
- stable, chronic  - Continue xanax tid prn (hold for lethargy/sedation)  -
- stable, chronic  - Continue xanax tid prn (hold for lethargy/sedation)  -

## 2018-05-25 NOTE — PROGRESS NOTE ADULT - PROBLEM SELECTOR PLAN 5
- Mild, Na 146 in setting of slightly elevated BUN, likely 2/2 mild dehydration  - s/p 1L NS in ED  - Encouraged po intake   - F/u AM BMP
- Mild, Na 146 in setting of slightly elevated BUN, likely 2/2 mild dehydration; Cr mildly increased, encouraged po intake  - Encouraged po intake   - F/u AM BMP

## 2018-05-25 NOTE — PROGRESS NOTE ADULT - PROBLEM SELECTOR PLAN 6
- chronic, stable  - Pt BP elevated, d/c terazosin and changed to Flomax in hopes for less effect on BP
- chronic, stable  - Pt BP elevated, d/c terazosin and changed to Flomax in hopes for less effect on BP

## 2018-05-25 NOTE — DISCHARGE NOTE ADULT - MEDICATION SUMMARY - MEDICATIONS TO TAKE
I will START or STAY ON the medications listed below when I get home from the hospital:    aspirin 81 mg oral delayed release tablet  -- 1 tab(s) by mouth once a day  -- Indication: For TIA (transient ischemic attack)    Flomax 0.4 mg oral capsule  -- 1 cap(s) by mouth once a day (at bedtime)  -- Indication: For BPH (benign prostatic hyperplasia)    atorvastatin 10 mg oral tablet  -- 1 tab(s) by mouth once a day (at bedtime)  -- Indication: For TIA (transient ischemic attack)    Xanax 0.5 mg oral tablet  -- 1 tab(s) by mouth 3 times a day, As Needed for anxiety  -- Indication: For Anxiety    Levsin SL 0.125 mg sublingual tablet  -- sublingual 3 times a day, As Needed  -- Indication: For Bowel Spasms    docusate sodium 100 mg oral capsule  -- 1 cap(s) by mouth 3 times a day  -- Indication: For constipation    senna oral tablet  -- 2 tab(s) by mouth once a day (at bedtime)  -- Indication: For constipation    sucralfate 1 g oral tablet  -- 1 tab(s) by mouth 3 times a day (before meals)  -- Indication: For GERD    pantoprazole 40 mg oral delayed release tablet  -- 1 tab(s) by mouth 2 times a day (before meals)  -- Indication: For GERD    buPROPion 150 mg/12 hours (SR) oral tablet, extended release  -- 1 tab(s) by mouth 2 times a day  -- Indication: For Anxiety

## 2018-05-25 NOTE — PROGRESS NOTE ADULT - PROBLEM SELECTOR PLAN 8
- Pt has h/o TIA in 03/2018  - Continue ASA and statin
- Pt has h/o TIA in 03/2018  - Continue ASA and statin

## 2018-05-25 NOTE — DISCHARGE NOTE ADULT - PLAN OF CARE
resolution of symptoms Physical therapy evaluated you and recommended rehab. Your cardiac enzymes were negative. It is important to stay hydrated, drink plenty of fluids. Follow up with your primary care doctor, Dr. Peguero, within one week. Follow up with your neurologist, Dr. Carcamo, within one week. You were started on Colace and Senna while in the hospital. Continue bowel regimen medications. Your cat scan of the abdomen showed 2.5 uncinate pancreatic cyst, large sliding hiatal hernia, and enlarged prostate. Follow up with your outpatient gastroenterologist as needed. If you don't have one, listed below is the gastroenterologist, Dr. Leary, you saw in the hospital. You had moderate blood on your urinalysis. Follow up with your primary care doctor, Dr. Peguero, within the week for a repeat urinalysis. You were changed from Terazosin and to Flomax, which as a lessened effect on your blood pressure. Follow up with your primary care doctor within the week. continue home medication xanax as needed continue home medication, Aspirin and Atorvastatin

## 2018-05-25 NOTE — DISCHARGE NOTE ADULT - CARE PLAN
Principal Discharge DX:	Weakness of both legs  Goal:	resolution of symptoms  Assessment and plan of treatment:	Physical therapy evaluated you and recommended rehab. Your cardiac enzymes were negative. It is important to stay hydrated, drink plenty of fluids. Follow up with your primary care doctor, Dr. Peguero, within one week. Follow up with your neurologist, Dr. Carcamo, within one week.  Secondary Diagnosis:	Abdominal pain  Assessment and plan of treatment:	You were started on Colace and Senna while in the hospital. Continue bowel regimen medications. Your cat scan of the abdomen showed 2.5 uncinate pancreatic cyst, large sliding hiatal hernia, and enlarged prostate. Follow up with your outpatient gastroenterologist as needed. If you don't have one, listed below is the gastroenterologist, Dr. Leary, you saw in the hospital.  Secondary Diagnosis:	Hematuria  Assessment and plan of treatment:	You had moderate blood on your urinalysis. Follow up with your primary care doctor, Dr. Peguero, within the week for a repeat urinalysis.  Secondary Diagnosis:	BPH (benign prostatic hyperplasia)  Assessment and plan of treatment:	You were changed from Terazosin and to Flomax, which as a lessened effect on your blood pressure. Follow up with your primary care doctor within the week.  Secondary Diagnosis:	Anxiety  Assessment and plan of treatment:	continue home medication xanax as needed  Secondary Diagnosis:	TIA (transient ischemic attack)  Assessment and plan of treatment:	continue home medication, Aspirin and Atorvastatin

## 2018-05-25 NOTE — PROGRESS NOTE ADULT - PROBLEM SELECTOR PLAN 9
- DVT prophylaxis with subq heparin    IMPROVE VTE Individual Risk Assessment          RISK                                                          Points  [  ] Previous VTE                                                3  [  ] Thrombophilia                                             2  [  ] Lower limb paralysis                                   2        (unable to hold up >15 seconds)    [  ] Current Cancer                                             2         (within 6 months)  [  ] Immobilization > 24 hrs                              1  [  ] ICU/CCU stay > 24 hours                             1  [  ] Age > 60                                                         1    IMPROVE VTE Score: 1
- DVT prophylaxis with subq heparin    IMPROVE VTE Individual Risk Assessment          RISK                                                          Points  [  ] Previous VTE                                                3  [  ] Thrombophilia                                             2  [  ] Lower limb paralysis                                   2        (unable to hold up >15 seconds)    [  ] Current Cancer                                             2         (within 6 months)  [  ] Immobilization > 24 hrs                              1  [  ] ICU/CCU stay > 24 hours                             1  [  ] Age > 60                                                         1    IMPROVE VTE Score: 1

## 2018-05-25 NOTE — DISCHARGE NOTE ADULT - PATIENT PORTAL LINK FT
You can access the [facility], offered by [facility] [place], by registering with the following website: [email or web address]

## 2018-05-25 NOTE — PROGRESS NOTE ADULT - PROBLEM SELECTOR PLAN 4
- moderate blood on UA, no gross hematuria noted; per chart review pt had trace blood on UA in 03/2018 but was negative for blood prior to that  - F/u AM CBC  - Recommend pt to f/u with outpatient PCP/Uro for repeat UA
- moderate blood on UA, no gross hematuria noted; per chart review pt had trace blood on UA in 03/2018 but was negative for blood prior to that  - F/u AM CBC  - Recommend pt to f/u with outpatient PCP/Uro for repeat UA

## 2018-05-25 NOTE — DISCHARGE NOTE ADULT - CONDITIONS AT DISCHARGE
pt seen earlier and is stable from medical stand point for transfer to sub acute rehab.  pt is hard of hearing but acknowledges understanding.

## 2018-05-25 NOTE — DISCHARGE NOTE ADULT - PROVIDER TOKENS
TOKEN:'[id number]' TOKEN:'[id number]',FREE:[LAST:[Marielena],FIRST:[Madelaine],PHONE:[phone]],FAX:[phone]    -],ADDRESS:[[address] [place] [address]]],TOKEN:'[id number]'

## 2018-05-25 NOTE — DISCHARGE NOTE ADULT - CARE PROVIDERS DIRECT ADDRESSES
,DirectAddress_Unknown ,DirectAddress_Unknown,DirectAddress_Unknown,[email or web address]

## 2018-05-25 NOTE — DISCHARGE NOTE ADULT - MEDICATION SUMMARY - MEDICATIONS TO STOP TAKING
I will STOP taking the medications listed below when I get home from the hospital:    terazosin 10 mg oral tablet  --  by mouth

## 2018-05-25 NOTE — PROGRESS NOTE ADULT - PROBLEM SELECTOR PLAN 3
- Resolved now, however pt notes having h/o chronic intermittent LLQ pain  - Abd/pelvis CT showed 2.5 uncinate pancreatic cyst, large sliding hiatal hernia, and enlarged prostate  - LFTs and lipase WNL  - GI Dr. Lainez consulted, as per GI ?diverticulitis vs constipation  - Started on bowel regimen  - Bacid 1 tab daily  - Levsin 0.125 po tid for spasms  - Continue home carafate and ppi
- Resolved now, however pt notes having h/o chronic intermittent LLQ pain  - Abd/pelvis CT showed 2.5 uncinate pancreatic cyst, large sliding hiatal hernia, and enlarged prostate  - LFTs and lipase WNL  - GI Dr. Lainez consulted, as per GI ?diverticulitis vs constipation  - Started on bowel regimen  - Bacid 1 tab daily  - Levsin 0.125 po tid for spasms  - Continue home carafate and ppi

## 2018-05-25 NOTE — DISCHARGE NOTE ADULT - ADDITIONAL INSTRUCTIONS
Follow up with your primary care doctor, Dr. Peguero, within one week.  Follow up with your neurologist, Dr. Carcamo, within one week.

## 2018-05-26 VITALS
RESPIRATION RATE: 17 BRPM | DIASTOLIC BLOOD PRESSURE: 80 MMHG | TEMPERATURE: 98 F | HEART RATE: 82 BPM | SYSTOLIC BLOOD PRESSURE: 156 MMHG | OXYGEN SATURATION: 95 %

## 2018-05-26 PROCEDURE — 84100 ASSAY OF PHOSPHORUS: CPT

## 2018-05-26 PROCEDURE — 80048 BASIC METABOLIC PNL TOTAL CA: CPT

## 2018-05-26 PROCEDURE — 70450 CT HEAD/BRAIN W/O DYE: CPT

## 2018-05-26 PROCEDURE — 82553 CREATINE MB FRACTION: CPT

## 2018-05-26 PROCEDURE — 97116 GAIT TRAINING THERAPY: CPT

## 2018-05-26 PROCEDURE — 99285 EMERGENCY DEPT VISIT HI MDM: CPT | Mod: 25

## 2018-05-26 PROCEDURE — 97162 PT EVAL MOD COMPLEX 30 MIN: CPT

## 2018-05-26 PROCEDURE — 81001 URINALYSIS AUTO W/SCOPE: CPT

## 2018-05-26 PROCEDURE — 85730 THROMBOPLASTIN TIME PARTIAL: CPT

## 2018-05-26 PROCEDURE — 97530 THERAPEUTIC ACTIVITIES: CPT

## 2018-05-26 PROCEDURE — 72070 X-RAY EXAM THORAC SPINE 2VWS: CPT

## 2018-05-26 PROCEDURE — 83735 ASSAY OF MAGNESIUM: CPT

## 2018-05-26 PROCEDURE — 80053 COMPREHEN METABOLIC PANEL: CPT

## 2018-05-26 PROCEDURE — 85027 COMPLETE CBC AUTOMATED: CPT

## 2018-05-26 PROCEDURE — 93005 ELECTROCARDIOGRAM TRACING: CPT

## 2018-05-26 PROCEDURE — 85610 PROTHROMBIN TIME: CPT

## 2018-05-26 PROCEDURE — 71045 X-RAY EXAM CHEST 1 VIEW: CPT

## 2018-05-26 PROCEDURE — 72125 CT NECK SPINE W/O DYE: CPT

## 2018-05-26 PROCEDURE — 82550 ASSAY OF CK (CPK): CPT

## 2018-05-26 PROCEDURE — 74177 CT ABD & PELVIS W/CONTRAST: CPT

## 2018-05-26 PROCEDURE — 84484 ASSAY OF TROPONIN QUANT: CPT

## 2018-05-26 PROCEDURE — 83690 ASSAY OF LIPASE: CPT

## 2018-05-26 PROCEDURE — 93306 TTE W/DOPPLER COMPLETE: CPT

## 2018-05-26 PROCEDURE — 87086 URINE CULTURE/COLONY COUNT: CPT

## 2018-05-26 RX ADMIN — Medication 81 MILLIGRAM(S): at 11:47

## 2018-05-26 RX ADMIN — Medication 1 GRAM(S): at 11:47

## 2018-05-26 RX ADMIN — HEPARIN SODIUM 5000 UNIT(S): 5000 INJECTION INTRAVENOUS; SUBCUTANEOUS at 05:49

## 2018-05-26 RX ADMIN — Medication 0.5 MILLIGRAM(S): at 08:45

## 2018-05-26 RX ADMIN — BUPROPION HYDROCHLORIDE 300 MILLIGRAM(S): 150 TABLET, EXTENDED RELEASE ORAL at 11:47

## 2018-05-26 RX ADMIN — Medication 1 TABLET(S): at 08:43

## 2018-05-26 RX ADMIN — Medication 1 TABLET(S): at 11:48

## 2018-05-26 RX ADMIN — FAMOTIDINE 20 MILLIGRAM(S): 10 INJECTION INTRAVENOUS at 11:47

## 2018-05-26 RX ADMIN — Medication 1 GRAM(S): at 05:49

## 2018-05-26 NOTE — PROGRESS NOTE ADULT - PROBLEM SELECTOR PLAN 2
- Remote tele to r/o arrhythmia  - Cardio Dr. Carter consulted, recommendations appreciated  - Neuro Dr. Yu consulted, recommendations appreciated  - CE x3 negative  - F/u orthostatics to r/o orthostatic hypotension  - F/u Echo
2.5 cm cyst in uncinate seen on CT  stable  outpatient GI f/u for surveillance
2.5 cm cyst in uncinate seen on CT  stable  outpatient f/u as needed
2.5 cm cyst in uncinate seen on CT  stable  outpatient f/u as needed
- Remote tele to r/o arrhythmia  - Cardio Dr. Carter consulted, recommendations appreciated  - Neuro Dr. Yu consulted, recommendations appreciated  - CE x3 negative  - Orthostatics negative  - Echo showed EF 65%, trace TR, trace MR, mild AI, LVH, normal systolic function, grade I diastolic dysfunction]

## 2018-05-26 NOTE — PROGRESS NOTE ADULT - SUBJECTIVE AND OBJECTIVE BOX
Chief Complaint: Fall    Interval Events: No events overnight. Some nausea.    Review of Systems:  General: No fevers, chills, weight loss or gain  Skin: No rashes, color changes  Cardiovascular: No chest pain, orthopnea  Respiratory: No shortness of breath, cough  Gastrointestinal: No nausea, abdominal pain  Genitourinary: No incontinence, pain with urination  Musculoskeletal: No pain, swelling, decreased range of motion  Neurological: No headache, weakness  Psychiatric: No depression, anxiety  Endocrine: No weight loss or gain, increased thirst  All other systems are comprehensively negative.    Physical Exam:  Vital Signs Last 24 Hrs  T(C): 36.8 (26 May 2018 06:49), Max: 36.8 (26 May 2018 06:49)  T(F): 98.2 (26 May 2018 06:49), Max: 98.2 (26 May 2018 06:49)  HR: 82 (26 May 2018 06:49) (81 - 90)  BP: 156/80 (26 May 2018 06:49) (131/80 - 160/98)  BP(mean): --  RR: 17 (26 May 2018 06:49) (17 - 18)  SpO2: 95% (26 May 2018 06:49) (95% - 98%)  General: NAD  HEENT: MMM  Neck: No JVD, no carotid bruit  Lungs: CTAB  CV: RRR, nl S1/S2, no M/R/G  Abdomen: S/NT/ND, +BS  Extremities: No LE edema, no cyanosis  Neuro: AAOx3, non-focal  Skin: No rash    Labs:             05-25    146<H>  |  112<H>  |  23  ----------------------------<  93  3.9   |  25  |  1.50<H>    Ca    8.8      25 May 2018 07:30                          12.9   5.04  )-----------( 177      ( 25 May 2018 07:30 )             38.2
Chief Complaint: Fall    Interval Events: No events overnight. No complaints.    Review of Systems:  General: No fevers, chills, weight loss or gain  Skin: No rashes, color changes  Cardiovascular: No chest pain, orthopnea  Respiratory: No shortness of breath, cough  Gastrointestinal: No nausea, abdominal pain  Genitourinary: No incontinence, pain with urination  Musculoskeletal: No pain, swelling, decreased range of motion  Neurological: No headache, weakness  Psychiatric: No depression, anxiety  Endocrine: No weight loss or gain, increased thirst  All other systems are comprehensively negative.    Physical Exam:  Vitals:        Vital Signs Last 24 Hrs  T(C): 36.8 (25 May 2018 04:56), Max: 36.8 (25 May 2018 04:56)  T(F): 98.2 (25 May 2018 04:56), Max: 98.2 (25 May 2018 04:56)  HR: 74 (25 May 2018 04:56) (67 - 74)  BP: 122/71 (25 May 2018 04:56) (122/71 - 174/88)  BP(mean): --  RR: 16 (25 May 2018 04:56) (16 - 17)  SpO2: 94% (25 May 2018 04:56) (94% - 97%)  General: NAD  HEENT: MMM  Neck: No JVD, no carotid bruit  Lungs: CTAB  CV: RRR, nl S1/S2, no M/R/G  Abdomen: S/NT/ND, +BS  Extremities: No LE edema, no cyanosis  Neuro: AAOx3, non-focal  Skin: No rash    Labs:                        12.9   5.04  )-----------( 177      ( 25 May 2018 07:30 )             38.2     05-25    146<H>  |  112<H>  |  23  ----------------------------<  93  3.9   |  25  |  1.50<H>    Ca    8.8      25 May 2018 07:30  Phos  3.0     05-24  Mg     2.2     05-24    TPro  6.4  /  Alb  3.3  /  TBili  0.5  /  DBili  x   /  AST  28  /  ALT  33  /  AlkPhos  71  05-23    CARDIAC MARKERS ( 24 May 2018 01:19 )  <.015 ng/mL / x     / 106 U/L / x     / 1.9 ng/mL  CARDIAC MARKERS ( 23 May 2018 19:21 )  <.015 ng/mL / x     / 121 U/L / x     / 2.6 ng/mL  CARDIAC MARKERS ( 23 May 2018 13:38 )  <.015 ng/mL / x     / 104 U/L / x     / 2.4 ng/mL      PT/INR - ( 23 May 2018 13:38 )   PT: 12.3 sec;   INR: 1.13 ratio         PTT - ( 23 May 2018 13:38 )  PTT:32.9 sec    Telemetry: Sinus rhythm
HPI:  89 y/o M with PMH of anxiety, BPH, TIA (03/2018), h/o diverticulitis and duodenal ulcer (03/2018), [facility] after falling down stairs and unable to get up, admitted for fall - mechanical fall vs syncope.    No acute overnight events. Pt seen and examined at bedside. States he is willing to go to [facility] to get stronger and more confident on his feet. Pt has no complaints at this time, states that the generalized weakness is improving .    REVIEW OF SYSTEMS:    CONSTITUTIONAL: Generalized weakness (improving); No fevers or chills  EYES/ENT: No visual changes, no throat pain   RESPIRATORY: No cough, wheezing; No shortness of breath  CARDIOVASCULAR: No chest pain or palpitations  GASTROINTESTINAL: No abdominal pain, nausea, vomiting; No diarrhea or constipation. No melena or hematochezia.  GENITOURINARY: No dysuria, frequency or hematuria  NEUROLOGICAL: No dizziness, numbness, or weakness  SKIN: No itching, burning, rashes, or lesions   All other review of systems is negative unless indicated above.    VITAL SIGNS:  Vital Signs Last 24 Hrs  T(C): 36.8 (25 May 2018 04:56), Max: 36.8 (25 May 2018 04:56)  T(F): 98.2 (25 May 2018 04:56), Max: 98.2 (25 May 2018 04:56)  HR: 74 (25 May 2018 04:56) (67 - 74)  BP: 122/71 (25 May 2018 04:56) (122/71 - 174/88)  BP(mean): --  RR: 16 (25 May 2018 04:56) (16 - 17)  SpO2: 94% (25 May 2018 04:56) (94% - 97%)      PHYSICAL EXAM:     General: Well developed, well nourished, NAD, elderly  HEENT: NCAT, PERRLA, EOMI b/ll, moist mucous membranes   Neck: Supple, nontender  Neurology: A&Ox3, nonfocal, intention tremor noted in b/l hands, pt able to move all 4 extremities  Respiratory: CTA B/L, No W/R/R  	CV: RRR, +S1/S2, no murmurs, rubs or gallops  	Abdominal: Soft, NT, ND +BSx4  	Extremities: No LE edema, + peripheral pulses  	MSK: no joint erythema or warmth, no joint swelling   Skin: warm, dry, normal color, no rash; several old scabs on b/l shins, small abrasion noted on R elbow                          12.9   5.04  )-----------( 177      ( 25 May 2018 07:30 )             38.2     05-25    146<H>  |  112<H>  |  23  ----------------------------<  93  3.9   |  25  |  1.50<H>    Ca    8.8      25 May 2018 07:30  Phos  3.0     05-24  Mg     2.2     05-24    TPro  6.4  /  Alb  3.3  /  TBili  0.5  /  DBili  x   /  AST  28  /  ALT  33  /  AlkPhos  71  05-23      MEDICATIONS  (STANDING):  aspirin enteric coated 81 milliGRAM(s) Oral daily  atorvastatin 10 milliGRAM(s) Oral at bedtime  buPROPion XL . 300 milliGRAM(s) Oral daily  docusate sodium 100 milliGRAM(s) Oral three times a day  famotidine    Tablet 20 milliGRAM(s) Oral daily  heparin  Injectable 5000 Unit(s) SubCutaneous every 8 hours  lactobacillus acidophilus 1 Tablet(s) Oral four times a day with meals  senna 2 Tablet(s) Oral at bedtime  sucralfate 1 Gram(s) Oral <User Schedule>  tamsulosin 0.4 milliGRAM(s) Oral at bedtime
INTERVAL HPI/OVERNIGHT EVENTS:  pt seen and examined  c/o mild llq pain, dneies n/v/d  states last bm was yesterday morning  afebrile overnight    MEDICATIONS  (STANDING):  aspirin enteric coated 81 milliGRAM(s) Oral daily  atorvastatin 10 milliGRAM(s) Oral at bedtime  buPROPion XL . 300 milliGRAM(s) Oral daily  docusate sodium 100 milliGRAM(s) Oral three times a day  famotidine    Tablet 20 milliGRAM(s) Oral daily  heparin  Injectable 5000 Unit(s) SubCutaneous every 8 hours  lactobacillus acidophilus 1 Tablet(s) Oral four times a day with meals  senna 2 Tablet(s) Oral at bedtime  sucralfate 1 Gram(s) Oral <User Schedule>  tamsulosin 0.4 milliGRAM(s) Oral at bedtime    MEDICATIONS  (PRN):  ALPRAZolam 0.5 milliGRAM(s) Oral three times a day PRN anxiety  hyoscyamine SL 0.125 milliGRAM(s) SubLingual three times a day PRN Infant Colic Symptoms      Allergies    No Known Allergies    Intolerances        Review of Systems:    General:  No wt loss, fevers, chills, night sweats, fatigue   Eyes:  Good vision, no reported pain  ENT:  No sore throat, pain, runny nose, dysphagia  CV:  No pain, palpitations, hypo/hypertension  Resp:  No dyspnea, cough, tachypnea, wheezing  GI:  mild llq pain, No nausea, No vomiting, No diarrhea, No constipation, No weight loss, No fever, No pruritis, No rectal bleeding, No melena, No dysphagia  :  No pain, bleeding, incontinence, nocturia  Muscle:  No pain, weakness  Neuro:  No weakness, tingling, memory problems  Psych:  No fatigue, insomnia, mood problems, depression  Endocrine:  No polyuria, polydypsia, cold/heat intolerance  Heme:  No petechiae, ecchymosis, easy bruisability  Skin:  No rash, tattoos, scars, edema      Vital Signs Last 24 Hrs  T(C): 36.8 (25 May 2018 04:56), Max: 36.8 (25 May 2018 04:56)  T(F): 98.2 (25 May 2018 04:56), Max: 98.2 (25 May 2018 04:56)  HR: 74 (25 May 2018 04:56) (67 - 74)  BP: 122/71 (25 May 2018 04:56) (122/71 - 174/88)  BP(mean): --  RR: 16 (25 May 2018 04:56) (16 - 17)  SpO2: 94% (25 May 2018 04:56) (94% - 97%)    PHYSICAL EXAM:  Constitutional: NAD, lying in bed  HEENT: EOMI, perrl  Neck: No LAD  Respiratory: CTA  Cardiovascular: S1 and S2, RRR  Gastrointestinal: BS+, soft, NT, no rt, no guarding, ND  Extremities: No peripheral edema  Vascular: 2+ peripheral pulses  Neurological: Awake alert responds appropriately ?mild confusion  Skin: No rashes    LABS:                        12.9   5.04  )-----------( 177      ( 25 May 2018 07:30 )             38.2     05-25    146<H>  |  112<H>  |  23  ----------------------------<  93  3.9   |  25  |  1.50<H>    Ca    8.8      25 May 2018 07:30  Phos  3.0     05-24  Mg     2.2     05-24    TPro  6.4  /  Alb  3.3  /  TBili  0.5  /  DBili  x   /  AST  28  /  ALT  33  /  AlkPhos  71  05-23    PT/INR - ( 23 May 2018 13:38 )   PT: 12.3 sec;   INR: 1.13 ratio         PTT - ( 23 May 2018 13:38 )  PTT:32.9 sec  Urinalysis Basic - ( 23 May 2018 13:55 )    Color: Pale Yellow / Appearance: Clear / S.010 / pH: x  Gluc: x / Ketone: Negative  / Bili: Negative / Urobili: Negative   Blood: x / Protein: Negative / Nitrite: Negative   Leuk Esterase: Negative / RBC: 11-25 /HPF / WBC Negative   Sq Epi: x / Non Sq Epi: Negative / Bacteria: Negative        RADIOLOGY & ADDITIONAL TESTS:
INTERVAL HPI/OVERNIGHT EVENTS:  pt seen and examined  denies n/v/abd pain  per rn no acute gi issues overnight, no bm  afebrile, labs pending    MEDICATIONS  (STANDING):  aspirin enteric coated 81 milliGRAM(s) Oral daily  atorvastatin 10 milliGRAM(s) Oral at bedtime  buPROPion XL . 300 milliGRAM(s) Oral daily  docusate sodium 100 milliGRAM(s) Oral three times a day  doxazosin 8 milliGRAM(s) Oral at bedtime  famotidine    Tablet 20 milliGRAM(s) Oral daily  heparin  Injectable 5000 Unit(s) SubCutaneous every 8 hours  lactobacillus acidophilus 1 Tablet(s) Oral four times a day with meals  pantoprazole    Tablet 40 milliGRAM(s) Oral before breakfast  senna 2 Tablet(s) Oral at bedtime  sucralfate 1 Gram(s) Oral <User Schedule>    MEDICATIONS  (PRN):  ALPRAZolam 0.5 milliGRAM(s) Oral three times a day PRN anxiety  hyoscyamine SL 0.125 milliGRAM(s) SubLingual three times a day PRN Infant Colic Symptoms      Allergies    No Known Allergies    Intolerances        Review of Systems:    General:  No wt loss, fevers, chills, night sweats, fatigue   Eyes:  Good vision, no reported pain  ENT:  No sore throat, pain, runny nose, dysphagia  CV:  No pain, palpitations, hypo/hypertension  Resp:  No dyspnea, cough, tachypnea, wheezing  GI:  No pain, No nausea, No vomiting, No diarrhea, No constipation, No weight loss, No fever, No pruritis, No rectal bleeding, No melena, No dysphagia  :  No pain, bleeding, incontinence, nocturia  Muscle:  No pain, weakness  Neuro:  No weakness, tingling, memory problems  Psych:  No fatigue, insomnia, mood problems, depression  Endocrine:  No polyuria, polydypsia, cold/heat intolerance  Heme:  No petechiae, ecchymosis, easy bruisability  Skin:  No rash, tattoos, scars, edema      Vital Signs Last 24 Hrs  T(C): 36.4 (24 May 2018 05:37), Max: 36.8 (23 May 2018 12:25)  T(F): 97.5 (24 May 2018 05:37), Max: 98.3 (23 May 2018 12:25)  HR: 80 (24 May 2018 05:37) (72 - 96)  BP: 166/81 (24 May 2018 05:37) (153/89 - 184/91)  BP(mean): --  RR: 16 (24 May 2018 05:37) (16 - 19)  SpO2: 94% (24 May 2018 05:37) (94% - 99%)    PHYSICAL EXAM:    Constitutional: NAD, lying in bed  HEENT: EOMI, perrl  Neck: No LAD  Respiratory: CTA  Cardiovascular: S1 and S2, RRR  Gastrointestinal: BS+, soft, NT/ND  Extremities: No peripheral edema  Vascular: 2+ peripheral pulses  Neurological: Awake alert responds appropriately ?mild confusion  Skin: No rashes      LABS:                        12.5   4.37  )-----------( 173      ( 24 May 2018 07:26 )             35.9     05-23    146<H>  |  112<H>  |  25<H>  ----------------------------<  82  4.4   |  26  |  1.30    Ca    8.8      23 May 2018 13:38    TPro  6.4  /  Alb  3.3  /  TBili  0.5  /  DBili  x   /  AST  28  /  ALT  33  /  AlkPhos  71  05-23    PT/INR - ( 23 May 2018 13:38 )   PT: 12.3 sec;   INR: 1.13 ratio         PTT - ( 23 May 2018 13:38 )  PTT:32.9 sec  Urinalysis Basic - ( 23 May 2018 13:55 )    Color: Pale Yellow / Appearance: Clear / S.010 / pH: x  Gluc: x / Ketone: Negative  / Bili: Negative / Urobili: Negative   Blood: x / Protein: Negative / Nitrite: Negative   Leuk Esterase: Negative / RBC: 11-25 /HPF / WBC Negative   Sq Epi: x / Non Sq Epi: Negative / Bacteria: Negative        RADIOLOGY & ADDITIONAL TESTS:
INTERVAL HPI/OVERNIGHT EVENTS:  pt seen and examined  denies n/v/d, c/o mild llq  +bm yesterday  afebrile overnight no new labs  for possible dc today    MEDICATIONS  (STANDING):  aspirin enteric coated 81 milliGRAM(s) Oral daily  atorvastatin 10 milliGRAM(s) Oral at bedtime  buPROPion XL . 300 milliGRAM(s) Oral daily  docusate sodium 100 milliGRAM(s) Oral three times a day  famotidine    Tablet 20 milliGRAM(s) Oral daily  heparin  Injectable 5000 Unit(s) SubCutaneous every 8 hours  lactobacillus acidophilus 1 Tablet(s) Oral four times a day with meals  senna 2 Tablet(s) Oral at bedtime  sucralfate 1 Gram(s) Oral <User Schedule>  tamsulosin 0.4 milliGRAM(s) Oral at bedtime    MEDICATIONS  (PRN):  ALPRAZolam 0.5 milliGRAM(s) Oral three times a day PRN anxiety  hyoscyamine SL 0.125 milliGRAM(s) SubLingual three times a day PRN Infant Colic Symptoms      Allergies    No Known Allergies    Intolerances        Review of Systems:    General:  No wt loss, fevers, chills, night sweats, fatigue   Eyes:  Good vision, no reported pain  ENT:  No sore throat, pain, runny nose, dysphagia  CV:  No pain, palpitations, hypo/hypertension  Resp:  No dyspnea, cough, tachypnea, wheezing  GI:  +mild llq pain, No nausea, No vomiting, No diarrhea, No constipation, No weight loss, No fever, No pruritis, No rectal bleeding, No melena, No dysphagia  :  No pain, bleeding, incontinence, nocturia  Muscle:  No pain, weakness  Neuro:  No weakness, tingling, memory problems  Psych:  No fatigue, insomnia, mood problems, depression  Endocrine:  No polyuria, polydypsia, cold/heat intolerance  Heme:  No petechiae, ecchymosis, easy bruisability  Skin:  No rash, tattoos, scars, edema      Vital Signs Last 24 Hrs  T(C): 36.8 (26 May 2018 06:49), Max: 36.8 (26 May 2018 06:49)  T(F): 98.2 (26 May 2018 06:49), Max: 98.2 (26 May 2018 06:49)  HR: 82 (26 May 2018 06:49) (81 - 90)  BP: 156/80 (26 May 2018 06:49) (131/80 - 160/98)  BP(mean): --  RR: 17 (26 May 2018 06:49) (17 - 18)  SpO2: 95% (26 May 2018 06:49) (95% - 98%)    PHYSICAL EXAM:    Constitutional: NAD, lying in bed  HEENT: EOMI, perrl  Neck: No LAD  Respiratory: CTA  Cardiovascular: S1 and S2, RRR  Gastrointestinal: BS+, soft, Non tender to deep palpation, no rt, no guarding, ND  Extremities: No peripheral edema  Vascular: 2+ peripheral pulses  Neurological: Awake alert responds appropriately ?mild confusion  Skin: No rashes        LABS:                        12.9   5.04  )-----------( 177      ( 25 May 2018 07:30 )             38.2     05-25    146<H>  |  112<H>  |  23  ----------------------------<  93  3.9   |  25  |  1.50<H>    Ca    8.8      25 May 2018 07:30            RADIOLOGY & ADDITIONAL TESTS:
Neurology follow up note    ANI [id number]      Interval History:    Patient feels ok no new complaints.    MEDICATIONS    ALPRAZolam 0.5 milliGRAM(s) Oral three times a day PRN  aspirin enteric coated 81 milliGRAM(s) Oral daily  atorvastatin 10 milliGRAM(s) Oral at bedtime  buPROPion XL . 300 milliGRAM(s) Oral daily  docusate sodium 100 milliGRAM(s) Oral three times a day  famotidine    Tablet 20 milliGRAM(s) Oral daily  heparin  Injectable 5000 Unit(s) SubCutaneous every 8 hours  hyoscyamine SL 0.125 milliGRAM(s) SubLingual three times a day PRN  lactobacillus acidophilus 1 Tablet(s) Oral four times a day with meals  senna 2 Tablet(s) Oral at bedtime  sucralfate 1 Gram(s) Oral <User Schedule>  tamsulosin 0.4 milliGRAM(s) Oral at bedtime      Allergies    No Known Allergies    Intolerances            Vital Signs Last 24 Hrs  T(C): 36.6 (25 May 2018 14:05), Max: 36.8 (25 May 2018 04:56)  T(F): 97.8 (25 May 2018 14:05), Max: 98.2 (25 May 2018 04:56)  HR: 90 (25 May 2018 14:05) (67 - 90)  BP: 131/80 (25 May 2018 14:05) (122/71 - 174/88)  BP(mean): --  RR: 18 (25 May 2018 14:05) (16 - 18)  SpO2: 97% (25 May 2018 14:05) (94% - 97%)    REVIEW OF SYSTEMS:  Limited or unable to obtain secondary to patient's poor mental status.    Constitutional: No fever, chills, fatigue, weakness  Eyes: no eye pain, visual disturbances, or discharge  ENT:  No difficulty hearing, tinnitus, vertigo; No sinus or throat pain  Neck: No pain or stiffness  Respiratory: No cough, dyspnea, wheezing   Cardiovascular: No chest pain, palpitations,   Gastrointestinal: No abdominal or epigastric pain. No nausea, vomiting  No diarrhea or constipation.   Genitourinary: No dysuria, frequency, hematuria or incontinence  Neurological: No headaches, lightheadedness, vertigo, numbness or tremors  Psychiatric: No depression, anxiety, mood swings or difficulty sleeping  Musculoskeletal: No joint pain or swelling; No muscle, back or extremity pain  Skin: No itching, burning, rashes or lesions   Lymph Nodes: No enlarged glands  Endocrine: No heat or cold intolerance; No hair loss   Allergy and Immunologic: No hives or eczema    On Neurological Examination:  The patient is awake, alert.      Extraocular movements were intact.      Positive dementia.      Speech was fluent.      Smile was symmetric.      Motor, bilateral upper and lower 4/5.      Sensory, bilateral upper and lower intact to light touch.      No drift, finger-to-nose within normal limits.    Follow simple commands    GENERAL Exam: Nontoxic , No Acute Distress   	  HEENT:  normocephalic, atraumatic [place]  		  LUNGS: Clear bilaterally    	  HEART: Normal S1S2   No murmur RRR        	  GI/ ABDOMEN:  Soft  Non tender    EXTREMITIES:   No Edema  No Clubbing  No Cyanosis No Edema    MUSCULOSKELETAL:  decreased Range of Motion all 4 extremities   	   SKIN: Normal  No Ecchymosis               LABS:  CBC Full  -  ( 25 May 2018 07:30 )  WBC Count : 5.04 K/uL  Hemoglobin : 12.9 g/dL  Hematocrit : 38.2 %  Platelet Count - Automated : 177 K/uL  Mean Cell Volume : 89.3 fl  Mean Cell Hemoglobin : 30.1 pg  Mean Cell Hemoglobin Concentration : 33.8 gm/dL  Auto Neutrophil # : x  Auto Lymphocyte # : x  Auto Monocyte # : x  Auto Eosinophil # : x  Auto Basophil # : x  Auto Neutrophil % : x  Auto Lymphocyte % : x  Auto Monocyte % : x  Auto Eosinophil % : x  Auto Basophil % : x      05-25    146<H>  |  112<H>  |  23  ----------------------------<  93  3.9   |  25  |  1.50<H>    Ca    8.8      25 May 2018 07:30  Phos  3.0     05-24  Mg     2.2     05-24      Hemoglobin A1C:       Vitamin B12         RADIOLOGY      ANALYSIS AND PLAN:  This is an 88-year-old with frequent falls, tremors and forgetfulness.  1.	For frequent falls, this appear what could be described as mechanical fall, the last of that unknown questionable syncope.  The patient's neurologic examination is completely nonfocal.  No clear signs to suggest any new cerebrovascular accident.  2.	Recommend fall precaution.  3.	For history of tremors, questionable development of slight Parkinson's versus possibly underlying age-related tremors.  4.	For history of dementia and mild cognitive impairment, the patient will followup with his outside neurologist.  5.	Spoke with the patient's spouse in great detail, they understand the thought process. yesterday today no response  5/25/18    Neurologic standpoint only cleared for discharge planning     Physical therapy evaluation as tolerated  OOB to chair/ambulation with assistance only if possible.    Greater than 40 minutes spent in direct patient care reviewing  the notes, lab data/ imaging , discussion with multidisciplinary team.
Neurology follow up note    ANI [id number]      Interval History:    Patient feels ok no new complaints.    MEDICATIONS    ALPRAZolam 0.5 milliGRAM(s) Oral three times a day PRN  aspirin enteric coated 81 milliGRAM(s) Oral daily  atorvastatin 10 milliGRAM(s) Oral at bedtime  buPROPion XL . 300 milliGRAM(s) Oral daily  docusate sodium 100 milliGRAM(s) Oral three times a day  famotidine    Tablet 20 milliGRAM(s) Oral daily  heparin  Injectable 5000 Unit(s) SubCutaneous every 8 hours  hyoscyamine SL 0.125 milliGRAM(s) SubLingual three times a day PRN  lactobacillus acidophilus 1 Tablet(s) Oral four times a day with meals  senna 2 Tablet(s) Oral at bedtime  sucralfate 1 Gram(s) Oral <User Schedule>  tamsulosin 0.4 milliGRAM(s) Oral at bedtime      Allergies    No Known Allergies    Intolerances          Weight (kg): 72.2 ( @ 22:59)    Vital Signs Last 24 Hrs  T(C): 36.4 (24 May 2018 05:37), Max: 36.7 (23 May 2018 19:27)  T(F): 97.5 (24 May 2018 05:37), Max: 98 (23 May 2018 19:27)  HR: 88 (24 May 2018 08:11) (72 - 96)  BP: 166/81 (24 May 2018 05:37) (153/89 - 184/91)  BP(mean): --  RR: 16 (24 May 2018 05:37) (16 - 19)  SpO2: 94% (24 May 2018 05:37) (94% - 99%)      REVIEW OF SYSTEMS:  Limited or unable to obtain secondary to patient's poor mental status.    Constitutional: No fever, chills, fatigue, weakness  Eyes: no eye pain, visual disturbances, or discharge  ENT:  No difficulty hearing, tinnitus, vertigo; No sinus or throat pain  Neck: No pain or stiffness  Respiratory: No cough, dyspnea, wheezing   Cardiovascular: No chest pain, palpitations,   Gastrointestinal: No abdominal or epigastric pain. No nausea, vomiting  No diarrhea or constipation.   Genitourinary: No dysuria, frequency, hematuria or incontinence  Neurological: No headaches, lightheadedness, vertigo, numbness or tremors  Psychiatric: No depression, anxiety, mood swings or difficulty sleeping  Musculoskeletal: No joint pain or swelling; No muscle, back or extremity pain  Skin: No itching, burning, rashes or lesions   Lymph Nodes: No enlarged glands  Endocrine: No heat or cold intolerance; No hair loss   Allergy and Immunologic: No hives or eczema    On Neurological Examination:  The patient is awake, alert.      Extraocular movements were intact.      Positive dementia.      Speech was fluent.      Smile was symmetric.      Motor, bilateral upper and lower 4/5.      Sensory, bilateral upper and lower intact to light touch.      No drift, finger-to-nose within normal limits.          Follow simple commands  Follow complex commands  Does not follow commands          GENERAL Exam: Nontoxic , No Acute Distress   	  HEENT:  normocephalic, atraumatic [place]  		  LUNGS: Clear bilaterally    	  HEART: Normal S1S2   No murmur RRR        	  GI/ ABDOMEN:  Soft  Non tender    EXTREMITIES:   No Edema  No Clubbing  No Cyanosis No Edema    MUSCULOSKELETAL:  decreased Range of Motion all 4 extremities   	   SKIN: Normal  No Ecchymosis               LABS:  CBC Full  -  ( 24 May 2018 07:26 )  WBC Count : 4.37 K/uL  Hemoglobin : 12.5 g/dL  Hematocrit : 35.9 %  Platelet Count - Automated : 173 K/uL  Mean Cell Volume : 89.5 fl  Mean Cell Hemoglobin : 31.2 pg  Mean Cell Hemoglobin Concentration : 34.8 gm/dL  Auto Neutrophil # : 2.46 K/uL  Auto Lymphocyte # : 0.79 K/uL  Auto Monocyte # : 0.57 K/uL  Auto Eosinophil # : 0.49 K/uL  Auto Basophil # : 0.04 K/uL  Auto Neutrophil % : 56.3 %  Auto Lymphocyte % : 18.1 %  Auto Monocyte % : 13.0 %  Auto Eosinophil % : 11.2 %  Auto Basophil % : 0.9 %    Urinalysis Basic - ( 23 May 2018 13:55 )    Color: Pale Yellow / Appearance: Clear / S.010 / pH: x  Gluc: x / Ketone: Negative  / Bili: Negative / Urobili: Negative   Blood: x / Protein: Negative / Nitrite: Negative   Leuk Esterase: Negative / RBC: 11-25 /HPF / WBC Negative   Sq Epi: x / Non Sq Epi: Negative / Bacteria: Negative      -    146<H>  |  111<H>  |  18  ----------------------------<  84  3.5   |  26  |  1.30    Ca    8.6      24 May 2018 07:26  Phos  3.0     05-24  Mg     2.2     05-24    TPro  6.4  /  Alb  3.3  /  TBili  0.5  /  DBili  x   /  AST  28  /  ALT  33  /  AlkPhos  71  05-23    Hemoglobin A1C:     LIVER FUNCTIONS - ( 23 May 2018 13:38 )  Alb: 3.3 g/dL / Pro: 6.4 g/dL / ALK PHOS: 71 U/L / ALT: 33 U/L / AST: 28 U/L / GGT: x           Vitamin B12   PT/INR - ( 23 May 2018 13:38 )   PT: 12.3 sec;   INR: 1.13 ratio         PTT - ( 23 May 2018 13:38 )  PTT:32.9 sec      RADIOLOGY    ANALYSIS AND PLAN:  This is an 88-year-old with frequent falls, tremors and forgetfulness.  1.	For frequent falls, this appear what could be described as mechanical fall, the last of that unknown questionable syncope.  The patient's neurologic examination is completely nonfocal.  No clear signs to suggest any new cerebrovascular accident.  2.	Recommend fall precaution.  3.	For history of tremors, questionable development of slight Parkinson's versus possibly underlying age-related tremors.  4.	For history of dementia and mild cognitive impairment, the patient will followup with his outside neurologist.  5.	Spoke with the patient's spouse in great detail, they understand the thought process. yesterday today no response         Physical therapy evaluation as tolerated  OOB to chair/ambulation with assistance only if possible.    Greater than 45 minutes spent in direct patient care reviewing  the notes, lab data/ imaging , discussion with multidisciplinary team.
Neurology follow up note    ANI [id number]      Interval History:    Patient feels ok no new complaints.    MEDICATIONS    ALPRAZolam 0.5 milliGRAM(s) Oral three times a day PRN  aspirin enteric coated 81 milliGRAM(s) Oral daily  atorvastatin 10 milliGRAM(s) Oral at bedtime  buPROPion XL . 300 milliGRAM(s) Oral daily  docusate sodium 100 milliGRAM(s) Oral three times a day  famotidine    Tablet 20 milliGRAM(s) Oral daily  heparin  Injectable 5000 Unit(s) SubCutaneous every 8 hours  hyoscyamine SL 0.125 milliGRAM(s) SubLingual three times a day PRN  lactobacillus acidophilus 1 Tablet(s) Oral four times a day with meals  senna 2 Tablet(s) Oral at bedtime  sucralfate 1 Gram(s) Oral <User Schedule>  tamsulosin 0.4 milliGRAM(s) Oral at bedtime      Allergies    No Known Allergies    Intolerances            Vital Signs Last 24 Hrs  T(C): 36.8 (26 May 2018 06:49), Max: 36.8 (26 May 2018 06:49)  T(F): 98.2 (26 May 2018 06:49), Max: 98.2 (26 May 2018 06:49)  HR: 82 (26 May 2018 06:49) (81 - 90)  BP: 156/80 (26 May 2018 06:49) (131/80 - 160/98)  BP(mean): --  RR: 17 (26 May 2018 06:49) (17 - 18)  SpO2: 95% (26 May 2018 06:49) (95% - 98%)    REVIEW OF SYSTEMS:  Limited or unable to obtain secondary to patient's poor mental status.    Constitutional: No fever, chills, fatigue, weakness  Eyes: no eye pain, visual disturbances, or discharge  ENT:  No difficulty hearing, tinnitus, vertigo; No sinus or throat pain  Neck: No pain or stiffness  Respiratory: No cough, dyspnea, wheezing   Cardiovascular: No chest pain, palpitations,   Gastrointestinal: No abdominal or epigastric pain. No nausea, vomiting  No diarrhea or constipation.   Genitourinary: No dysuria, frequency, hematuria or incontinence  Neurological: No headaches, lightheadedness, vertigo, numbness or tremors  Psychiatric: No depression, anxiety, mood swings or difficulty sleeping  Musculoskeletal: No joint pain or swelling; No muscle, back or extremity pain  Skin: No itching, burning, rashes or lesions   Lymph Nodes: No enlarged glands  Endocrine: No heat or cold intolerance; No hair loss   Allergy and Immunologic: No hives or eczema    On Neurological Examination:  The patient is awake, alert.      Extraocular movements were intact.      Positive dementia.      Speech was fluent.      Smile was symmetric.      Motor, bilateral upper and lower 4/5.      Sensory, bilateral upper and lower intact to light touch.      No drift, finger-to-nose within normal limits.    Follow simple commands    GENERAL Exam: Nontoxic , No Acute Distress   	  HEENT:  normocephalic, atraumatic [place]  		  LUNGS: Clear bilaterally    	  HEART: Normal S1S2   No murmur RRR        	  GI/ ABDOMEN:  Soft  Non tender    EXTREMITIES:   No Edema  No Clubbing  No Cyanosis No Edema    MUSCULOSKELETAL:  decreased Range of Motion all 4 extremities   	   SKIN: Normal  No Ecchymosis               LABS:  CBC Full  -  ( 25 May 2018 07:30 )  WBC Count : 5.04 K/uL  Hemoglobin : 12.9 g/dL  Hematocrit : 38.2 %  Platelet Count - Automated : 177 K/uL  Mean Cell Volume : 89.3 fl  Mean Cell Hemoglobin : 30.1 pg  Mean Cell Hemoglobin Concentration : 33.8 gm/dL  Auto Neutrophil # : x  Auto Lymphocyte # : x  Auto Monocyte # : x  Auto Eosinophil # : x  Auto Basophil # : x  Auto Neutrophil % : x  Auto Lymphocyte % : x  Auto Monocyte % : x  Auto Eosinophil % : x  Auto Basophil % : x      05-25    146<H>  |  112<H>  |  23  ----------------------------<  93  3.9   |  25  |  1.50<H>    Ca    8.8      25 May 2018 07:30      Hemoglobin A1C:       Vitamin B12         RADIOLOGY      ANALYSIS AND PLAN:  This is an 88-year-old with frequent falls, tremors and forgetfulness.  1.	For frequent falls, this appear what could be described as mechanical fall, the last of that unknown questionable syncope.  The patient's neurologic examination is completely nonfocal.  No clear signs to suggest any new cerebrovascular accident.  2.	Recommend fall precaution.  3.	For history of tremors, questionable development of slight Parkinson's versus possibly underlying age-related tremors.  4.	For history of dementia and mild cognitive impairment, the patient will followup with his outside neurologist.  5.	Spoke with the patient's spouse in great detail, they understand the thought process. yesterday  5/25/18  6.	overall appears better     Neurologic standpoint only cleared for discharge planning     Physical therapy evaluation as tolerated  OOB to chair/ambulation with assistance only if possible.    Greater than 40 minutes spent in direct patient care reviewing  the notes, lab data/ imaging , discussion with multidisciplinary team.
HPI:  87 y/o M with PMH of anxiety, BPH, TIA (03/2018), h/o diverticulitis and duodenal ulcer (03/2018), [facility] after falling down stairs and unable to get up, admitted for fall - mechanical fall vs syncope.    No acute overnight events. Pt seen and examined at bedside. States he wants to go home. Pt has no complaints at this time, aside from generalized weakness.    REVIEW OF SYSTEMS:    CONSTITUTIONAL: Generalized weakness; No fevers or chills  EYES/ENT: No visual changes, no throat pain   RESPIRATORY: No cough, wheezing; No shortness of breath  CARDIOVASCULAR: No chest pain or palpitations  GASTROINTESTINAL: No abdominal pain, nausea, vomiting; No diarrhea or constipation. No melena or hematochezia.  GENITOURINARY: No dysuria, frequency or hematuria  NEUROLOGICAL: No dizziness, numbness, or weakness  SKIN: No itching, burning, rashes, or lesions   All other review of systems is negative unless indicated above.    VITAL SIGNS:  Vital Signs Last 24 Hrs  T(C): 36.4 (24 May 2018 05:37), Max: 36.7 (23 May 2018 19:27)  T(F): 97.5 (24 May 2018 05:37), Max: 98 (23 May 2018 19:27)  HR: 88 (24 May 2018 08:11) (72 - 96)  BP: 166/81 (24 May 2018 05:37) (153/89 - 184/91)  BP(mean): --  RR: 16 (24 May 2018 05:37) (16 - 19)  SpO2: 94% (24 May 2018 05:37) (94% - 99%)      PHYSICAL EXAM:     General: Well developed, well nourished, NAD, elderly  	HEENT: NCAT, PERRLA, EOMI b/ll, moist mucous membranes   	Neck: Supple, nontender  	Neurology: A&Ox3, nonfocal, CN II-XII grossly intact, sensation intact, intention tremor noted in b/l hands, pt able to move all 4 extremities, muscle strength 4+/5 b/l UE and LE  	Respiratory: CTA B/L, No W/R/R  	CV: RRR, +S1/S2, no murmurs, rubs or gallops  	Abdominal: Soft, NT, ND +BSx4  	Extremities: No LE edema, + peripheral pulses  	MSK: no joint erythema or warmth, no joint swelling   Skin: warm, dry, normal color, no rash; several old scabs on b/l shins, small abrasion noted on R elbow                          12.5   4.37  )-----------( 173      ( 24 May 2018 07:26 )             35.9     05-24    146<H>  |  111<H>  |  18  ----------------------------<  84  3.5   |  26  |  1.30    Ca    8.6      24 May 2018 07:26  Phos  3.0     05-24  Mg     2.2     05-24    TPro  6.4  /  Alb  3.3  /  TBili  0.5  /  DBili  x   /  AST  28  /  ALT  33  /  AlkPhos  71  05-23      MEDICATIONS  (STANDING):  aspirin enteric coated 81 milliGRAM(s) Oral daily  atorvastatin 10 milliGRAM(s) Oral at bedtime  buPROPion XL . 300 milliGRAM(s) Oral daily  docusate sodium 100 milliGRAM(s) Oral three times a day  famotidine    Tablet 20 milliGRAM(s) Oral daily  heparin  Injectable 5000 Unit(s) SubCutaneous every 8 hours  lactobacillus acidophilus 1 Tablet(s) Oral four times a day with meals  senna 2 Tablet(s) Oral at bedtime  sucralfate 1 Gram(s) Oral <User Schedule>  tamsulosin 0.4 milliGRAM(s) Oral at bedtime

## 2018-05-26 NOTE — PROGRESS NOTE ADULT - ASSESSMENT
The patient is an 88 year old male with a history of BPH, TIA, anxiety, duodenal ulcer who was admitted yesterday after being found down, possible syncope.    Plan:  - ECG with mild QTc prolongation, otherwise no predisposing features for syncope  - No events on telemetry  - Echocardiogram with normal LV systolic function, no significant valve issues  - Cardiac enzymes negative  - Discharge planning
87 y/o M with PMH of anxiety, BPH, TIA (03/2018), h/o diverticulitis and duodenal ulcer (03/2018), [facility] after falling down stairs and unable to get up, admitted for fall - mechanical fall vs syncope, D/c planning for [facility] tomorrow.
The patient is an 88 year old male with a history of BPH, TIA, anxiety, duodenal ulcer who was admitted yesterday after being found down, possible syncope.    Plan:  - ECG with mild QTc prolongation, otherwise no predisposing features for syncope  - No events on telemetry  - Echocardiogram with normal LV systolic function, no significant valve issues  - Cardiac enzymes negative  - Neurology eval  - PT
89 y/o M with PMH of anxiety, BPH, TIA (03/2018), h/o diverticulitis and duodenal ulcer (03/2018), [facility] after falling down stairs and unable to get up, admitted for fall - mechanical fall vs syncope.

## 2018-05-26 NOTE — PROGRESS NOTE ADULT - PROBLEM SELECTOR PROBLEM 1
Left lower quadrant pain
Fall, initial encounter
Fall, initial encounter

## 2018-05-26 NOTE — PROGRESS NOTE ADULT - PROBLEM SELECTOR PLAN 1
resolving, no tenderness on exam, remains afebrile, no new labd  +bm yesterday  cont bowel regimen  bacid one tab po tid  levsin 0.125 po tid for spasms  serial abd exams  pain control  dc planning

## 2018-05-26 NOTE — PROGRESS NOTE ADULT - PROVIDER SPECIALTY LIST ADULT
Cardiology
Gastroenterology
Hospitalist
Neurology
Cardiology
Hospitalist

## 2018-08-17 ENCOUNTER — EMERGENCY (EMERGENCY)
Facility: HOSPITAL | Age: 83
LOS: 1 days | Discharge: HOME | End: 2018-08-17
Attending: EMERGENCY MEDICINE
Payer: MEDICARE

## 2018-08-17 ENCOUNTER — TRANSCRIPTION ENCOUNTER (OUTPATIENT)
Age: 83
End: 2018-08-17

## 2018-08-17 VITALS
HEIGHT: 70 IN | TEMPERATURE: 98 F | RESPIRATION RATE: 14 BRPM | DIASTOLIC BLOOD PRESSURE: 99 MMHG | OXYGEN SATURATION: 98 % | HEART RATE: 74 BPM | WEIGHT: 164.91 LBS | SYSTOLIC BLOOD PRESSURE: 197 MMHG

## 2018-08-17 VITALS
RESPIRATION RATE: 18 BRPM | HEART RATE: 84 BPM | OXYGEN SATURATION: 100 % | TEMPERATURE: 98 F | DIASTOLIC BLOOD PRESSURE: 83 MMHG | SYSTOLIC BLOOD PRESSURE: 172 MMHG

## 2018-08-17 DIAGNOSIS — K46.9 UNSPECIFIED ABDOMINAL HERNIA WITHOUT OBSTRUCTION OR GANGRENE: Chronic | ICD-10-CM

## 2018-08-17 PROCEDURE — 73502 X-RAY EXAM HIP UNI 2-3 VIEWS: CPT | Mod: 26,RT

## 2018-08-17 PROCEDURE — 73130 X-RAY EXAM OF HAND: CPT | Mod: 26,LT

## 2018-08-17 PROCEDURE — 90715 TDAP VACCINE 7 YRS/> IM: CPT

## 2018-08-17 PROCEDURE — 90471 IMMUNIZATION ADMIN: CPT

## 2018-08-17 PROCEDURE — 73130 X-RAY EXAM OF HAND: CPT

## 2018-08-17 PROCEDURE — 99284 EMERGENCY DEPT VISIT MOD MDM: CPT | Mod: 25

## 2018-08-17 PROCEDURE — 73552 X-RAY EXAM OF FEMUR 2/>: CPT

## 2018-08-17 PROCEDURE — 73552 X-RAY EXAM OF FEMUR 2/>: CPT | Mod: 26,RT

## 2018-08-17 PROCEDURE — 99283 EMERGENCY DEPT VISIT LOW MDM: CPT | Mod: 25

## 2018-08-17 PROCEDURE — 73502 X-RAY EXAM HIP UNI 2-3 VIEWS: CPT

## 2018-08-17 RX ORDER — TETANUS TOXOID, REDUCED DIPHTHERIA TOXOID AND ACELLULAR PERTUSSIS VACCINE, ADSORBED 5; 2.5; 8; 8; 2.5 [IU]/.5ML; [IU]/.5ML; UG/.5ML; UG/.5ML; UG/.5ML
0.5 SUSPENSION INTRAMUSCULAR ONCE
Qty: 0 | Refills: 0 | Status: COMPLETED | OUTPATIENT
Start: 2018-08-17 | End: 2018-08-17

## 2018-08-17 RX ADMIN — TETANUS TOXOID, REDUCED DIPHTHERIA TOXOID AND ACELLULAR PERTUSSIS VACCINE, ADSORBED 0.5 MILLILITER(S): 5; 2.5; 8; 8; 2.5 SUSPENSION INTRAMUSCULAR at 19:22

## 2018-08-17 NOTE — ED ADULT NURSE NOTE - CHIEF COMPLAINT QUOTE
S/P fall yesterday and today. Now c/o pain to right hip. patient is from [facility] urgent care.

## 2018-08-17 NOTE — ED PROVIDER NOTE - OBJECTIVE STATEMENT
Pt is a 87 yo male who presents to the ED with a cc of mechanical fall.  PMHx of anxiety, BPH, diverticulosis, duodenal ulcer, h/o TIA.  Pt reports that he uses a walker to ambulate.  He reports that he was outside and the walker slid out from under him, he lost his balance, and fell to the ground landing on his right hip and striking his left hand.  Pt denies striking his head, denies LOC.  He is on ASA but no other blood thinners.  Pt was able to stand and ambulate after the incident but reported pain to his right hip and so was seen at urgent care.  Reports that they clean and repaired a laceration to his left thumb with Dermabond and preformed a right hip x-ray.  They were concerned about possible fracture and so they sent the pt to the ED.  pt reports that it has been several hours since the fall and that he is able to ambulate with only mild soreness at the right hip.  Denies HA, N/V, CP, SOB, abd pain, ext numbness or weakness.  Pt is right hand dominant.

## 2018-08-17 NOTE — ED ADULT TRIAGE NOTE - CHIEF COMPLAINT QUOTE
S/P fall yesterday and today. Now c/o pain to right hip. patient is from [facility] urgent care.

## 2018-08-17 NOTE — ED PROVIDER NOTE - PHYSICAL EXAMINATION
Left thumb: Repaired laceration noted to palmar aspect distal to DIP region, laceration was repaired with Dermabond, no surrounding cellulitis.  Sensation intact +radial pulse, cap refill less then 2 seconds, FROM  Abrasion noted to right shin no gail TTP    Contusion and swelling noted to right lateral hip/upper femur region, sensation intact to leg, +pedal pulse

## 2018-08-17 NOTE — ED ADULT NURSE NOTE - INTERVENTIONS DEFINITIONS
Provide visual cue, wrist band, yellow gown, etc./Monitor for mental status changes and reorient to person, place, and time/Reinforce activity limits and safety measures with patient and family/Stretcher in lowest position, wheels locked, appropriate side rails in place/Monitor gait and stability/Instruct patient to call for assistance/Room bathroom lighting operational/Non-slip footwear when patient is off stretcher/Physically safe environment: no spills, clutter or unnecessary equipment/Provide visual clues: red socks

## 2018-08-17 NOTE — ED ADULT NURSE NOTE - OBJECTIVE STATEMENT
Patient presents to ED s/p fall from urgent care with swelling to right hip and laceration to left  thumb cleaned and dressed by urgent care.

## 2018-08-17 NOTE — ED PROVIDER NOTE - CARE PLAN
Principal Discharge DX:	Contusion  Assessment and plan of treatment:	Return to the ED for any new or worsening symptoms  Take your medication as prescribed  Tylenol or Motrin per label instructions as needed for pain   Compression as instructed   Heating pad to affected site on 20 min off 40 min as needed for pain and swelling  Follow up with your PMD in 2-3 days for a recheck   Advance activity as tolerated  Secondary Diagnosis:	Hip pain  Secondary Diagnosis:	Abrasions of multiple sites

## 2018-08-17 NOTE — ED ADULT NURSE NOTE - ED STAT RN HANDOFF DETAILS
Patient discharged as per MD order, discharged by JENN Brizuela in no acute distress, escorted by son in law.

## 2018-08-17 NOTE — ED PROVIDER NOTE - PROGRESS NOTE DETAILS
Results of images reviewed, copy provided, all questions answered.  Pt ambulating in the ED with no hip pain at this time.  Stable for discharge home with outpatient follow up

## 2018-08-18 PROBLEM — K57.92 DIVERTICULITIS OF INTESTINE, PART UNSPECIFIED, WITHOUT PERFORATION OR ABSCESS WITHOUT BLEEDING: Chronic | Status: ACTIVE | Noted: 2018-05-23

## 2018-08-18 PROBLEM — K26.9 DUODENAL ULCER, UNSPECIFIED AS ACUTE OR CHRONIC, WITHOUT HEMORRHAGE OR PERFORATION: Chronic | Status: ACTIVE | Noted: 2018-05-23

## 2018-09-10 ENCOUNTER — INPATIENT (INPATIENT)
Facility: HOSPITAL | Age: 83
LOS: 2 days | Discharge: OTHER INSTITUTION | DRG: 69 | End: 2018-09-13
Attending: INTERNAL MEDICINE | Admitting: INTERNAL MEDICINE
Payer: MEDICARE

## 2018-09-10 VITALS
HEART RATE: 90 BPM | TEMPERATURE: 98 F | RESPIRATION RATE: 14 BRPM | OXYGEN SATURATION: 96 % | SYSTOLIC BLOOD PRESSURE: 153 MMHG | DIASTOLIC BLOOD PRESSURE: 88 MMHG | HEIGHT: 70 IN | WEIGHT: 164.02 LBS

## 2018-09-10 DIAGNOSIS — R26.2 DIFFICULTY IN WALKING, NOT ELSEWHERE CLASSIFIED: ICD-10-CM

## 2018-09-10 DIAGNOSIS — F41.9 ANXIETY DISORDER, UNSPECIFIED: ICD-10-CM

## 2018-09-10 DIAGNOSIS — N18.3 CHRONIC KIDNEY DISEASE, STAGE 3 (MODERATE): ICD-10-CM

## 2018-09-10 DIAGNOSIS — K26.9 DUODENAL ULCER, UNSPECIFIED AS ACUTE OR CHRONIC, WITHOUT HEMORRHAGE OR PERFORATION: ICD-10-CM

## 2018-09-10 DIAGNOSIS — K46.9 UNSPECIFIED ABDOMINAL HERNIA WITHOUT OBSTRUCTION OR GANGRENE: Chronic | ICD-10-CM

## 2018-09-10 DIAGNOSIS — Z29.9 ENCOUNTER FOR PROPHYLACTIC MEASURES, UNSPECIFIED: ICD-10-CM

## 2018-09-10 DIAGNOSIS — N40.0 BENIGN PROSTATIC HYPERPLASIA WITHOUT LOWER URINARY TRACT SYMPTOMS: ICD-10-CM

## 2018-09-10 DIAGNOSIS — R53.1 WEAKNESS: ICD-10-CM

## 2018-09-10 DIAGNOSIS — R55 SYNCOPE AND COLLAPSE: ICD-10-CM

## 2018-09-10 DIAGNOSIS — G45.9 TRANSIENT CEREBRAL ISCHEMIC ATTACK, UNSPECIFIED: ICD-10-CM

## 2018-09-10 LAB
ALBUMIN SERPL ELPH-MCNC: 3.2 G/DL — LOW (ref 3.3–5)
ALP SERPL-CCNC: 71 U/L — SIGNIFICANT CHANGE UP (ref 40–120)
ALT FLD-CCNC: 25 U/L — SIGNIFICANT CHANGE UP (ref 12–78)
ANION GAP SERPL CALC-SCNC: 7 MMOL/L — SIGNIFICANT CHANGE UP (ref 5–17)
APPEARANCE UR: CLEAR — SIGNIFICANT CHANGE UP
APTT BLD: 30.7 SEC — SIGNIFICANT CHANGE UP (ref 27.5–37.4)
AST SERPL-CCNC: 19 U/L — SIGNIFICANT CHANGE UP (ref 15–37)
BASOPHILS # BLD AUTO: 0.03 K/UL — SIGNIFICANT CHANGE UP (ref 0–0.2)
BASOPHILS NFR BLD AUTO: 0.6 % — SIGNIFICANT CHANGE UP (ref 0–2)
BILIRUB SERPL-MCNC: 0.5 MG/DL — SIGNIFICANT CHANGE UP (ref 0.2–1.2)
BILIRUB UR-MCNC: NEGATIVE — SIGNIFICANT CHANGE UP
BUN SERPL-MCNC: 32 MG/DL — HIGH (ref 7–23)
CALCIUM SERPL-MCNC: 8.5 MG/DL — SIGNIFICANT CHANGE UP (ref 8.5–10.1)
CHLORIDE SERPL-SCNC: 112 MMOL/L — HIGH (ref 96–108)
CO2 SERPL-SCNC: 26 MMOL/L — SIGNIFICANT CHANGE UP (ref 22–31)
COLOR SPEC: YELLOW — SIGNIFICANT CHANGE UP
COMMENT - URINE: SIGNIFICANT CHANGE UP
CREAT SERPL-MCNC: 1.5 MG/DL — HIGH (ref 0.5–1.3)
DIFF PNL FLD: NEGATIVE — SIGNIFICANT CHANGE UP
EOSINOPHIL # BLD AUTO: 0.26 K/UL — SIGNIFICANT CHANGE UP (ref 0–0.5)
EOSINOPHIL NFR BLD AUTO: 5.2 % — SIGNIFICANT CHANGE UP (ref 0–6)
GLUCOSE SERPL-MCNC: 104 MG/DL — HIGH (ref 70–99)
GLUCOSE UR QL: NEGATIVE — SIGNIFICANT CHANGE UP
HCT VFR BLD CALC: 36.2 % — LOW (ref 39–50)
HGB BLD-MCNC: 12.2 G/DL — LOW (ref 13–17)
IMM GRANULOCYTES NFR BLD AUTO: 0.4 % — SIGNIFICANT CHANGE UP (ref 0–1.5)
INR BLD: 1.09 RATIO — SIGNIFICANT CHANGE UP (ref 0.88–1.16)
KETONES UR-MCNC: NEGATIVE — SIGNIFICANT CHANGE UP
LACTATE SERPL-SCNC: 1.8 MMOL/L — SIGNIFICANT CHANGE UP (ref 0.7–2)
LEUKOCYTE ESTERASE UR-ACNC: NEGATIVE — SIGNIFICANT CHANGE UP
LYMPHOCYTES # BLD AUTO: 0.76 K/UL — LOW (ref 1–3.3)
LYMPHOCYTES # BLD AUTO: 15.3 % — SIGNIFICANT CHANGE UP (ref 13–44)
MCHC RBC-ENTMCNC: 30.9 PG — SIGNIFICANT CHANGE UP (ref 27–34)
MCHC RBC-ENTMCNC: 33.7 GM/DL — SIGNIFICANT CHANGE UP (ref 32–36)
MCV RBC AUTO: 91.6 FL — SIGNIFICANT CHANGE UP (ref 80–100)
MONOCYTES # BLD AUTO: 0.4 K/UL — SIGNIFICANT CHANGE UP (ref 0–0.9)
MONOCYTES NFR BLD AUTO: 8 % — SIGNIFICANT CHANGE UP (ref 2–14)
NEUTROPHILS # BLD AUTO: 3.51 K/UL — SIGNIFICANT CHANGE UP (ref 1.8–7.4)
NEUTROPHILS NFR BLD AUTO: 70.5 % — SIGNIFICANT CHANGE UP (ref 43–77)
NITRITE UR-MCNC: NEGATIVE — SIGNIFICANT CHANGE UP
NRBC # BLD: 0 /100 WBCS — SIGNIFICANT CHANGE UP (ref 0–0)
PH UR: 5 — SIGNIFICANT CHANGE UP (ref 5–8)
PLATELET # BLD AUTO: 151 K/UL — SIGNIFICANT CHANGE UP (ref 150–400)
POTASSIUM SERPL-MCNC: 4.1 MMOL/L — SIGNIFICANT CHANGE UP (ref 3.5–5.3)
POTASSIUM SERPL-SCNC: 4.1 MMOL/L — SIGNIFICANT CHANGE UP (ref 3.5–5.3)
PROT SERPL-MCNC: 6.1 G/DL — SIGNIFICANT CHANGE UP (ref 6–8.3)
PROT UR-MCNC: NEGATIVE — SIGNIFICANT CHANGE UP
PROTHROM AB SERPL-ACNC: 11.9 SEC — SIGNIFICANT CHANGE UP (ref 9.8–12.7)
RBC # BLD: 3.95 M/UL — LOW (ref 4.2–5.8)
RBC # FLD: 14.2 % — SIGNIFICANT CHANGE UP (ref 10.3–14.5)
SODIUM SERPL-SCNC: 145 MMOL/L — SIGNIFICANT CHANGE UP (ref 135–145)
SP GR SPEC: 1.01 — SIGNIFICANT CHANGE UP (ref 1.01–1.02)
UROBILINOGEN FLD QL: NEGATIVE — SIGNIFICANT CHANGE UP
WBC # BLD: 4.98 K/UL — SIGNIFICANT CHANGE UP (ref 3.8–10.5)
WBC # FLD AUTO: 4.98 K/UL — SIGNIFICANT CHANGE UP (ref 3.8–10.5)

## 2018-09-10 PROCEDURE — 70450 CT HEAD/BRAIN W/O DYE: CPT | Mod: 26,77

## 2018-09-10 PROCEDURE — 71045 X-RAY EXAM CHEST 1 VIEW: CPT | Mod: 26

## 2018-09-10 PROCEDURE — 93010 ELECTROCARDIOGRAM REPORT: CPT

## 2018-09-10 PROCEDURE — 99285 EMERGENCY DEPT VISIT HI MDM: CPT

## 2018-09-10 PROCEDURE — 70450 CT HEAD/BRAIN W/O DYE: CPT | Mod: 26

## 2018-09-10 RX ORDER — ATORVASTATIN CALCIUM 80 MG/1
10 TABLET, FILM COATED ORAL AT BEDTIME
Qty: 0 | Refills: 0 | Status: DISCONTINUED | OUTPATIENT
Start: 2018-09-10 | End: 2018-09-13

## 2018-09-10 RX ORDER — SODIUM CHLORIDE 9 MG/ML
1000 INJECTION INTRAMUSCULAR; INTRAVENOUS; SUBCUTANEOUS ONCE
Qty: 0 | Refills: 0 | Status: COMPLETED | OUTPATIENT
Start: 2018-09-10 | End: 2018-09-10

## 2018-09-10 RX ORDER — MECLIZINE HCL 12.5 MG
1 TABLET ORAL
Qty: 0 | Refills: 0 | COMMUNITY

## 2018-09-10 RX ORDER — ALPRAZOLAM 0.25 MG
1 TABLET ORAL
Qty: 0 | Refills: 0 | COMMUNITY

## 2018-09-10 RX ORDER — HYDRALAZINE HCL 50 MG
20 TABLET ORAL ONCE
Qty: 0 | Refills: 0 | Status: COMPLETED | OUTPATIENT
Start: 2018-09-10 | End: 2018-09-10

## 2018-09-10 RX ORDER — DOCUSATE SODIUM 100 MG
100 CAPSULE ORAL THREE TIMES A DAY
Qty: 0 | Refills: 0 | Status: DISCONTINUED | OUTPATIENT
Start: 2018-09-10 | End: 2018-09-13

## 2018-09-10 RX ORDER — SUCRALFATE 1 G
1 TABLET ORAL
Qty: 0 | Refills: 0 | Status: DISCONTINUED | OUTPATIENT
Start: 2018-09-10 | End: 2018-09-13

## 2018-09-10 RX ORDER — HEPARIN SODIUM 5000 [USP'U]/ML
5000 INJECTION INTRAVENOUS; SUBCUTANEOUS EVERY 8 HOURS
Qty: 0 | Refills: 0 | Status: DISCONTINUED | OUTPATIENT
Start: 2018-09-10 | End: 2018-09-13

## 2018-09-10 RX ORDER — SENNA PLUS 8.6 MG/1
2 TABLET ORAL AT BEDTIME
Qty: 0 | Refills: 0 | Status: DISCONTINUED | OUTPATIENT
Start: 2018-09-10 | End: 2018-09-13

## 2018-09-10 RX ORDER — PANTOPRAZOLE SODIUM 20 MG/1
40 TABLET, DELAYED RELEASE ORAL
Qty: 0 | Refills: 0 | Status: DISCONTINUED | OUTPATIENT
Start: 2018-09-10 | End: 2018-09-13

## 2018-09-10 RX ORDER — HYDRALAZINE HCL 50 MG
25 TABLET ORAL EVERY 6 HOURS
Qty: 0 | Refills: 0 | Status: DISCONTINUED | OUTPATIENT
Start: 2018-09-10 | End: 2018-09-13

## 2018-09-10 RX ORDER — TAMSULOSIN HYDROCHLORIDE 0.4 MG/1
0.4 CAPSULE ORAL AT BEDTIME
Qty: 0 | Refills: 0 | Status: DISCONTINUED | OUTPATIENT
Start: 2018-09-10 | End: 2018-09-13

## 2018-09-10 RX ORDER — BUPROPION HYDROCHLORIDE 150 MG/1
1 TABLET, EXTENDED RELEASE ORAL
Qty: 0 | Refills: 0 | COMMUNITY

## 2018-09-10 RX ORDER — BUPROPION HYDROCHLORIDE 150 MG/1
150 TABLET, EXTENDED RELEASE ORAL DAILY
Qty: 0 | Refills: 0 | Status: DISCONTINUED | OUTPATIENT
Start: 2018-09-10 | End: 2018-09-13

## 2018-09-10 RX ORDER — ALPRAZOLAM 0.25 MG
1 TABLET ORAL THREE TIMES A DAY
Qty: 0 | Refills: 0 | Status: DISCONTINUED | OUTPATIENT
Start: 2018-09-10 | End: 2018-09-13

## 2018-09-10 RX ORDER — AMLODIPINE BESYLATE 2.5 MG/1
5 TABLET ORAL DAILY
Qty: 0 | Refills: 0 | Status: DISCONTINUED | OUTPATIENT
Start: 2018-09-10 | End: 2018-09-11

## 2018-09-10 RX ORDER — ASPIRIN/CALCIUM CARB/MAGNESIUM 324 MG
81 TABLET ORAL DAILY
Qty: 0 | Refills: 0 | Status: DISCONTINUED | OUTPATIENT
Start: 2018-09-10 | End: 2018-09-12

## 2018-09-10 RX ADMIN — HEPARIN SODIUM 5000 UNIT(S): 5000 INJECTION INTRAVENOUS; SUBCUTANEOUS at 21:47

## 2018-09-10 RX ADMIN — Medication 1 GRAM(S): at 21:47

## 2018-09-10 RX ADMIN — ATORVASTATIN CALCIUM 10 MILLIGRAM(S): 80 TABLET, FILM COATED ORAL at 21:47

## 2018-09-10 RX ADMIN — Medication 25 MILLIGRAM(S): at 19:06

## 2018-09-10 RX ADMIN — AMLODIPINE BESYLATE 5 MILLIGRAM(S): 2.5 TABLET ORAL at 18:09

## 2018-09-10 RX ADMIN — SODIUM CHLORIDE 1000 MILLILITER(S): 9 INJECTION INTRAMUSCULAR; INTRAVENOUS; SUBCUTANEOUS at 10:00

## 2018-09-10 RX ADMIN — Medication 100 MILLIGRAM(S): at 21:47

## 2018-09-10 RX ADMIN — Medication 20 MILLIGRAM(S): at 22:19

## 2018-09-10 RX ADMIN — TAMSULOSIN HYDROCHLORIDE 0.4 MILLIGRAM(S): 0.4 CAPSULE ORAL at 21:47

## 2018-09-10 NOTE — CHART NOTE - NSCHARTNOTEFT_GEN_A_CORE
Resident Rapid Response Note    Patient is a 88y old  Male who presents with a chief complaint of dizziness (10 Sep 2018 17:44)    Code Stroke was called at on this 88y Male patient for slurred speech and right arm and RLE weakness.     Patient was seen and examined at the bedside by the rapid response team. Patient called nursing stating he feels unwell. Nursing noted that his speech was slurred and that when the patient tried to scratch his nose, he complained that he could only reach his chin with his right hand. The patient also stated his RLE felt much weaker and had some numbness. Nursing then called code stroke.     Rapid Response Vital Signs:  BP: 117/72  HR: 92  RR: 14  SpO2: 93 % on RA  Temp: Not taken  FS: 112    Vital Signs Last 24 Hrs  T(C): 36.6 (10 Sep 2018 23:09), Max: 36.8 (10 Sep 2018 09:50)  T(F): 97.8 (10 Sep 2018 23:09), Max: 98.2 (10 Sep 2018 09:50)  HR: 93 (10 Sep 2018 23:09) (73 - 98)  BP: 115/70 (10 Sep 2018 23:09) (115/70 - 212/119)  BP(mean): --  RR: 18 (10 Sep 2018 23:09) (14 - 18)  SpO2: 96% (10 Sep 2018 23:09) (95% - 99%)    Physical Exam:  General: Well developed, well nourished, NAD  HEENT: NCAT, PERRLA, EOMI bl, moist mucous membranes   Neck: Supple, nontender  Neurology: A&Ox4, nonfocal, CN II-XII grossly intact, decreased sensation along right side including arm from deltoid to hand, and along right thigh to RLE.   Respiratory: CTA B/L, No W/R/R  CV: RRR, +S1/S2, no murmurs, rubs or gallops  Abdominal: Soft, NT, ND +BSx4  Extremities: No C/C/E, 2+ peripheral pulses  MSK: Normal ROM, strength 5/5 in all 4 extremities, no joint erythema or warmth, no joint swelling   Skin: warm, dry, normal color, no rash or abnormal lesions    LABS:                        12.2   4.98  )-----------( 151      ( 10 Sep 2018 10:12 )             36.2     10 Sep 2018 10:12    145    |  112    |  32     ----------------------------<  104    4.1     |  26     |  1.50     Ca    8.5        10 Sep 2018 10:12    TPro  6.1    /  Alb  3.2    /  TBili  0.5    /  DBili  x      /  AST  19     /  ALT  25     /  AlkPhos  71     10 Sep 2018 10:12    PT/INR - ( 10 Sep 2018 10:12 )   PT: 11.9 sec;   INR: 1.09 ratio         PTT - ( 10 Sep 2018 10:12 )  PTT:30.7 sec  Urinalysis Basic - ( 10 Sep 2018 11:25 )    Color: Yellow / Appearance: Clear / S.015 / pH: x  Gluc: x / Ketone: Negative  / Bili: Negative / Urobili: Negative   Blood: x / Protein: Negative / Nitrite: Negative   Leuk Esterase: Negative / RBC: x / WBC x   Sq Epi: x / Non Sq Epi: x / Bacteria: x        Assessment/Plan:  89 y/o M with PMHx of BPH, TIA, anxiety, duodenal ulcer, multiple falls admitted with presyncope and weakness.    Plan;  -Code stroke called for slurred speech and right sided weakness which is unchanged from his admission today. As per patient, all symptoms are new.   -STAT Head CT to rule out acute hemorrhagic stroke, even though CT from earlier was negative. At this time symptoms appear to be resolved. No focal deficit.  -ICU PA present at bedside. Agrees with plan as above. Dr. Perlman made aware.   -Will continue to follow, RN to call if any changes.    Dr. Lorenzo  PGY-1 x [phone]

## 2018-09-10 NOTE — H&P ADULT - PROBLEM SELECTOR PLAN 6
continue buproprion 150mg daily  continue xanax 1mg TID PRN (prescribed for 1mg qid PRN, has been taking less)

## 2018-09-10 NOTE — ED PROVIDER NOTE - PROGRESS NOTE DETAILS
Pt seen by Dr Yu and Dr TARIK Carter, no acute issues at this time, should call PT. Pt seen by PT, pt unable to ambulate, will need admission, possible rehab placement. Dw Dr Perlman ([facility]), will see pt to admit. [facility] resident

## 2018-09-10 NOTE — ED ADULT NURSE NOTE - NSIMPLEMENTINTERV_GEN_ALL_ED
Implemented All Fall with Harm Risk Interventions:  [place] to call system. Call bell, personal items and telephone within reach. Instruct patient to call for assistance. Room bathroom lighting operational. Non-slip footwear when patient is off stretcher. Physically safe environment: no spills, clutter or unnecessary equipment. Stretcher in lowest position, wheels locked, appropriate side rails in place. Provide visual cue, wrist band, yellow gown, etc. Monitor gait and stability. Monitor for mental status changes and reorient to person, place, and time. Review medications for side effects contributing to fall risk. Reinforce activity limits and safety measures with patient and family. Provide visual clues: red socks.

## 2018-09-10 NOTE — H&P ADULT - PROBLEM SELECTOR PLAN 4
possibly GLENN on CKD, but appears to be near baseline renal function  s/p 1L NS bolus in the ED; will continue to encourage PO intake  avoid nephrotoxic agents

## 2018-09-10 NOTE — CHART NOTE - NSCHARTNOTEFT_GEN_A_CORE
Called by RN for continued elevations of -188.  Patient is comfortably sitting in bed. Denies CP, SOB, palpitations, Abdominal pain, n/v.    T(C): 36.6 (18 @ 04:59), Max: 36.8 (09-10-18 @ 09:50)  HR: 92 (18 @ 04:59) (73 - 98)  BP: 99/62 (18 @ 04:59) (99/62 - 212/119)  RR: 16 (18 @ 04:59) (14 - 18)  SpO2: 96% (18 @ 04:59) (95% - 99%)  Wt(kg): --    Physical :  Gen- NAD, ncat  Cardio - s+1,s+2, rrr, no murmur  Lung - cta b/l, no wheeze, no rhonchi, no rales   Abdomen- +BS, NT/ND, no guarding, no rebound, no masses  Ext- no edema, 2+ pulses b/l    LABS:                        12.2   4.98  )-----------( 151      ( 10 Sep 2018 10:12 )             36.2     09-10    145  |  112<H>  |  32<H>  ----------------------------<  104<H>  4.1   |  26  |  1.50<H>    Ca    8.5      10 Sep 2018 10:12    TPro  6.1  /  Alb  3.2<L>  /  TBili  0.5  /  DBili  x   /  AST  19  /  ALT  25  /  AlkPhos  71  09-10    PT/INR - ( 10 Sep 2018 10:12 )   PT: 11.9 sec;   INR: 1.09 ratio         PTT - ( 10 Sep 2018 10:12 )  PTT:30.7 sec  Urinalysis Basic - ( 10 Sep 2018 11:25 )    Color: Yellow / Appearance: Clear / S.015 / pH: x  Gluc: x / Ketone: Negative  / Bili: Negative / Urobili: Negative   Blood: x / Protein: Negative / Nitrite: Negative   Leuk Esterase: Negative / RBC: x / WBC x   Sq Epi: x / Non Sq Epi: x / Bacteria: x      Assessment/Plan  89 y/o M with PMHx of BPH, TIA, anxiety, duodenal ulcer, multiple falls admitted with presyncope and weakness.  1.  Hydralyzine 10 mg IV push for SBP>180.  RN to call with any changes.

## 2018-09-10 NOTE — H&P ADULT - PROBLEM SELECTOR PLAN 1
admit to telemetry  muscle weakness from deconditioning  episodes of presyncope, transient slurred speech and multiple falls w/ LE weakness  check orthostatic vital signs; recent echo 5/2018 without significant findings  physical therapy evaluation: likely [facility]

## 2018-09-10 NOTE — H&P ADULT - PROBLEM SELECTOR PLAN 7
on terazosin 10mg at home for BPH  on tolterodine ER 4mg qhs at home for overactive bladder  non-formulary, patient to bring own medications on terazosin 10mg at home for BPH  on tolterodine ER 4mg qhs at home for overactive bladder  will d/c terazosin and tolterodine, which can contribute to orthostasis  switch to flomax 04mg qhs

## 2018-09-10 NOTE — ED ADULT NURSE NOTE - OBJECTIVE STATEMENT
c/o " My legs can't hold me up this morning and i'm dizzy" as stated,and as per wife patient had same episode last week and fell,noticed bruising to right leg/upper thigh

## 2018-09-10 NOTE — CONSULT NOTE ADULT - ASSESSMENT
The patient is an 88 year old male with a history of BPH, TIA, anxiety, duodenal ulcer, multiple falls who presents with pre-syncope.    Plan:  - ECG with no predisposing features for syncope  - Echocardiogram 5/24/18 with normal LV systolic function, no significant valve issues  - Check orthostatics  - PT eval  - Neurology eval  - CT head with no acute pathology  - If above work-up negative, patient can be discharged from a cardiac perspective and follow-up as outpatient

## 2018-09-10 NOTE — ED PROVIDER NOTE - OBJECTIVE STATEMENT
87 yo M p/w gen weakness since last night. Pt with slurred speech since awoke as well. last known nl yesterday. No fever/chills. No cp/sob/palp. no numb to arms / legs. no neck / back pain. no recent illness. no recent trauma. No abd pain. no n/v/d. No cough/sputum. No hematuria. No agg/allev factors. No other inj or co.

## 2018-09-10 NOTE — H&P ADULT - NSHPSOCIALHISTORY_GEN_ALL_CORE
Lives in [place] with his wife  Ambulates with a rolling walker  Denies tobacco use  Denies EtOH use  Denies illicit drug use    IMPROVE VTE Individual Risk Assessment          RISK                                                          Points  [  ] Previous VTE                                                3  [  ] Thrombophilia                                             2  [ x ] Lower limb paralysis                                   2        (unable to hold up >15 seconds)    [  ] Current Cancer                                             2         (within 6 months)  [  ] Immobilization > 24 hrs                              1  [  ] ICU/CCU stay > 24 hours                             1  [ x ] Age > 60                                                         1    IMPROVE VTE Score: 3

## 2018-09-10 NOTE — H&P ADULT - ASSESSMENT
87 y/o M with PMHx of BPH, TIA, anxiety, duodenal ulcer, multiple falls admitted with presyncope and weakness.

## 2018-09-10 NOTE — H&P ADULT - NSHPPHYSICALEXAM_GEN_ALL_CORE
Vital Signs Last 24 Hrs  T(C): 36.6 (10 Sep 2018 12:05), Max: 36.8 (10 Sep 2018 09:50)  T(F): 97.8 (10 Sep 2018 12:05), Max: 98.2 (10 Sep 2018 09:50)  HR: 98 (10 Sep 2018 12:05) (90 - 98)  BP: 150/78 (10 Sep 2018 12:05) (150/78 - 153/88)  RR: 14 (10 Sep 2018 12:05) (14 - 14)  SpO2: 97% (10 Sep 2018 12:05) (96% - 97%)    Physical Exam:  General: elderly male in NAD  HEENT: NCAT, PERRLA, EOMI bl, moist mucous membranes   Neck: Supple, nontender, no mass  Neurology: A&Ox3, nonfocal  Respiratory: CTA B/L, No W/R/R  CV: RRR, +S1/S2, no murmurs, rubs or gallops  Abdominal: Soft, NT, ND +BSx4  Extremities: No C/C/E, + peripheral pulses  MSK: ROM intact, muscle strength 3/5 throughout  Skin: warm, dry, normal color, no rash or abnormal lesions

## 2018-09-10 NOTE — PROVIDER CONTACT NOTE (OTHER) - ACTION/TREATMENT ORDERED:
MD came to see pt. to address BP MD came to see pt. to address BP. Hydralizine IV push ordered. Administered by MD

## 2018-09-10 NOTE — CONSULT NOTE ADULT - ASSESSMENT
For episode of fall and the prodrome of feeling lightheaded, weak all over artemio both legs suspect possible presyncopal event or TIA like symptoms form cerebral hypoperfusion  tele eval   cardiology evaluation   recommend hold antivert  pt eval  monitor sbp -- check orthostatic   spoke to family   neurologic wise stable Covering DR PALMER  For episode of fall and the prodrome of feeling lightheaded, weak all over artemio both legs suspect possible presyncopal event or TIA like symptoms form cerebral hypoperfusion  tele eval   cardiology evaluation   recommend hold antivert  pt eval  monitor sbp -- check orthostatic   spoke to family   neurologic wise stable

## 2018-09-10 NOTE — CONSULT NOTE ADULT - SUBJECTIVE AND OBJECTIVE BOX
History of Present Illness: The patient is an 88 year old male with a history of BPH, TIA, anxiety, duodenal ulcer, multiple falls who presents with pre-syncope. He states that he was walking with his walker when his legs gave out. He had no strength to get up. There was no chest pain, shortness of breath, palpitations, dizziness. There was slurring of the speech which has now resolved. No visual changes. He has had multiple falls over the past few weeks, each episode similar with legs giving out and slurring of speech.    Past Medical/Surgical History:  BPH, TIA, anxiety, duodenal ulcer, multiple falls     Medications:  Home Medications:  aspirin 81 mg oral delayed release tablet: 1 tab(s) orally once a day (25 May 2018 16:19)  atorvastatin 10 mg oral tablet: 1 tab(s) orally once a day (at bedtime) (23 May 2018 18:17)  buPROPion 150 mg/12 hours (SR) oral tablet, extended release: 1 tab(s) orally 2 times a day (23 May 2018 18:17)  Flomax 0.4 mg oral capsule: 1 cap(s) orally once a day (at bedtime) (25 May 2018 16:32)  Levsin SL 0.125 mg sublingual tablet: sublingual 3 times a day, As Needed (25 May 2018 16:38)  Xanax 0.5 mg oral tablet: 1 tab(s) orally 3 times a day, As Needed for anxiety (23 May 2018 18:17)      Family History: Non-contributory family history of premature cardiovascular atherosclerotic disease    Social History: No tobacco, alcohol or drug use    Review of Systems:  General: No fevers, chills, weight loss or gain  Skin: No rashes, color changes  Cardiovascular: No chest pain, orthopnea  Respiratory: No shortness of breath, cough  Gastrointestinal: No nausea, abdominal pain  Genitourinary: No incontinence, pain with urination  Musculoskeletal: No pain, swelling, decreased range of motion  Neurological: No headache, weakness  Psychiatric: No depression, anxiety  Endocrine: No weight loss or gain, increased thirst  All other systems are comprehensively negative.    Physical Exam:  Vitals:        Vital Signs Last 24 Hrs  T(C): 36.8 (10 Sep 2018 09:50), Max: 36.8 (10 Sep 2018 09:50)  T(F): 98.2 (10 Sep 2018 09:50), Max: 98.2 (10 Sep 2018 09:50)  HR: 90 (10 Sep 2018 09:50) (90 - 90)  BP: 153/88 (10 Sep 2018 09:50) (153/88 - 153/88)  BP(mean): --  RR: 14 (10 Sep 2018 09:50) (14 - 14)  SpO2: 96% (10 Sep 2018 09:50) (96% - 96%)  General: NAD  HEENT: MMM  Neck: No JVD, no carotid bruit  Lungs: CTAB  CV: RRR, nl S1/S2, no M/R/G  Abdomen: S/NT/ND, +BS  Extremities: No LE edema, no cyanosis  Neuro: AAOx3, non-focal  Skin: No rash    Labs:                        12.2   4.98  )-----------( 151      ( 10 Sep 2018 10:12 )             36.2     09-10    145  |  112<H>  |  32<H>  ----------------------------<  104<H>  4.1   |  26  |  1.50<H>    Ca    8.5      10 Sep 2018 10:12    TPro  6.1  /  Alb  3.2<L>  /  TBili  0.5  /  DBili  x   /  AST  19  /  ALT  25  /  AlkPhos  71  09-10        PT/INR - ( 10 Sep 2018 10:12 )   PT: 11.9 sec;   INR: 1.09 ratio         PTT - ( 10 Sep 2018 10:12 )  PTT:30.7 sec    ECG: NSR, normal axis, no ST abnormality

## 2018-09-10 NOTE — H&P ADULT - NEGATIVE CARDIOVASCULAR SYMPTOMS
no dyspnea on exertion/no orthopnea/no paroxysmal nocturnal dyspnea/no palpitations/no chest pain/no peripheral edema

## 2018-09-10 NOTE — ED PROVIDER NOTE - CHPI ED SYMPTOMS NEG
no diarrhea/no abdominal pain/no fever/no headache/no chills/no shortness of breath/no vomiting/no rash

## 2018-09-10 NOTE — H&P ADULT - ATTENDING COMMENTS
pt seen and dw housestaff  pt describes orthostatic symptoms- will dc tolteradine and terazosin, and start flomax  pt eval, check orthostatics

## 2018-09-11 DIAGNOSIS — Z71.89 OTHER SPECIFIED COUNSELING: ICD-10-CM

## 2018-09-11 LAB
ANION GAP SERPL CALC-SCNC: 9 MMOL/L — SIGNIFICANT CHANGE UP (ref 5–17)
BASOPHILS # BLD AUTO: 0.04 K/UL — SIGNIFICANT CHANGE UP (ref 0–0.2)
BASOPHILS NFR BLD AUTO: 0.8 % — SIGNIFICANT CHANGE UP (ref 0–2)
BUN SERPL-MCNC: 24 MG/DL — HIGH (ref 7–23)
CALCIUM SERPL-MCNC: 8.7 MG/DL — SIGNIFICANT CHANGE UP (ref 8.5–10.1)
CHLORIDE SERPL-SCNC: 112 MMOL/L — HIGH (ref 96–108)
CO2 SERPL-SCNC: 25 MMOL/L — SIGNIFICANT CHANGE UP (ref 22–31)
CREAT SERPL-MCNC: 1.2 MG/DL — SIGNIFICANT CHANGE UP (ref 0.5–1.3)
CULTURE RESULTS: NO GROWTH — SIGNIFICANT CHANGE UP
EOSINOPHIL # BLD AUTO: 0.18 K/UL — SIGNIFICANT CHANGE UP (ref 0–0.5)
EOSINOPHIL NFR BLD AUTO: 3.6 % — SIGNIFICANT CHANGE UP (ref 0–6)
GLUCOSE SERPL-MCNC: 94 MG/DL — SIGNIFICANT CHANGE UP (ref 70–99)
HCT VFR BLD CALC: 36.7 % — LOW (ref 39–50)
HGB BLD-MCNC: 12.3 G/DL — LOW (ref 13–17)
IMM GRANULOCYTES NFR BLD AUTO: 0.4 % — SIGNIFICANT CHANGE UP (ref 0–1.5)
LYMPHOCYTES # BLD AUTO: 0.78 K/UL — LOW (ref 1–3.3)
LYMPHOCYTES # BLD AUTO: 15.6 % — SIGNIFICANT CHANGE UP (ref 13–44)
MCHC RBC-ENTMCNC: 30.2 PG — SIGNIFICANT CHANGE UP (ref 27–34)
MCHC RBC-ENTMCNC: 33.5 GM/DL — SIGNIFICANT CHANGE UP (ref 32–36)
MCV RBC AUTO: 90.2 FL — SIGNIFICANT CHANGE UP (ref 80–100)
MONOCYTES # BLD AUTO: 0.48 K/UL — SIGNIFICANT CHANGE UP (ref 0–0.9)
MONOCYTES NFR BLD AUTO: 9.6 % — SIGNIFICANT CHANGE UP (ref 2–14)
NEUTROPHILS # BLD AUTO: 3.51 K/UL — SIGNIFICANT CHANGE UP (ref 1.8–7.4)
NEUTROPHILS NFR BLD AUTO: 70 % — SIGNIFICANT CHANGE UP (ref 43–77)
PLATELET # BLD AUTO: 167 K/UL — SIGNIFICANT CHANGE UP (ref 150–400)
POTASSIUM SERPL-MCNC: 3.5 MMOL/L — SIGNIFICANT CHANGE UP (ref 3.5–5.3)
POTASSIUM SERPL-SCNC: 3.5 MMOL/L — SIGNIFICANT CHANGE UP (ref 3.5–5.3)
RBC # BLD: 4.07 M/UL — LOW (ref 4.2–5.8)
RBC # FLD: 14.1 % — SIGNIFICANT CHANGE UP (ref 10.3–14.5)
SODIUM SERPL-SCNC: 146 MMOL/L — HIGH (ref 135–145)
SPECIMEN SOURCE: SIGNIFICANT CHANGE UP
WBC # BLD: 5.01 K/UL — SIGNIFICANT CHANGE UP (ref 3.8–10.5)
WBC # FLD AUTO: 5.01 K/UL — SIGNIFICANT CHANGE UP (ref 3.8–10.5)

## 2018-09-11 PROCEDURE — 70544 MR ANGIOGRAPHY HEAD W/O DYE: CPT | Mod: 26,59

## 2018-09-11 PROCEDURE — 70551 MRI BRAIN STEM W/O DYE: CPT | Mod: 26

## 2018-09-11 PROCEDURE — 93880 EXTRACRANIAL BILAT STUDY: CPT | Mod: 26

## 2018-09-11 RX ORDER — AMLODIPINE BESYLATE 2.5 MG/1
5 TABLET ORAL DAILY
Qty: 0 | Refills: 0 | Status: DISCONTINUED | OUTPATIENT
Start: 2018-09-11 | End: 2018-09-12

## 2018-09-11 RX ORDER — POTASSIUM CHLORIDE 20 MEQ
40 PACKET (EA) ORAL ONCE
Qty: 0 | Refills: 0 | Status: COMPLETED | OUTPATIENT
Start: 2018-09-11 | End: 2018-09-11

## 2018-09-11 RX ADMIN — Medication 100 MILLIGRAM(S): at 21:28

## 2018-09-11 RX ADMIN — Medication 1 GRAM(S): at 13:36

## 2018-09-11 RX ADMIN — HEPARIN SODIUM 5000 UNIT(S): 5000 INJECTION INTRAVENOUS; SUBCUTANEOUS at 21:28

## 2018-09-11 RX ADMIN — TAMSULOSIN HYDROCHLORIDE 0.4 MILLIGRAM(S): 0.4 CAPSULE ORAL at 21:28

## 2018-09-11 RX ADMIN — Medication 1 GRAM(S): at 05:34

## 2018-09-11 RX ADMIN — PANTOPRAZOLE SODIUM 40 MILLIGRAM(S): 20 TABLET, DELAYED RELEASE ORAL at 05:33

## 2018-09-11 RX ADMIN — ATORVASTATIN CALCIUM 10 MILLIGRAM(S): 80 TABLET, FILM COATED ORAL at 21:28

## 2018-09-11 RX ADMIN — BUPROPION HYDROCHLORIDE 150 MILLIGRAM(S): 150 TABLET, EXTENDED RELEASE ORAL at 11:35

## 2018-09-11 RX ADMIN — Medication 100 MILLIGRAM(S): at 13:36

## 2018-09-11 RX ADMIN — HEPARIN SODIUM 5000 UNIT(S): 5000 INJECTION INTRAVENOUS; SUBCUTANEOUS at 05:34

## 2018-09-11 RX ADMIN — HEPARIN SODIUM 5000 UNIT(S): 5000 INJECTION INTRAVENOUS; SUBCUTANEOUS at 13:36

## 2018-09-11 RX ADMIN — Medication 100 MILLIGRAM(S): at 05:33

## 2018-09-11 RX ADMIN — Medication 1 GRAM(S): at 21:28

## 2018-09-11 RX ADMIN — Medication 81 MILLIGRAM(S): at 11:34

## 2018-09-11 RX ADMIN — Medication 40 MILLIEQUIVALENT(S): at 11:34

## 2018-09-11 NOTE — PROGRESS NOTE ADULT - PROBLEM SELECTOR PLAN 1
-admit to telemetry  -muscle weakness from deconditioning  -episodes of presyncope, transient slurred speech and multiple falls w/ LE weakness  -Orthostatics negative; recent echo 5/2018 without significant findings  -physical therapy evaluation: likely [facility] -admit to telemetry  -muscle weakness from deconditioning  -episodes of presyncope, transient slurred speech and multiple falls w/ LE weakness  -Orthostatics were negative; recent echo 5/2018 without significant findings  -physical therapy evaluation: likely [facility] -admit to telemetry  -muscle weakness from deconditioning  -episodes of presyncope, transient slurred speech and multiple falls w/ LE weakness  -Orthostatics were negative; recent echo 5/2018 without significant findings  -physical therapy evaluation: likely [facility]  -MRI ordered, carotid doppler ordered  -Changed hold parameters of Amlodipine for <140 SBP -admit to telemetry  -muscle weakness from deconditioning  -episodes of presyncope, transient slurred speech and multiple falls w/ LE weakness  -Orthostatics were negative; recent echo 5/2018 without significant findings  -physical therapy evaluation: likely [facility]  -Echocardiogram 5/24/18 with normal LV systolic function, no significant valve issues  -Changed hold parameters of Amlodipine for <140 SBP  -f/u MRI ordered  -f/u carotid doppler  -Neurology consulted - Dr. Floyd

## 2018-09-11 NOTE — PROGRESS NOTE ADULT - SUBJECTIVE AND OBJECTIVE BOX
Neurology follow up note    ANI [id number]      Interval History:    Patient feels ok no new complaints events noted yesterday     MEDICATIONS    ALPRAZolam 1 milliGRAM(s) Oral three times a day PRN  amLODIPine   Tablet 5 milliGRAM(s) Oral daily  aspirin enteric coated 81 milliGRAM(s) Oral daily  atorvastatin 10 milliGRAM(s) Oral at bedtime  buPROPion XL . 150 milliGRAM(s) Oral daily  docusate sodium 100 milliGRAM(s) Oral three times a day  heparin  Injectable 5000 Unit(s) SubCutaneous every 8 hours  hydrALAZINE 25 milliGRAM(s) Oral every 6 hours PRN  pantoprazole    Tablet 40 milliGRAM(s) Oral before breakfast  senna 2 Tablet(s) Oral at bedtime PRN  sucralfate 1 Gram(s) Oral <User Schedule>  tamsulosin 0.4 milliGRAM(s) Oral at bedtime      Allergies    No Known Allergies    Intolerances            Vital Signs Last 24 Hrs  T(C): 36.6 (11 Sep 2018 04:59), Max: 36.7 (10 Sep 2018 21:16)  T(F): 97.8 (11 Sep 2018 04:59), Max: 98 (10 Sep 2018 21:16)  HR: 92 (11 Sep 2018 04:59) (73 - 98)  BP: 99/62 (11 Sep 2018 04:59) (99/62 - 212/119)  BP(mean): --  RR: 16 (11 Sep 2018 04:59) (14 - 18)  SpO2: 96% (11 Sep 2018 04:59) (95% - 99%)      REVIEW OF SYSTEMS:  Limited or unable to obtain secondary to patient's poor mental status.    Constitutional: No fever, chills, fatigue, weakness  Eyes: no eye pain, visual disturbances, or discharge  ENT:  No difficulty hearing, tinnitus, vertigo; No sinus or throat pain  Neck: No pain or stiffness  Respiratory: No cough, dyspnea, wheezing   Cardiovascular: No chest pain, palpitations,   Gastrointestinal: No abdominal or epigastric pain. No nausea, vomiting  No diarrhea or constipation.   Genitourinary: No dysuria, frequency, hematuria or incontinence  Neurological: No headaches, lightheadedness, vertigo, numbness or tremors  Psychiatric: No depression, anxiety, mood swings or difficulty sleeping  Musculoskeletal: No joint pain or swelling; No muscle, back or extremity pain  Skin: No itching, burning, rashes or lesions   Lymph Nodes: No enlarged glands  Endocrine: No heat or cold intolerance; No hair loss   Allergy and Immunologic: No hives or eczema    On Neurological Examination:    The patient is awake and alert.      Extraocular movements were intact.  Pupils were equal, round, and reactive bilaterally 2 mm to 2 mm.      Positive slight forgetfulness was noted.  Speech was fluent.  Smile was symmetric.     Motor:  Bilateral upper were 4/5, bilateral lower were 4-/5, would say age-appropriate.    Follow simple commands  Follow complex commands    GENERAL Exam: Nontoxic , No Acute Distress   	  HEENT:  normocephalic, atraumatic  		  LUNGS: Clear bilaterally    	  HEART: Normal S1S2   No murmur RRR        	  GI/ ABDOMEN:  Soft  Non tender    EXTREMITIES:   No Edema  No Clubbing  No Cyanosis     MUSCULOSKELETAL: Normal Range of Motion     SKIN: Normal  No Ecchymosis               LABS:  CBC Full  -  ( 11 Sep 2018 08:49 )  WBC Count : 5.01 K/uL  Hemoglobin : 12.3 g/dL  Hematocrit : 36.7 %  Platelet Count - Automated : 167 K/uL  Mean Cell Volume : 90.2 fl  Mean Cell Hemoglobin : 30.2 pg  Mean Cell Hemoglobin Concentration : 33.5 gm/dL  Auto Neutrophil # : 3.51 K/uL  Auto Lymphocyte # : 0.78 K/uL  Auto Monocyte # : 0.48 K/uL  Auto Eosinophil # : 0.18 K/uL  Auto Basophil # : 0.04 K/uL  Auto Neutrophil % : 70.0 %  Auto Lymphocyte % : 15.6 %  Auto Monocyte % : 9.6 %  Auto Eosinophil % : 3.6 %  Auto Basophil % : 0.8 %    Urinalysis Basic - ( 10 Sep 2018 11:25 )    Color: Yellow / Appearance: Clear / S.015 / pH: x  Gluc: x / Ketone: Negative  / Bili: Negative / Urobili: Negative   Blood: x / Protein: Negative / Nitrite: Negative   Leuk Esterase: Negative / RBC: x / WBC x   Sq Epi: x / Non Sq Epi: x / Bacteria: x          146<H>  |  112<H>  |  24<H>  ----------------------------<  94  3.5   |  25  |  1.20    Ca    8.7      11 Sep 2018 08:49    TPro  6.1  /  Alb  3.2<L>  /  TBili  0.5  /  DBili  x   /  AST  19  /  ALT  25  /  AlkPhos  71  09-10    Hemoglobin A1C:     LIVER FUNCTIONS - ( 10 Sep 2018 10:12 )  Alb: 3.2 g/dL / Pro: 6.1 g/dL / ALK PHOS: 71 U/L / ALT: 25 U/L / AST: 19 U/L / GGT: x           Vitamin B12   PT/INR - ( 10 Sep 2018 10:12 )   PT: 11.9 sec;   INR: 1.09 ratio         PTT - ( 10 Sep 2018 10:12 )  PTT:30.7 sec      RADIOLOGY      ANALYSIS AND PLAN:  This is an 88-year-old with an episode of lightheadedness, ataxia, dementia, and fall.  1.	For lightheadedness, suspect this could be possibly secondary to cerebral hypoperfusion, possibly secondary to a history of poor oral intake, subtle signs of dehydration, questionable this could be medication induced from chronic use of Antivert. Now events noted with fluctuations  of BP will check mri head questionable HTN urgency type of picture  2.	I would recommend telemetry evaluation.  3.	Monitor systolic blood pressure.  4.	Check orthostatics.  5.	For history of mild cognitive impairment versus subtle dementia, for now, I would recommend supportive therapy.  6.	I would recommend fall precautions.  7.	Questionable this could be TIA-like symptoms changes in bp.  I would recommend to continue the patient on his home medications of aspirin and statin for now.  8.	For history of depression, anxiety, continue the patient on Buspirone.  9.	For history of high cholesterol, continue the patient on a statin.  10.	Spoke with the spouse, Beverly, at  [phone]    Physical therapy evaluation as tolerated  OOB to chair/ambulation with assistance only if possible.    Greater than 45 minutes spent in direct patient care reviewing  the notes, lab data/ imaging , discussion with multidisciplinary team.

## 2018-09-11 NOTE — PROGRESS NOTE ADULT - PROBLEM SELECTOR PLAN 7
on terazosin 10mg at home for BPH  on tolterodine ER 4mg qhs at home for overactive bladder  will d/c terazosin and tolterodine, which can contribute to orthostasis  switch to flomax 04mg qhs

## 2018-09-11 NOTE — PROGRESS NOTE ADULT - PROBLEM SELECTOR PLAN 2
-Physical therapy evaluation  -Will likely need [facility] -Physical therapy evaluation  -Will likely need [facility]  -will follow up with social work

## 2018-09-11 NOTE — PROGRESS NOTE ADULT - PROBLEM SELECTOR PLAN 3
likely from cerebral hypoperfusion; CT negative for acute pathology  mild QTc prolongation on EKG, without other predisposing features for syncope  will hold meclizine per neurology recommendations  monitor for arrhythmia on telemetry  check orthostatic vitals  neurology Dr. Yu  cardiology Dr. Christ Carter

## 2018-09-11 NOTE — PROGRESS NOTE ADULT - PROBLEM SELECTOR PLAN 7
on terazosin 10mg at home for BPH  -on tolterodine ER 4mg qhs at home for overactive bladder  -will d/c terazosin and tolterodine, which can contribute to orthostasis  -switch to flomax 04mg qhs -on terazosin 10mg at home for BPH  -on tolterodine ER 4mg qhs at home for overactive bladder  -will d/c terazosin and tolterodine, which can contribute to orthostasis  -switch to flomax 04mg qhs

## 2018-09-11 NOTE — PROGRESS NOTE ADULT - SUBJECTIVE AND OBJECTIVE BOX
Patient is a 88y old  Male who presents with a chief complaint of weakness (11 Sep 2018 07:19)    HPI:  89 y/o M with PMHx of BPH, TIA, anxiety, duodenal ulcer, multiple falls presents from home with weakness and pre-syncope. States that he has been dizzy for quite some time and was put on meclizine, which has not been giving him relief. Saw a female neurologist on Friday, as he "thought he had a brain tumor." Neurology told the patient that meclizine may have been worsening his symptoms. States that his dizziness is worse in the morning upon getting up from bed. Lives with his wife. States that he was ambulating with his walker and ended up seated on the floor. Was unable to pull himself up and his wife was unable to get him up so she called EMS. States that he was recently in rehab about 5 weeks ago. States that he had been walking 2-3 blocks as recently as last week but had since not done so the last few days. States that he still took his meclizine the last two days. Had some slurring of his speech, which has resolved. He has had similar presentations with falls over the past few weeks with weakness in his legs and slurring of speech. Currently still admitting to dizziness. Of note, has long standing anxiety for which he takes xanax 2-3 times a day, standing. Was taking it TID for a long time and recently tried dropping down to BID, with no withdrawal symptoms. States that he had withdrawal from stopping xanax for 5 days at one time when he ran out of his prescription, this event resolved after taking xanax. Denies CP, palpitations, dyspnea, FINE, PND, abdominal pain, n/v/d, dysuria or hematuria.    In the ED, VSS. Labs significant for H/H 12.2/36.2, BUN/Cr of 32/1.50. UA negative. CT head negative. CXR no acute pulmonary process, large hiatal hernia. XR Right Hip/Pelvis, Right Femur, Left Hand were negative for acute dislocation or fracture. EKG: NSR, normal axis, no ST abnormality. (10 Sep 2018 14:05)      INTERVAL HPI:   Patient seen and examined at bedside. Patient's wife was present at the time, and states that the patient is back to his baseline. Pt is complaining of constipation and increased frequency of urination, but is otherwise feeling well. Denies any dizziness, headaches, confusion, slurred speech, or RUE and RLE weakness. No CP, palpitations, SOB, or dyspnea. No focal deficits.    OVERNIGHT EVENTS:   Code Stroke was called for slurred speech and right sided weakness last night, which resolved within minutes. No focal deficits. Stat head CT was negative. Prior to this, the patient was given 20 mg Hydralazine for elevated BP in the 180s.            MEDICATIONS  (STANDING):  amLODIPine   Tablet 5 milliGRAM(s) Oral daily  aspirin enteric coated 81 milliGRAM(s) Oral daily  atorvastatin 10 milliGRAM(s) Oral at bedtime  buPROPion XL . 150 milliGRAM(s) Oral daily  docusate sodium 100 milliGRAM(s) Oral three times a day  heparin  Injectable 5000 Unit(s) SubCutaneous every 8 hours  pantoprazole    Tablet 40 milliGRAM(s) Oral before breakfast  potassium chloride    Tablet ER 40 milliEquivalent(s) Oral once  sucralfate 1 Gram(s) Oral <User Schedule>  tamsulosin 0.4 milliGRAM(s) Oral at bedtime    MEDICATIONS  (PRN):  ALPRAZolam 1 milliGRAM(s) Oral three times a day PRN anxiety  hydrALAZINE 25 milliGRAM(s) Oral every 6 hours PRN Systolic blood pressure >180  senna 2 Tablet(s) Oral at bedtime PRN Constipation      Allergies    No Known Allergies    Intolerances        REVIEW OF SYSTEMS:  CONSTITUTIONAL: No fever, No chills, No fatigue  EYES: No eye pain, visual disturbances, or discharge  ENMT:  No ear pain, No nose bleed, No vertigo; No sinus or throat pain, No Congestion  NECK: No pain, No stiffness  RESPIRATORY: No cough, wheezing, No hemoptysis, No shortness of breath  CARDIOVASCULAR: No chest pain, palpitations  GASTROINTESTINAL: No abdominal or epigastric pain. No nausea, No vomiting; + Constipation  GENITOURINARY: No dysuria, No frequency, No urgency, No hematuria, or incontinence  NEUROLOGICAL: No headaches, No dizziness, No numbness, No tingling, No tremors, No weakness  EXT: No Swelling, No Pain, No Edema  SKIN:  No itching, burning, rashes, or lesions   MUSCULOSKELETAL: No joint pain or swelling; No muscle pain, No back pain, No extremity pain  PSYCHIATRIC: No depression, anxiety, mood swings or difficulty sleeping at night      Vital Signs Last 24 Hrs  T(C): 36.6 (11 Sep 2018 04:59), Max: 36.7 (10 Sep 2018 21:16)  T(F): 97.8 (11 Sep 2018 04:59), Max: 98 (10 Sep 2018 21:16)  HR: 92 (11 Sep 2018 04:59) (73 - 98)  BP: 99/62 (11 Sep 2018 04:59) (99/62 - 212/119)  BP(mean): --  RR: 16 (11 Sep 2018 04:59) (14 - 18)  SpO2: 96% (11 Sep 2018 04:59) (95% - 99%)  Finger Stick  112 (10 Sep 2018 23:17)        09-10 @ 07:01  -  -11 @ 07:00  --------------------------------------------------------  IN: 0 mL / OUT: 1000 mL / NET: -1000 mL        PHYSICAL EXAM:  GENERAL:  NAD,  well appearing  HEAD:  Normocephalic, atraumatic  EYES:  EOMI, PERRLA, conjunctiva and sclera clear normal  ENMT: Moist mucous membranes, good dentition  NECK: Supple; No JVD, Normal thyroid, no Lymphadenopathy  NERVOUS SYSTEM: Alert & Oriented   CHEST/LUNG: clear to auscultation bilaterally, No rales, No rhonchi, No wheezing  HEART:  Regular rate and rhythm, No murmurs, rubs, or gallops  ABDOMEN: Soft, Nontender, Nondistended, Bowel sounds present  EXTREMITIES: 2+ Peripheral Pulses, No clubbing, cyanosis, or edema  LYMPH: No lymphadenopathy noted  SKIN:  No rashes or lesions      LABS:                        12.3   5.01  )-----------( 167      ( 11 Sep 2018 08:49 )             36.7     11 Sep 2018 08:49    146    |  112    |  24     ----------------------------<  94     3.5     |  25     |  1.20     Ca    8.7        11 Sep 2018 08:49    TPro  6.1    /  Alb  3.2    /  TBili  0.5    /  DBili  x      /  AST  19     /  ALT  25     /  AlkPhos  71     10 Sep 2018 10:12    PT/INR - ( 10 Sep 2018 10:12 )   PT: 11.9 sec;   INR: 1.09 ratio         PTT - ( 10 Sep 2018 10:12 )  PTT:30.7 sec  Urinalysis Basic - ( 10 Sep 2018 11:25 )    Color: Yellow / Appearance: Clear / S.015 / pH: x  Gluc: x / Ketone: Negative  / Bili: Negative / Urobili: Negative   Blood: x / Protein: Negative / Nitrite: Negative   Leuk Esterase: Negative / RBC: x / WBC x   Sq Epi: x / Non Sq Epi: x / Bacteria: x          RADIOLOGY & ADDITIONAL TESTS:      HEALTH ISSUES - PROBLEM Dx:  Need for prophylactic measure: Need for prophylactic measure  Duodenal ulcer: Duodenal ulcer  BPH (benign prostatic hyperplasia): BPH (benign prostatic hyperplasia)  Anxiety: Anxiety  TIA (transient ischemic attack): TIA (transient ischemic attack)  CKD (chronic kidney disease), stage III: CKD (chronic kidney disease), stage III  Pre-syncope: Pre-syncope  Unable to ambulate: Unable to ambulate  Weakness: Weakness          Consultant(s) Notes Reviewed:  [  ] YES     Care Discussed with [X] Consultants  [  ] Patient  [  ] Family  [  ]   [  ] Social Service  [  ] RN, [  ] Physical Therapy  DVT PPX: [  ] Lovenox, [  ] S C Heparin, [  ] Coumadin, [  ] Xarelto, [  ] Eliquis, [  ] SCD   Advanced directive: [  ] None, [  ] DNR/DNI Patient is a 88y old  Male who presents with a chief complaint of weakness (11 Sep 2018 07:19)    HPI:  89 y/o M with PMHx of BPH, TIA, anxiety, duodenal ulcer, multiple falls presents from home with weakness and pre-syncope. States that he has been dizzy for quite some time and was put on meclizine, which has not been giving him relief. Saw a female neurologist on Friday, as he "thought he had a brain tumor." Neurology told the patient that meclizine may have been worsening his symptoms. States that his dizziness is worse in the morning upon getting up from bed. Lives with his wife. States that he was ambulating with his walker and ended up seated on the floor. Was unable to pull himself up and his wife was unable to get him up so she called EMS. States that he was recently in rehab about 5 weeks ago. States that he had been walking 2-3 blocks as recently as last week but had since not done so the last few days. States that he still took his meclizine the last two days. Had some slurring of his speech, which has resolved. He has had similar presentations with falls over the past few weeks with weakness in his legs and slurring of speech. Currently still admitting to dizziness. Of note, has long standing anxiety for which he takes xanax 2-3 times a day, standing. Was taking it TID for a long time and recently tried dropping down to BID, with no withdrawal symptoms. States that he had withdrawal from stopping xanax for 5 days at one time when he ran out of his prescription, this event resolved after taking xanax. Denies CP, palpitations, dyspnea, FINE, PND, abdominal pain, n/v/d, dysuria or hematuria.    In the ED, VSS. Labs significant for H/H 12.2/36.2, BUN/Cr of 32/1.50. UA negative. CT head negative. CXR no acute pulmonary process, large hiatal hernia. XR Right Hip/Pelvis, Right Femur, Left Hand were negative for acute dislocation or fracture. EKG: NSR, normal axis, no ST abnormality. (10 Sep 2018 14:05)      INTERVAL HPI:   Patient seen and examined at bedside. Patient's wife was present at the time, and states that the patient is back to his baseline. Pt is complaining of constipation and increased frequency of urination, but is otherwise feeling well. Denies any dizziness or symptoms of vertigo, headaches, confusion, slurred speech, or RUE and RLE weakness. No CP, palpitations, SOB, or dyspnea. No focal deficits.    OVERNIGHT EVENTS:   Code Stroke was called for slurred speech and right sided weakness last night, which resolved within minutes. No focal deficits. Stat head CT was negative. Prior to this, the patient was given 20 mg Hydralazine for elevated BP in the 180s.            MEDICATIONS  (STANDING):  amLODIPine   Tablet 5 milliGRAM(s) Oral daily  aspirin enteric coated 81 milliGRAM(s) Oral daily  atorvastatin 10 milliGRAM(s) Oral at bedtime  buPROPion XL . 150 milliGRAM(s) Oral daily  docusate sodium 100 milliGRAM(s) Oral three times a day  heparin  Injectable 5000 Unit(s) SubCutaneous every 8 hours  pantoprazole    Tablet 40 milliGRAM(s) Oral before breakfast  potassium chloride    Tablet ER 40 milliEquivalent(s) Oral once  sucralfate 1 Gram(s) Oral <User Schedule>  tamsulosin 0.4 milliGRAM(s) Oral at bedtime    MEDICATIONS  (PRN):  ALPRAZolam 1 milliGRAM(s) Oral three times a day PRN anxiety  hydrALAZINE 25 milliGRAM(s) Oral every 6 hours PRN Systolic blood pressure >180  senna 2 Tablet(s) Oral at bedtime PRN Constipation      Allergies    No Known Allergies    Intolerances        REVIEW OF SYSTEMS:  CONSTITUTIONAL: No fever, No chills, No fatigue  EYES: No eye pain, visual disturbances, or discharge  ENMT:  No ear pain, No nose bleed, No vertigo; No sinus or throat pain, No Congestion  NECK: No pain, No stiffness  RESPIRATORY: No cough, wheezing, No hemoptysis, No shortness of breath  CARDIOVASCULAR: No chest pain, palpitations  GASTROINTESTINAL: No abdominal or epigastric pain. No nausea, No vomiting; + Constipation  GENITOURINARY: No dysuria, No frequency, No urgency, No hematuria, or incontinence  NEUROLOGICAL: No headaches, No dizziness, No numbness, No tingling, No tremors, No weakness  EXT: No Swelling, No Pain, No Edema  SKIN:  No itching, burning, rashes, or lesions   MUSCULOSKELETAL: No joint pain or swelling; No muscle pain, No back pain, No extremity pain  PSYCHIATRIC: No depression, anxiety, mood swings or difficulty sleeping at night      Vital Signs Last 24 Hrs  T(C): 36.6 (11 Sep 2018 04:59), Max: 36.7 (10 Sep 2018 21:16)  T(F): 97.8 (11 Sep 2018 04:59), Max: 98 (10 Sep 2018 21:16)  HR: 92 (11 Sep 2018 04:59) (73 - 98)  BP: 99/62 (11 Sep 2018 04:59) (99/62 - 212/119)  BP(mean): --  RR: 16 (11 Sep 2018 04:59) (14 - 18)  SpO2: 96% (11 Sep 2018 04:59) (95% - 99%)  Finger Stick  112 (10 Sep 2018 23:17)        09-10 @ 07:01  -  -11 @ 07:00  --------------------------------------------------------  IN: 0 mL / OUT: 1000 mL / NET: -1000 mL        PHYSICAL EXAM:  GENERAL:  NAD,  well appearing  HEAD:  Normocephalic, atraumatic  EYES:  EOMI, PERRLA, conjunctiva and sclera clear normal  ENMT: Moist mucous membranes, good dentition  NECK: Supple; No JVD, Normal thyroid, no Lymphadenopathy  NERVOUS SYSTEM: Alert & Oriented   CHEST/LUNG: clear to auscultation bilaterally, No rales, No rhonchi, No wheezing  HEART:  Regular rate and rhythm, No murmurs, rubs, or gallops  ABDOMEN: Soft, Nontender, Nondistended, Bowel sounds present  EXTREMITIES: 2+ Peripheral Pulses, No clubbing, cyanosis, or edema  LYMPH: No lymphadenopathy noted  SKIN:  No rashes or lesions      LABS:                        12.3   5.01  )-----------( 167      ( 11 Sep 2018 08:49 )             36.7     11 Sep 2018 08:49    146    |  112    |  24     ----------------------------<  94     3.5     |  25     |  1.20     Ca    8.7        11 Sep 2018 08:49    TPro  6.1    /  Alb  3.2    /  TBili  0.5    /  DBili  x      /  AST  19     /  ALT  25     /  AlkPhos  71     10 Sep 2018 10:12    PT/INR - ( 10 Sep 2018 10:12 )   PT: 11.9 sec;   INR: 1.09 ratio         PTT - ( 10 Sep 2018 10:12 )  PTT:30.7 sec  Urinalysis Basic - ( 10 Sep 2018 11:25 )    Color: Yellow / Appearance: Clear / S.015 / pH: x  Gluc: x / Ketone: Negative  / Bili: Negative / Urobili: Negative   Blood: x / Protein: Negative / Nitrite: Negative   Leuk Esterase: Negative / RBC: x / WBC x   Sq Epi: x / Non Sq Epi: x / Bacteria: x          RADIOLOGY & ADDITIONAL TESTS:      HEALTH ISSUES - PROBLEM Dx:  Need for prophylactic measure: Need for prophylactic measure  Duodenal ulcer: Duodenal ulcer  BPH (benign prostatic hyperplasia): BPH (benign prostatic hyperplasia)  Anxiety: Anxiety  TIA (transient ischemic attack): TIA (transient ischemic attack)  CKD (chronic kidney disease), stage III: CKD (chronic kidney disease), stage III  Pre-syncope: Pre-syncope  Unable to ambulate: Unable to ambulate  Weakness: Weakness          Consultant(s) Notes Reviewed:  [  ] YES     Care Discussed with [X] Consultants  [  ] Patient  [  ] Family  [  ]   [  ] Social Service  [  ] RN, [  ] Physical Therapy  DVT PPX: [  ] Lovenox, [  ] S C Heparin, [  ] Coumadin, [  ] Xarelto, [  ] Eliquis, [  ] SCD   Advanced directive: [  ] None, [  ] DNR/DNI Patient is a 88y old  Male who presents with a chief complaint of weakness (11 Sep 2018 07:19)    HPI:  89 y/o M with PMHx of BPH, TIA, anxiety, duodenal ulcer, multiple falls presents from home with weakness and pre-syncope. States that he has been dizzy for quite some time and was put on meclizine, which has not been giving him relief. Saw a female neurologist on Friday, as he "thought he had a brain tumor." Neurology told the patient that meclizine may have been worsening his symptoms. States that his dizziness is worse in the morning upon getting up from bed. Lives with his wife. States that he was ambulating with his walker and ended up seated on the floor. Was unable to pull himself up and his wife was unable to get him up so she called EMS. States that he was recently in rehab about 5 weeks ago. States that he had been walking 2-3 blocks as recently as last week but had since not done so the last few days. States that he still took his meclizine the last two days. Had some slurring of his speech, which has resolved. He has had similar presentations with falls over the past few weeks with weakness in his legs and slurring of speech. Currently still admitting to dizziness. Of note, has long standing anxiety for which he takes xanax 2-3 times a day, standing. Was taking it TID for a long time and recently tried dropping down to BID, with no withdrawal symptoms. States that he had withdrawal from stopping xanax for 5 days at one time when he ran out of his prescription, this event resolved after taking xanax. Denies CP, palpitations, dyspnea, FINE, PND, abdominal pain, n/v/d, dysuria or hematuria.    In the ED, VSS. Labs significant for H/H 12.2/36.2, BUN/Cr of 32/1.50. UA negative. CT head negative. CXR no acute pulmonary process, large hiatal hernia. XR Right Hip/Pelvis, Right Femur, Left Hand were negative for acute dislocation or fracture. EKG: NSR, normal axis, no ST abnormality. (10 Sep 2018 14:05)      INTERVAL HPI:   Patient seen and examined at bedside. Patient's wife was present at the time, and states that the patient is back to his baseline. Pt is complaining of constipation and increased frequency of urination, but is otherwise feeling well. Denies any dizziness or symptoms of vertigo, headaches, confusion, slurred speech, or RUE and RLE weakness. No CP, palpitations, SOB, or dyspnea. No focal deficits.    OVERNIGHT EVENTS:   Code Stroke was called for slurred speech and right sided weakness last night, which resolved within minutes. No focal deficits. Stat head CT was negative. Prior to this, the patient was given 20 mg Hydralazine for elevated BP in the 180s.            MEDICATIONS  (STANDING):  amLODIPine   Tablet 5 milliGRAM(s) Oral daily  aspirin enteric coated 81 milliGRAM(s) Oral daily  atorvastatin 10 milliGRAM(s) Oral at bedtime  buPROPion XL . 150 milliGRAM(s) Oral daily  docusate sodium 100 milliGRAM(s) Oral three times a day  heparin  Injectable 5000 Unit(s) SubCutaneous every 8 hours  pantoprazole    Tablet 40 milliGRAM(s) Oral before breakfast  potassium chloride    Tablet ER 40 milliEquivalent(s) Oral once  sucralfate 1 Gram(s) Oral <User Schedule>  tamsulosin 0.4 milliGRAM(s) Oral at bedtime    MEDICATIONS  (PRN):  ALPRAZolam 1 milliGRAM(s) Oral three times a day PRN anxiety  hydrALAZINE 25 milliGRAM(s) Oral every 6 hours PRN Systolic blood pressure >180  senna 2 Tablet(s) Oral at bedtime PRN Constipation      Allergies    No Known Allergies    Intolerances        REVIEW OF SYSTEMS:  CONSTITUTIONAL: No fever, No chills, No fatigue  EYES: No eye pain, visual disturbances, or discharge  ENMT:  No ear pain, No nose bleed, No vertigo; No sinus or throat pain, No Congestion  NECK: No pain, No stiffness  RESPIRATORY: No cough, wheezing, No hemoptysis, No shortness of breath  CARDIOVASCULAR: No chest pain, palpitations  GASTROINTESTINAL: No abdominal or epigastric pain. No nausea, No vomiting; + Constipation  GENITOURINARY: No dysuria, No frequency, No urgency, No hematuria, or incontinence  NEUROLOGICAL: No headaches, No dizziness, No numbness, No tingling, No tremors, No weakness  EXT: No Swelling, No Pain, No Edema  SKIN:  No itching, burning, rashes, or lesions   MUSCULOSKELETAL: No joint pain or swelling; No muscle pain, No back pain, No extremity pain  PSYCHIATRIC: No depression, anxiety, mood swings or difficulty sleeping at night      Vital Signs Last 24 Hrs  T(C): 36.6 (11 Sep 2018 04:59), Max: 36.7 (10 Sep 2018 21:16)  T(F): 97.8 (11 Sep 2018 04:59), Max: 98 (10 Sep 2018 21:16)  HR: 92 (11 Sep 2018 04:59) (73 - 98)  BP: 99/62 (11 Sep 2018 04:59) (99/62 - 212/119)  BP(mean): --  RR: 16 (11 Sep 2018 04:59) (14 - 18)  SpO2: 96% (11 Sep 2018 04:59) (95% - 99%)  Finger Stick  112 (10 Sep 2018 23:17)        09-10 @ 07:01  -  -11 @ 07:00  --------------------------------------------------------  IN: 0 mL / OUT: 1000 mL / NET: -1000 mL        PHYSICAL EXAM:  GENERAL:  NAD,  well appearing  HEAD:  Normocephalic, atraumatic  EYES:  EOMI, PERRLA, conjunctiva and sclera clear normal  ENMT: Moist mucous membranes, good dentition  NECK: Supple; No JVD, Normal thyroid, no Lymphadenopathy  NERVOUS SYSTEM: Alert & Oriented   CHEST/LUNG: clear to auscultation bilaterally, No rales, No rhonchi, No wheezing  HEART:  Regular rate and rhythm, No murmurs, rubs, or gallops  ABDOMEN: Soft, Nontender, Nondistended, Bowel sounds present  EXTREMITIES: 2+ Peripheral Pulses, No clubbing, cyanosis, or edema  LYMPH: No lymphadenopathy noted  SKIN:  No rashes or lesions      LABS:                        12.3   5.01  )-----------( 167      ( 11 Sep 2018 08:49 )             36.7     11 Sep 2018 08:49    146    |  112    |  24     ----------------------------<  94     3.5     |  25     |  1.20     Ca    8.7        11 Sep 2018 08:49    TPro  6.1    /  Alb  3.2    /  TBili  0.5    /  DBili  x      /  AST  19     /  ALT  25     /  AlkPhos  71     10 Sep 2018 10:12    PT/INR - ( 10 Sep 2018 10:12 )   PT: 11.9 sec;   INR: 1.09 ratio         PTT - ( 10 Sep 2018 10:12 )  PTT:30.7 sec  Urinalysis Basic - ( 10 Sep 2018 11:25 )    Color: Yellow / Appearance: Clear / S.015 / pH: x  Gluc: x / Ketone: Negative  / Bili: Negative / Urobili: Negative   Blood: x / Protein: Negative / Nitrite: Negative   Leuk Esterase: Negative / RBC: x / WBC x   Sq Epi: x / Non Sq Epi: x / Bacteria: x          RADIOLOGY & ADDITIONAL TESTS:    < from: CT Brain Stroke Protocol (09.10.18 @ 23:45) >    EXAM:  CT BRAIN STROKE PROTOCOL                            PROCEDURE DATE:  09/10/2018        INTERPRETATION:  CLINICAL INFORMATION: acute episode of R sided hand   weakness and slurred speech    TECHNIQUE: Noncontrast CT examination of the head was performed using   contiguous 5 mm CT images.    COMPARISON: September 10, 2018      IMPRESSION:     No evidence of acute intracranial hemorrhage, midline shift or CT   evidence of acute territorial infarct.    If the patient's symptoms persist, consider short interval follow-up head   CT or brain MRI if there are no MRI contraindications.      RAVI FOSTER M.D., ATTENDING RADIOLOGIST  This document has been electronically signed. Sep 10 2018 11:58PM        < from: Xray Chest 1 View AP/PA (09.10.18 @ 13:01) >    EXAM:  XR CHEST AP OR PA 1V                            PROCEDURE DATE:  09/10/2018      INTERPRETATION:  History: Weakness      Comparisons:  CT abdomen dated 2018 and chest x-ray      Impression: No acute pulmonary disease. Large hiatal hernia      KEN SPEARS M.D., ATTENDING RADIOLOGIST  This document has been electronically signed. Sep 10 2018  1:12PM          < from: CT Head No Cont (09.10.18 @ 10:39) >    EXAM:  CT BRAIN                            PROCEDURE DATE:  09/10/2018          INTERPRETATION:  EXAM: CT HEAD WITHOUT CONTRAST.     CLINICAL INDICATION: Slurred speech.     TECHNIQUE: Noncontrast imaging was performed. Axial, sagittal and   coronal scans are reviewed.     PRIOR EXAM: 2018.     FINDINGS:      Ventricles and cortical sulci are unremarkable for patient's age. Mild   periventricular height matter low density is suggestive of chronic small   vessel ischemic change. There is no evidence of any mass effect or   extra-axial collection. No abnormal intracranial calcifications are seen.    Bony calvarium, visualized orbits are unremarkable. There is soft tissue   density within the tip of right mastoid. Minimal ethmoid and maxillary   sinus disease is seen, chronic.      IMPRESSION:     No CT evidence of any acute intracranial abnormality.        < end of copied text >  < from: CT Head No Cont (09.10.18 @ 10:39) >    EXAM:  CT BRAIN                            PROCEDURE DATE:  09/10/2018          INTERPRETATION:  EXAM: CT HEAD WITHOUT CONTRAST.     CLINICAL INDICATION: Slurred speech.     TECHNIQUE: Noncontrast imaging was performed. Axial, sagittal and   coronal scans are reviewed.     PRIOR EXAM: 2018.     FINDINGS:      Ventricles and cortical sulci are unremarkable for patient's age. Mild   periventricular height matter low density is suggestive of chronic small   vessel ischemic change. There is no evidence of any mass effect or   extra-axial collection. No abnormal intracranial calcifications are seen.    Bony calvarium, visualized orbits are unremarkable. There is soft tissue   density within the tip of right mastoid. Minimal ethmoid and maxillary   sinus disease is seen, chronic.      IMPRESSION:     No CT evidence of any acute intracranial abnormality.        < end of copied text >  < from: CT Head No Cont (09.10.18 @ 10:39) >      EXAM:  CT BRAIN                          PROCEDURE DATE:  09/10/2018      INTERPRETATION:  EXAM: CT HEAD WITHOUT CONTRAST.     CLINICAL INDICATION: Slurred speech.     TECHNIQUE: Noncontrast imaging was performed. Axial, sagittal and   coronal scans are reviewed.     PRIOR EXAM: 2018.    IMPRESSION:     No CT evidence of any acute intracranial abnormality.        < end of copied text >        HEALTH ISSUES - PROBLEM Dx:  Need for prophylactic measure: Need for prophylactic measure  Duodenal ulcer: Duodenal ulcer  BPH (benign prostatic hyperplasia): BPH (benign prostatic hyperplasia)  Anxiety: Anxiety  TIA (transient ischemic attack): TIA (transient ischemic attack)  CKD (chronic kidney disease), stage III: CKD (chronic kidney disease), stage III  Pre-syncope: Pre-syncope  Unable to ambulate: Unable to ambulate  Weakness: Weakness          Consultant(s) Notes Reviewed:  [  ] YES     Care Discussed with [X] Consultants  [  ] Patient  [  ] Family  [  ]   [  ] Social Service  [  ] RN, [  ] Physical Therapy  DVT PPX: [  ] Lovenox, [  ] S C Heparin, [  ] Coumadin, [  ] Xarelto, [  ] Eliquis, [  ] SCD   Advanced directive: [  ] None, [  ] DNR/DNI Patient is a 88y old  Male who presents with a chief complaint of weakness (11 Sep 2018 07:19)    HPI:  87 y/o M with PMHx of BPH, TIA, anxiety, duodenal ulcer, multiple falls presents from home with weakness and pre-syncope. States that he has been dizzy for quite some time and was put on meclizine, which has not been giving him relief. Saw a female neurologist on Friday, as he "thought he had a brain tumor." Neurology told the patient that meclizine may have been worsening his symptoms. States that his dizziness is worse in the morning upon getting up from bed. Lives with his wife. States that he was ambulating with his walker and ended up seated on the floor. Was unable to pull himself up and his wife was unable to get him up so she called EMS. States that he was recently in rehab about 5 weeks ago. States that he had been walking 2-3 blocks as recently as last week but had since not done so the last few days. States that he still took his meclizine the last two days. Had some slurring of his speech, which has resolved. He has had similar presentations with falls over the past few weeks with weakness in his legs and slurring of speech. Currently still admitting to dizziness. Of note, has long standing anxiety for which he takes xanax 2-3 times a day, standing. Was taking it TID for a long time and recently tried dropping down to BID, with no withdrawal symptoms. States that he had withdrawal from stopping xanax for 5 days at one time when he ran out of his prescription, this event resolved after taking xanax. Denies CP, palpitations, dyspnea, FINE, PND, abdominal pain, n/v/d, dysuria or hematuria.    In the ED, VSS. Labs significant for H/H 12.2/36.2, BUN/Cr of 32/1.50. UA negative. CT head negative. CXR no acute pulmonary process, large hiatal hernia. XR Right Hip/Pelvis, Right Femur, Left Hand were negative for acute dislocation or fracture. EKG: NSR, normal axis, no ST abnormality. (10 Sep 2018 14:05)      INTERVAL HPI:   Patient seen and examined at bedside. Patient's wife was present at the time, and states that the patient is back to his baseline. Pt is complaining of constipation and increased frequency of urination, but is otherwise feeling well. Denies any dizziness or symptoms of vertigo, headaches, confusion, slurred speech, or RUE and RLE weakness. No CP, palpitations, SOB, or dyspnea. No focal deficits.    OVERNIGHT EVENTS:   Code Stroke was called for slurred speech and right sided weakness last night, which resolved within minutes. No focal deficits. Stat head CT was negative. Prior to this, the patient was given 20 mg Hydralazine for elevated BP in the 180s.            MEDICATIONS  (STANDING):  amLODIPine   Tablet 5 milliGRAM(s) Oral daily  aspirin enteric coated 81 milliGRAM(s) Oral daily  atorvastatin 10 milliGRAM(s) Oral at bedtime  buPROPion XL . 150 milliGRAM(s) Oral daily  docusate sodium 100 milliGRAM(s) Oral three times a day  heparin  Injectable 5000 Unit(s) SubCutaneous every 8 hours  pantoprazole    Tablet 40 milliGRAM(s) Oral before breakfast  potassium chloride    Tablet ER 40 milliEquivalent(s) Oral once  sucralfate 1 Gram(s) Oral <User Schedule>  tamsulosin 0.4 milliGRAM(s) Oral at bedtime    MEDICATIONS  (PRN):  ALPRAZolam 1 milliGRAM(s) Oral three times a day PRN anxiety  hydrALAZINE 25 milliGRAM(s) Oral every 6 hours PRN Systolic blood pressure >180  senna 2 Tablet(s) Oral at bedtime PRN Constipation      Allergies    No Known Allergies    Intolerances        REVIEW OF SYSTEMS:  CONSTITUTIONAL: No fever, No chills, No fatigue  EYES: No eye pain, visual disturbances, or discharge  ENMT:  No ear pain, No nose bleed, No vertigo; No sinus or throat pain, No Congestion  NECK: No pain, No stiffness  RESPIRATORY: No cough, wheezing, No hemoptysis, No shortness of breath  CARDIOVASCULAR: No chest pain, palpitations  GASTROINTESTINAL: No abdominal or epigastric pain. No nausea, No vomiting; + Constipation  GENITOURINARY: No dysuria, No frequency, No urgency, No hematuria, or incontinence  NEUROLOGICAL: No headaches, No dizziness, No numbness, No tingling, No tremors, No weakness  EXT: No Swelling, No Pain, No Edema  SKIN:  No itching, burning, rashes, or lesions   MUSCULOSKELETAL: No joint pain or swelling; No muscle pain, No back pain, No extremity pain  PSYCHIATRIC: No depression, anxiety, mood swings or difficulty sleeping at night      Vital Signs Last 24 Hrs  T(C): 36.6 (11 Sep 2018 04:59), Max: 36.7 (10 Sep 2018 21:16)  T(F): 97.8 (11 Sep 2018 04:59), Max: 98 (10 Sep 2018 21:16)  HR: 92 (11 Sep 2018 04:59) (73 - 98)  BP: 99/62 (11 Sep 2018 04:59) (99/62 - 212/119)  BP(mean): --  RR: 16 (11 Sep 2018 04:59) (14 - 18)  SpO2: 96% (11 Sep 2018 04:59) (95% - 99%)  Finger Stick  112 (10 Sep 2018 23:17)        09-10 @ 07:01  -  09-11 @ 07:00  --------------------------------------------------------  IN: 0 mL / OUT: 1000 mL / NET: -1000 mL        PHYSICAL EXAM:  GENERAL:  NAD,  well appearing  HEAD:  Normocephalic, atraumatic  EYES:  EOMI, PERRLA, conjunctiva and sclera clear normal  ENMT: Moist mucous membranes, good dentition  NECK: Supple; No JVD, Normal thyroid, no Lymphadenopathy  NERVOUS SYSTEM: Alert & Oriented; CN II-XII intact b/l; No RUE or RLE weakness  CHEST/LUNG: clear to auscultation bilaterally, No rales, No rhonchi, No wheezing  HEART:  Regular rate and rhythm, No murmurs, rubs, or gallops  ABDOMEN: Soft, Nontender, Nondistended, Bowel sounds present  EXTREMITIES: 2+ Peripheral Pulses, No clubbing, cyanosis, or edema  LYMPH: No lymphadenopathy noted  SKIN:  No rashes or lesions      LABS:                        12.3   5.01  )-----------( 167      ( 11 Sep 2018 08:49 )             36.7     11 Sep 2018 08:49    146    |  112    |  24     ----------------------------<  94     3.5     |  25     |  1.20     Ca    8.7        11 Sep 2018 08:49    TPro  6.1    /  Alb  3.2    /  TBili  0.5    /  DBili  x      /  AST  19     /  ALT  25     /  AlkPhos  71     10 Sep 2018 10:12    PT/INR - ( 10 Sep 2018 10:12 )   PT: 11.9 sec;   INR: 1.09 ratio         PTT - ( 10 Sep 2018 10:12 )  PTT:30.7 sec  Urinalysis Basic - ( 10 Sep 2018 11:25 )    Color: Yellow / Appearance: Clear / S.015 / pH: x  Gluc: x / Ketone: Negative  / Bili: Negative / Urobili: Negative   Blood: x / Protein: Negative / Nitrite: Negative   Leuk Esterase: Negative / RBC: x / WBC x   Sq Epi: x / Non Sq Epi: x / Bacteria: x          RADIOLOGY & ADDITIONAL TESTS:    < from: CT Brain Stroke Protocol (09.10.18 @ 23:45) >    EXAM:  CT BRAIN STROKE PROTOCOL                            PROCEDURE DATE:  09/10/2018        INTERPRETATION:  CLINICAL INFORMATION: acute episode of R sided hand   weakness and slurred speech    TECHNIQUE: Noncontrast CT examination of the head was performed using   contiguous 5 mm CT images.    COMPARISON: September 10, 2018      IMPRESSION:     No evidence of acute intracranial hemorrhage, midline shift or CT   evidence of acute territorial infarct.    If the patient's symptoms persist, consider short interval follow-up head   CT or brain MRI if there are no MRI contraindications.      RAVI FOSTER M.D., ATTENDING RADIOLOGIST  This document has been electronically signed. Sep 10 2018 11:58PM        < from: Xray Chest 1 View AP/PA (09.10.18 @ 13:01) >    EXAM:  XR CHEST AP OR PA 1V                            PROCEDURE DATE:  09/10/2018      INTERPRETATION:  History: Weakness      Comparisons:  CT abdomen dated 2018 and chest x-ray      Impression: No acute pulmonary disease. Large hiatal hernia      KEN SPEARS M.D., ATTENDING RADIOLOGIST  This document has been electronically signed. Sep 10 2018  1:12PM          < from: CT Head No Cont (09.10.18 @ 10:39) >    EXAM:  CT BRAIN                            PROCEDURE DATE:  09/10/2018          INTERPRETATION:  EXAM: CT HEAD WITHOUT CONTRAST.     CLINICAL INDICATION: Slurred speech.     TECHNIQUE: Noncontrast imaging was performed. Axial, sagittal and   coronal scans are reviewed.     PRIOR EXAM: 2018.     FINDINGS:      Ventricles and cortical sulci are unremarkable for patient's age. Mild   periventricular height matter low density is suggestive of chronic small   vessel ischemic change. There is no evidence of any mass effect or   extra-axial collection. No abnormal intracranial calcifications are seen.    Bony calvarium, visualized orbits are unremarkable. There is soft tissue   density within the tip of right mastoid. Minimal ethmoid and maxillary   sinus disease is seen, chronic.      IMPRESSION:     No CT evidence of any acute intracranial abnormality.        < end of copied text >  < from: CT Head No Cont (09.10.18 @ 10:39) >    EXAM:  CT BRAIN                            PROCEDURE DATE:  09/10/2018          INTERPRETATION:  EXAM: CT HEAD WITHOUT CONTRAST.     CLINICAL INDICATION: Slurred speech.     TECHNIQUE: Noncontrast imaging was performed. Axial, sagittal and   coronal scans are reviewed.     PRIOR EXAM: 2018.     FINDINGS:      Ventricles and cortical sulci are unremarkable for patient's age. Mild   periventricular height matter low density is suggestive of chronic small   vessel ischemic change. There is no evidence of any mass effect or   extra-axial collection. No abnormal intracranial calcifications are seen.    Bony calvarium, visualized orbits are unremarkable. There is soft tissue   density within the tip of right mastoid. Minimal ethmoid and maxillary   sinus disease is seen, chronic.      IMPRESSION:     No CT evidence of any acute intracranial abnormality.        < end of copied text >  < from: CT Head No Cont (09.10.18 @ 10:39) >      EXAM:  CT BRAIN                          PROCEDURE DATE:  09/10/2018      INTERPRETATION:  EXAM: CT HEAD WITHOUT CONTRAST.     CLINICAL INDICATION: Slurred speech.     TECHNIQUE: Noncontrast imaging was performed. Axial, sagittal and   coronal scans are reviewed.     PRIOR EXAM: 2018.    IMPRESSION:     No CT evidence of any acute intracranial abnormality.        < end of copied text >        HEALTH ISSUES - PROBLEM Dx:  Need for prophylactic measure: Need for prophylactic measure  Duodenal ulcer: Duodenal ulcer  BPH (benign prostatic hyperplasia): BPH (benign prostatic hyperplasia)  Anxiety: Anxiety  TIA (transient ischemic attack): TIA (transient ischemic attack)  CKD (chronic kidney disease), stage III: CKD (chronic kidney disease), stage III  Pre-syncope: Pre-syncope  Unable to ambulate: Unable to ambulate  Weakness: Weakness          Consultant(s) Notes Reviewed:  [  ] YES     Care Discussed with [X] Consultants  [  ] Patient  [  ] Family  [  ]   [  ] Social Service  [  ] RN, [  ] Physical Therapy  DVT PPX: [  ] Lovenox, [  ] S C Heparin, [  ] Coumadin, [  ] Xarelto, [  ] Eliquis, [  ] SCD   Advanced directive: [  ] None, [  ] DNR/DNI Patient is a 88y old  Male who presents with a chief complaint of weakness (11 Sep 2018 07:19)    HPI:  89 y/o M with PMHx of BPH, TIA, anxiety, duodenal ulcer, multiple falls presents from home with weakness and pre-syncope. States that he has been dizzy for quite some time and was put on meclizine, which has not been giving him relief. Saw a female neurologist on Friday, as he "thought he had a brain tumor." Neurology told the patient that meclizine may have been worsening his symptoms. States that his dizziness is worse in the morning upon getting up from bed. Lives with his wife. States that he was ambulating with his walker and ended up seated on the floor. Was unable to pull himself up and his wife was unable to get him up so she called EMS. States that he was recently in rehab about 5 weeks ago. States that he had been walking 2-3 blocks as recently as last week but had since not done so the last few days. States that he still took his meclizine the last two days. Had some slurring of his speech, which has resolved. He has had similar presentations with falls over the past few weeks with weakness in his legs and slurring of speech. Currently still admitting to dizziness. Of note, has long standing anxiety for which he takes xanax 2-3 times a day, standing. Was taking it TID for a long time and recently tried dropping down to BID, with no withdrawal symptoms. States that he had withdrawal from stopping xanax for 5 days at one time when he ran out of his prescription, this event resolved after taking xanax. Denies CP, palpitations, dyspnea, FINE, PND, abdominal pain, n/v/d, dysuria or hematuria.    In the ED, VSS. Labs significant for H/H 12.2/36.2, BUN/Cr of 32/1.50. UA negative. CT head negative. CXR no acute pulmonary process, large hiatal hernia. XR Right Hip/Pelvis, Right Femur, Left Hand were negative for acute dislocation or fracture. EKG: NSR, normal axis, no ST abnormality. (10 Sep 2018 14:05)      INTERVAL HPI:   Patient seen and examined at bedside. Patient's wife was present at the time, and states that the patient is back to his baseline. Pt is complaining of constipation and increased frequency of urination, but is otherwise feeling well. Denies any dizziness or symptoms of vertigo, headaches, confusion, slurred speech, or RUE and RLE weakness. No CP, palpitations, SOB, or dyspnea. No focal deficits. Later at 11 am, the nurse called and says that he had a possible syncope while the patient was passing a BM. Pt states that was straining with constipation, nurse found him slumped in the bathroom and helped him get back into bed. Slight slurring of speech was noted as well as right sided extremity drift. MRI, ECHO, and carotid doppler was ordered. Orthostatics were checked at bedside, only 10 change in systolic.    OVERNIGHT EVENTS:   Code Stroke was called for slurred speech and right sided weakness last night, which resolved within minutes. No focal deficits. Stat head CT was negative. Prior to this, the patient was given 20 mg Hydralazine for elevated BP in the 180s.            MEDICATIONS  (STANDING):  amLODIPine   Tablet 5 milliGRAM(s) Oral daily  aspirin enteric coated 81 milliGRAM(s) Oral daily  atorvastatin 10 milliGRAM(s) Oral at bedtime  buPROPion XL . 150 milliGRAM(s) Oral daily  docusate sodium 100 milliGRAM(s) Oral three times a day  heparin  Injectable 5000 Unit(s) SubCutaneous every 8 hours  pantoprazole    Tablet 40 milliGRAM(s) Oral before breakfast  potassium chloride    Tablet ER 40 milliEquivalent(s) Oral once  sucralfate 1 Gram(s) Oral <User Schedule>  tamsulosin 0.4 milliGRAM(s) Oral at bedtime    MEDICATIONS  (PRN):  ALPRAZolam 1 milliGRAM(s) Oral three times a day PRN anxiety  hydrALAZINE 25 milliGRAM(s) Oral every 6 hours PRN Systolic blood pressure >180  senna 2 Tablet(s) Oral at bedtime PRN Constipation      Allergies    No Known Allergies    Intolerances        REVIEW OF SYSTEMS:  CONSTITUTIONAL: No fever, No chills, No fatigue  EYES: No eye pain, visual disturbances, or discharge  ENMT:  No ear pain, No nose bleed, No vertigo; No sinus or throat pain, No Congestion  NECK: No pain, No stiffness  RESPIRATORY: No cough, wheezing, No hemoptysis, No shortness of breath  CARDIOVASCULAR: No chest pain, palpitations  GASTROINTESTINAL: No abdominal or epigastric pain. No nausea, No vomiting; + Constipation  GENITOURINARY: No dysuria, No frequency, No urgency, No hematuria, or incontinence  NEUROLOGICAL: No headaches, No dizziness, No numbness, No tingling, No tremors, No weakness  EXT: No Swelling, No Pain, No Edema  SKIN:  No itching, burning, rashes, or lesions   MUSCULOSKELETAL: No joint pain or swelling; No muscle pain, No back pain, No extremity pain  PSYCHIATRIC: No depression, anxiety, mood swings or difficulty sleeping at night      Vital Signs Last 24 Hrs  T(C): 36.6 (11 Sep 2018 04:59), Max: 36.7 (10 Sep 2018 21:16)  T(F): 97.8 (11 Sep 2018 04:59), Max: 98 (10 Sep 2018 21:16)  HR: 92 (11 Sep 2018 04:59) (73 - 98)  BP: 99/62 (11 Sep 2018 04:59) (99/62 - 212/119)  BP(mean): --  RR: 16 (11 Sep 2018 04:59) (14 - 18)  SpO2: 96% (11 Sep 2018 04:59) (95% - 99%)  Finger Stick  112 (10 Sep 2018 23:17)        09-10 @ 07:01  -  -11 @ 07:00  --------------------------------------------------------  IN: 0 mL / OUT: 1000 mL / NET: -1000 mL        PHYSICAL EXAM:  GENERAL:  NAD,  well appearing  HEAD:  Normocephalic, atraumatic  EYES:  EOMI, PERRLA, conjunctiva and sclera clear normal  ENMT: Moist mucous membranes, good dentition  NECK: Supple; No JVD, Normal thyroid, no Lymphadenopathy  NERVOUS SYSTEM: Alert & Oriented; RUE or RLE weakness/ drift; slight slurring of speech   CHEST/LUNG: clear to auscultation bilaterally, No rales, No rhonchi, No wheezing  HEART:  Regular rate and rhythm, No murmurs, rubs, or gallops  ABDOMEN: Soft, Nontender, Nondistended, Bowel sounds present  EXTREMITIES: 2+ Peripheral Pulses, No clubbing, cyanosis, or edema  LYMPH: No lymphadenopathy noted  SKIN:  No rashes or lesions      LABS:                        12.3   5.01  )-----------( 167      ( 11 Sep 2018 08:49 )             36.7     11 Sep 2018 08:49    146    |  112    |  24     ----------------------------<  94     3.5     |  25     |  1.20     Ca    8.7        11 Sep 2018 08:49    TPro  6.1    /  Alb  3.2    /  TBili  0.5    /  DBili  x      /  AST  19     /  ALT  25     /  AlkPhos  71     10 Sep 2018 10:12    PT/INR - ( 10 Sep 2018 10:12 )   PT: 11.9 sec;   INR: 1.09 ratio         PTT - ( 10 Sep 2018 10:12 )  PTT:30.7 sec  Urinalysis Basic - ( 10 Sep 2018 11:25 )    Color: Yellow / Appearance: Clear / S.015 / pH: x  Gluc: x / Ketone: Negative  / Bili: Negative / Urobili: Negative   Blood: x / Protein: Negative / Nitrite: Negative   Leuk Esterase: Negative / RBC: x / WBC x   Sq Epi: x / Non Sq Epi: x / Bacteria: x          RADIOLOGY & ADDITIONAL TESTS:    < from: CT Brain Stroke Protocol (09.10.18 @ 23:45) >    EXAM:  CT BRAIN STROKE PROTOCOL                            PROCEDURE DATE:  09/10/2018        INTERPRETATION:  CLINICAL INFORMATION: acute episode of R sided hand   weakness and slurred speech    TECHNIQUE: Noncontrast CT examination of the head was performed using   contiguous 5 mm CT images.    COMPARISON: September 10, 2018      IMPRESSION:     No evidence of acute intracranial hemorrhage, midline shift or CT   evidence of acute territorial infarct.    If the patient's symptoms persist, consider short interval follow-up head   CT or brain MRI if there are no MRI contraindications.      RAVI FOSTER M.D., ATTENDING RADIOLOGIST  This document has been electronically signed. Sep 10 2018 11:58PM        < from: Xray Chest 1 View AP/PA (09.10.18 @ 13:01) >    EXAM:  XR CHEST AP OR PA 1V                            PROCEDURE DATE:  09/10/2018      INTERPRETATION:  History: Weakness      Comparisons:  CT abdomen dated 2018 and chest x-ray      Impression: No acute pulmonary disease. Large hiatal hernia      KEN SPEARS M.D., ATTENDING RADIOLOGIST  This document has been electronically signed. Sep 10 2018  1:12PM          < from: CT Head No Cont (09.10.18 @ 10:39) >    EXAM:  CT BRAIN                            PROCEDURE DATE:  09/10/2018          INTERPRETATION:  EXAM: CT HEAD WITHOUT CONTRAST.     CLINICAL INDICATION: Slurred speech.     TECHNIQUE: Noncontrast imaging was performed. Axial, sagittal and   coronal scans are reviewed.     PRIOR EXAM: 2018.     FINDINGS:      Ventricles and cortical sulci are unremarkable for patient's age. Mild   periventricular height matter low density is suggestive of chronic small   vessel ischemic change. There is no evidence of any mass effect or   extra-axial collection. No abnormal intracranial calcifications are seen.    Bony calvarium, visualized orbits are unremarkable. There is soft tissue   density within the tip of right mastoid. Minimal ethmoid and maxillary   sinus disease is seen, chronic.      IMPRESSION:     No CT evidence of any acute intracranial abnormality.        < end of copied text >  < from: CT Head No Cont (09.10.18 @ 10:39) >    EXAM:  CT BRAIN                            PROCEDURE DATE:  09/10/2018          INTERPRETATION:  EXAM: CT HEAD WITHOUT CONTRAST.     CLINICAL INDICATION: Slurred speech.     TECHNIQUE: Noncontrast imaging was performed. Axial, sagittal and   coronal scans are reviewed.     PRIOR EXAM: 2018.     FINDINGS:      Ventricles and cortical sulci are unremarkable for patient's age. Mild   periventricular height matter low density is suggestive of chronic small   vessel ischemic change. There is no evidence of any mass effect or   extra-axial collection. No abnormal intracranial calcifications are seen.    Bony calvarium, visualized orbits are unremarkable. There is soft tissue   density within the tip of right mastoid. Minimal ethmoid and maxillary   sinus disease is seen, chronic.      IMPRESSION:     No CT evidence of any acute intracranial abnormality.        < end of copied text >  < from: CT Head No Cont (09.10.18 @ 10:39) >      EXAM:  CT BRAIN                          PROCEDURE DATE:  09/10/2018      INTERPRETATION:  EXAM: CT HEAD WITHOUT CONTRAST.     CLINICAL INDICATION: Slurred speech.     TECHNIQUE: Noncontrast imaging was performed. Axial, sagittal and   coronal scans are reviewed.     PRIOR EXAM: 2018.    IMPRESSION:     No CT evidence of any acute intracranial abnormality.        < end of copied text >        HEALTH ISSUES - PROBLEM Dx:  Need for prophylactic measure: Need for prophylactic measure  Duodenal ulcer: Duodenal ulcer  BPH (benign prostatic hyperplasia): BPH (benign prostatic hyperplasia)  Anxiety: Anxiety  TIA (transient ischemic attack): TIA (transient ischemic attack)  CKD (chronic kidney disease), stage III: CKD (chronic kidney disease), stage III  Pre-syncope: Pre-syncope  Unable to ambulate: Unable to ambulate  Weakness: Weakness          Consultant(s) Notes Reviewed:  [  ] YES     Care Discussed with [X] Consultants  [  ] Patient  [  ] Family  [  ]   [  ] Social Service  [  ] RN, [  ] Physical Therapy  DVT PPX: [  ] Lovenox, [  ] S C Heparin, [  ] Coumadin, [  ] Xarelto, [  ] Eliquis, [  ] SCD   Advanced directive: [  ] None, [  ] DNR/DNI Patient is a 88y old  Male who presents with a chief complaint of weakness (11 Sep 2018 07:19)    HPI:  87 y/o M with PMHx of BPH, TIA, anxiety, duodenal ulcer, multiple falls presents from home with weakness and pre-syncope. States that he has been dizzy for quite some time and was put on meclizine, which has not been giving him relief. Saw a female neurologist on Friday, as he "thought he had a brain tumor." Neurology told the patient that meclizine may have been worsening his symptoms. States that his dizziness is worse in the morning upon getting up from bed. Lives with his wife. States that he was ambulating with his walker and ended up seated on the floor. Was unable to pull himself up and his wife was unable to get him up so she called EMS. States that he was recently in rehab about 5 weeks ago. States that he had been walking 2-3 blocks as recently as last week but had since not done so the last few days. States that he still took his meclizine the last two days. Had some slurring of his speech, which has resolved. He has had similar presentations with falls over the past few weeks with weakness in his legs and slurring of speech. Currently still admitting to dizziness. Of note, has long standing anxiety for which he takes xanax 2-3 times a day, standing. Was taking it TID for a long time and recently tried dropping down to BID, with no withdrawal symptoms. States that he had withdrawal from stopping xanax for 5 days at one time when he ran out of his prescription, this event resolved after taking xanax. Denies CP, palpitations, dyspnea, FINE, PND, abdominal pain, n/v/d, dysuria or hematuria.    In the ED, VSS. Labs significant for H/H 12.2/36.2, BUN/Cr of 32/1.50. UA negative. CT head negative. CXR no acute pulmonary process, large hiatal hernia. XR Right Hip/Pelvis, Right Femur, Left Hand were negative for acute dislocation or fracture. EKG: NSR, normal axis, no ST abnormality. (10 Sep 2018 14:05)      OVERNIGHT EVENTS:   Code Stroke was called for slurred speech and right sided weakness last night, which resolved within minutes. No focal deficits. Stat head CT was negative. Prior to this, the patient was given 20 mg Hydralazine for elevated BP in the 180s. Sx likely due to transient hypoperfusion.    INTERVAL HPI:   Patient seen and examined at bedside. Patient's wife was present at the time, and states that the patient is back to his baseline. Pt is complaining of constipation and increased frequency of urination, but is otherwise feeling well. Denies any dizziness or symptoms of vertigo, headaches, confusion, slurred speech, or RUE and RLE weakness. No CP, palpitations, SOB, or dyspnea. No focal deficits. Later at 11 am, the nurse called and says that he had a possible syncope while the patient was passing a BM. Pt states that was straining with constipation, nurse found him slumped in the bathroom and helped him get back into bed. Slight slurring of speech was noted as well as right sided extremity drift. MRI, ECHO, and carotid doppler was ordered. Orthostatics were checked at bedside, only 10 change in systolic.      MEDICATIONS  (STANDING):  amLODIPine   Tablet 5 milliGRAM(s) Oral daily  aspirin enteric coated 81 milliGRAM(s) Oral daily  atorvastatin 10 milliGRAM(s) Oral at bedtime  buPROPion XL . 150 milliGRAM(s) Oral daily  docusate sodium 100 milliGRAM(s) Oral three times a day  heparin  Injectable 5000 Unit(s) SubCutaneous every 8 hours  pantoprazole    Tablet 40 milliGRAM(s) Oral before breakfast  potassium chloride    Tablet ER 40 milliEquivalent(s) Oral once  sucralfate 1 Gram(s) Oral <User Schedule>  tamsulosin 0.4 milliGRAM(s) Oral at bedtime    MEDICATIONS  (PRN):  ALPRAZolam 1 milliGRAM(s) Oral three times a day PRN anxiety  hydrALAZINE 25 milliGRAM(s) Oral every 6 hours PRN Systolic blood pressure >180  senna 2 Tablet(s) Oral at bedtime PRN Constipation      Allergies    No Known Allergies    Intolerances        REVIEW OF SYSTEMS:  CONSTITUTIONAL: No fever, No chills, No fatigue  EYES: No eye pain, visual disturbances, or discharge  ENMT:  No ear pain, No nose bleed, No vertigo; No sinus or throat pain, No Congestion  NECK: No pain, No stiffness  RESPIRATORY: No cough, wheezing, No hemoptysis, No shortness of breath  CARDIOVASCULAR: No chest pain, palpitations  GASTROINTESTINAL: No abdominal or epigastric pain. No nausea, No vomiting; + Constipation  GENITOURINARY: No dysuria, No frequency, No urgency, No hematuria, or incontinence  NEUROLOGICAL: No headaches, No dizziness, No numbness, No tingling, No tremors, No weakness  EXT: No Swelling, No Pain, No Edema  SKIN:  No itching, burning, rashes, or lesions   MUSCULOSKELETAL: No joint pain or swelling; No muscle pain, No back pain, No extremity pain  PSYCHIATRIC: No depression, anxiety, mood swings or difficulty sleeping at night      Vital Signs Last 24 Hrs  T(C): 36.6 (11 Sep 2018 04:59), Max: 36.7 (10 Sep 2018 21:16)  T(F): 97.8 (11 Sep 2018 04:59), Max: 98 (10 Sep 2018 21:16)  HR: 92 (11 Sep 2018 04:59) (73 - 98)  BP: 99/62 (11 Sep 2018 04:59) (99/62 - 212/119)  BP(mean): --  RR: 16 (11 Sep 2018 04:59) (14 - 18)  SpO2: 96% (11 Sep 2018 04:59) (95% - 99%)  Finger Stick  112 (10 Sep 2018 23:17)        -10 @ 07:01  -  -11 @ 07:00  --------------------------------------------------------  IN: 0 mL / OUT: 1000 mL / NET: -1000 mL        PHYSICAL EXAM:  GENERAL:  NAD,  well appearing  HEAD:  Normocephalic, atraumatic  EYES:  EOMI, PERRLA, conjunctiva and sclera clear normal  ENMT: Moist mucous membranes, good dentition  NECK: Supple; No JVD, Normal thyroid, no Lymphadenopathy  NERVOUS SYSTEM: Alert & Oriented; RUE or RLE weakness/ drift; slight slurring of speech   CHEST/LUNG: clear to auscultation bilaterally, No rales, No rhonchi, No wheezing  HEART:  Regular rate and rhythm, No murmurs, rubs, or gallops  ABDOMEN: Soft, Nontender, Nondistended, Bowel sounds present  EXTREMITIES: 2+ Peripheral Pulses, No clubbing, cyanosis, or edema  LYMPH: No lymphadenopathy noted  SKIN:  No rashes or lesions      LABS:                        12.3   5.01  )-----------( 167      ( 11 Sep 2018 08:49 )             36.7     11 Sep 2018 08:49    146    |  112    |  24     ----------------------------<  94     3.5     |  25     |  1.20     Ca    8.7        11 Sep 2018 08:49    TPro  6.1    /  Alb  3.2    /  TBili  0.5    /  DBili  x      /  AST  19     /  ALT  25     /  AlkPhos  71     10 Sep 2018 10:12    PT/INR - ( 10 Sep 2018 10:12 )   PT: 11.9 sec;   INR: 1.09 ratio         PTT - ( 10 Sep 2018 10:12 )  PTT:30.7 sec  Urinalysis Basic - ( 10 Sep 2018 11:25 )    Color: Yellow / Appearance: Clear / S.015 / pH: x  Gluc: x / Ketone: Negative  / Bili: Negative / Urobili: Negative   Blood: x / Protein: Negative / Nitrite: Negative   Leuk Esterase: Negative / RBC: x / WBC x   Sq Epi: x / Non Sq Epi: x / Bacteria: x          RADIOLOGY & ADDITIONAL TESTS:    < from: CT Brain Stroke Protocol (09.10.18 @ 23:45) >    EXAM:  CT BRAIN STROKE PROTOCOL                            PROCEDURE DATE:  09/10/2018        INTERPRETATION:  CLINICAL INFORMATION: acute episode of R sided hand   weakness and slurred speech    TECHNIQUE: Noncontrast CT examination of the head was performed using   contiguous 5 mm CT images.    COMPARISON: September 10, 2018      IMPRESSION:     No evidence of acute intracranial hemorrhage, midline shift or CT   evidence of acute territorial infarct.    If the patient's symptoms persist, consider short interval follow-up head   CT or brain MRI if there are no MRI contraindications.      RAVI FOSTER M.D., ATTENDING RADIOLOGIST  This document has been electronically signed. Sep 10 2018 11:58PM        < from: Xray Chest 1 View AP/PA (09.10.18 @ 13:01) >    EXAM:  XR CHEST AP OR PA 1V                            PROCEDURE DATE:  09/10/2018      INTERPRETATION:  History: Weakness      Comparisons:  CT abdomen dated 2018 and chest x-ray      Impression: No acute pulmonary disease. Large hiatal hernia      KEN TORY M.D., ATTENDING RADIOLOGIST  This document has been electronically signed. Sep 10 2018  1:12PM          < from: CT Head No Cont (09.10.18 @ 10:39) >    EXAM:  CT BRAIN                            PROCEDURE DATE:  09/10/2018          INTERPRETATION:  EXAM: CT HEAD WITHOUT CONTRAST.     CLINICAL INDICATION: Slurred speech.     TECHNIQUE: Noncontrast imaging was performed. Axial, sagittal and   coronal scans are reviewed.     PRIOR EXAM: 2018.     FINDINGS:      Ventricles and cortical sulci are unremarkable for patient's age. Mild   periventricular height matter low density is suggestive of chronic small   vessel ischemic change. There is no evidence of any mass effect or   extra-axial collection. No abnormal intracranial calcifications are seen.    Bony calvarium, visualized orbits are unremarkable. There is soft tissue   density within the tip of right mastoid. Minimal ethmoid and maxillary   sinus disease is seen, chronic.      IMPRESSION:     No CT evidence of any acute intracranial abnormality.        < end of copied text >  < from: CT Head No Cont (09.10.18 @ 10:39) >    EXAM:  CT BRAIN                            PROCEDURE DATE:  09/10/2018          INTERPRETATION:  EXAM: CT HEAD WITHOUT CONTRAST.     CLINICAL INDICATION: Slurred speech.     TECHNIQUE: Noncontrast imaging was performed. Axial, sagittal and   coronal scans are reviewed.     PRIOR EXAM: 2018.     FINDINGS:      Ventricles and cortical sulci are unremarkable for patient's age. Mild   periventricular height matter low density is suggestive of chronic small   vessel ischemic change. There is no evidence of any mass effect or   extra-axial collection. No abnormal intracranial calcifications are seen.    Bony calvarium, visualized orbits are unremarkable. There is soft tissue   density within the tip of right mastoid. Minimal ethmoid and maxillary   sinus disease is seen, chronic.      IMPRESSION:     No CT evidence of any acute intracranial abnormality.        < end of copied text >  < from: CT Head No Cont (09.10.18 @ 10:39) >      EXAM:  CT BRAIN                          PROCEDURE DATE:  09/10/2018      INTERPRETATION:  EXAM: CT HEAD WITHOUT CONTRAST.     CLINICAL INDICATION: Slurred speech.     TECHNIQUE: Noncontrast imaging was performed. Axial, sagittal and   coronal scans are reviewed.     PRIOR EXAM: 2018.    IMPRESSION:     No CT evidence of any acute intracranial abnormality.    < end of copied text >        HEALTH ISSUES - PROBLEM Dx:  Need for prophylactic measure: Need for prophylactic measure  Duodenal ulcer: Duodenal ulcer  BPH (benign prostatic hyperplasia): BPH (benign prostatic hyperplasia)  Anxiety: Anxiety  TIA (transient ischemic attack): TIA (transient ischemic attack)  CKD (chronic kidney disease), stage III: CKD (chronic kidney disease), stage III  Pre-syncope: Pre-syncope  Unable to ambulate: Unable to ambulate  Weakness: Weakness      Consultant(s) Notes Reviewed:  [ x ] YES     Care Discussed with [X] Consultants  [  ] Patient  [  ] Family  [  ]   [  ] Social Service  [  ] RN, [  ] Physical Therapy  DVT PPX: [  ] Lovenox, [  ] S C Heparin, [x  ] Coumadin, [  ] Xarelto, [  ] Eliquis, [  ] SCD   Advanced directive: [  ] None, [  ] DNR/DNI

## 2018-09-11 NOTE — PROGRESS NOTE ADULT - ASSESSMENT
The patient is an 88 year old male with a history of BPH, TIA, anxiety, duodenal ulcer, multiple falls who presents with pre-syncope.    Plan:  - ECG with no predisposing features for syncope  - Echocardiogram 5/24/18 with normal LV systolic function, no significant valve issues  - Orthostatics negative  - Neurology follow-up regarding need for MRI  - Labile BP; significantly hypertensive overnight and now BP on lower side this morning  - Continue amlodipine 5 mg daily with holding parameters  - Continue hydralazine 25 mg PO q6h prn SBP > 180  - Continue aspirin 81 mg daily and atorvastatin 10 mg daily

## 2018-09-11 NOTE — PROGRESS NOTE ADULT - ATTENDING COMMENTS
pt seen w housestaff and agree w above  suspect underlying etiology of above events is cerebral hypoperfusion- would use more restrictive hold parameters for antihypertensives in setting of possible TIA/CVA.    off anticholinergic and systemic alpha blocker for BPH in setting of orthostasis.  will need rehab

## 2018-09-11 NOTE — PROGRESS NOTE ADULT - ASSESSMENT
87 y/o M with PMHx of BPH, TIA, anxiety, duodenal ulcer, multiple falls presents from home with weakness and pre-syncope.

## 2018-09-11 NOTE — CHART NOTE - NSCHARTNOTEFT_GEN_A_CORE
Rapid Response Follow Up     Pt seen and examined at bedside. Patient was asleep. On awakening, felt fine. Denies difficulty speaking, loss of sensation on right side, or weakness in right arm, CP SOB< palpitations.    PHYSICAL EXAM:  General: Well developed, NAD, resting comfortably in bed  HEENT: NCAT, moist mucous membranes   Neck: Supple, nontender,  Neurology: A&Ox4, nonfocal  Respiratory: CTA B/L, No wheezes, rales, rhonchi  CV: RRR, +S1/S2, no murmurs, rubs or gallops  Abdominal: Soft, NT, ND + BS  Extremities: No clubbing, cyanosis, edema; + peripheral pulses  MSK: Normal ROM, no joint erythema or warmth, no joint swelling   Skin: warm, dry, normal color, no rash or abnormal lesions      Vital Signs Last 24 Hrs  T(C): 36.6 (10 Sep 2018 23:09), Max: 36.8 (10 Sep 2018 09:50)  T(F): 97.8 (10 Sep 2018 23:09), Max: 98.2 (10 Sep 2018 09:50)  HR: 93 (10 Sep 2018 23:09) (73 - 98)  BP: 115/70 (10 Sep 2018 23:09) (115/70 - 212/119)  BP(mean): --  RR: 18 (10 Sep 2018 23:09) (14 - 18)  SpO2: 96% (10 Sep 2018 23:09) (95% - 99%)                          12.2   4.98  )-----------( 151      ( 10 Sep 2018 10:12 )             36.2     09-10    145  |  112<H>  |  32<H>  ----------------------------<  104<H>  4.1   |  26  |  1.50<H>    Ca    8.5      10 Sep 2018 10:12    TPro  6.1  /  Alb  3.2<L>  /  TBili  0.5  /  DBili  x   /  AST  19  /  ALT  25  /  AlkPhos  71  09-10    PT/INR - ( 10 Sep 2018 10:12 )   PT: 11.9 sec;   INR: 1.09 ratio    PTT - ( 10 Sep 2018 10:12 )  PTT:30.7 sec  Urinalysis Basic - ( 10 Sep 2018 11:25 )    Color: Yellow / Appearance: Clear / S.015 / pH: x  Gluc: x / Ketone: Negative  / Bili: Negative / Urobili: Negative   Blood: x / Protein: Negative / Nitrite: Negative   Leuk Esterase: Negative / RBC: x / WBC x   Sq Epi: x / Non Sq Epi: x / Bacteria: x        A/P 87 y/o M with PMHx of BPH, TIA, anxiety, duodenal ulcer, multiple falls admitted with presyncope and weakness.    Plan: Stable. Continue to monitor with current management.

## 2018-09-11 NOTE — PROGRESS NOTE ADULT - PROBLEM SELECTOR PLAN 3
likely from cerebral hypoperfusion; CT negative for acute pathology  mild QTc prolongation on EKG, without other predisposing features for syncope  will hold meclizine per neurology recommendations  monitor for arrhythmia on telemetry  -Orthostatic vitals were negative  -neurology follow up regarding need for MRI- Dr. Yu  -cardiology- Dr. Christ Carter  -Labile BP; patient was significantly hypertensive overnight and now BP on lower side this morning. Per cardio, continue amlodipine 5 mg daily with holding parameters and hydralazine 25 mg PO q6h prn SBP >180 -from cerebral hypoperfusion; CT negative for acute pathology  -mild QTc prolongation on EKG, without other predisposing features for syncope  -will hold meclizine per neurology recommendations  -monitor for arrhythmia on telemetry  -Orthostatic vitals were negative  -neurology follow up regarding need for MRI- Dr. Yu  -cardiology- Dr. Christ Carter  -Labile BP; patient was significantly hypertensive overnight and now BP on lower side this morning. Per cardio, continue amlodipine 5 mg daily with holding parameters and hydralazine 25 mg PO q6h prn SBP >180 -from cerebral hypoperfusion; CT negative for acute pathology  -mild QTc prolongation on EKG, without other predisposing features for syncope  -will hold meclizine per neurology recommendations  -monitor for arrhythmia on telemetry  -Orthostatic vitals were negative, repeated again on 9/11/18, again negative  -Cardiology consulted - Dr. Christ Carter  -Labile BP; patient was significantly hypertensive overnight (9/11/18) and now BP on lower side this morning. Per cardio, continue amlodipine 5 mg daily with holding parameters and hydralazine 25 mg PO q6h prn SBP >180

## 2018-09-11 NOTE — PROGRESS NOTE ADULT - SUBJECTIVE AND OBJECTIVE BOX
Patient is a 88y old  Male who presents with a chief complaint of weakness (11 Sep 2018 07:15)    HPI:  87 y/o M with PMHx of BPH, TIA, anxiety, duodenal ulcer, multiple falls presents from home with weakness and pre-syncope. States that he has been dizzy for quite some time and was put on meclizine, which has not been giving him relief. Saw a female neurologist on Friday, as he "thought he had a brain tumor." Neurology told the patient that meclizine may have been worsening his symptoms. States that his dizziness is worse in the morning upon getting up from bed. Lives with his wife. States that he was ambulating with his walker and ended up seated on the floor. Was unable to pull himself up and his wife was unable to get him up so she called EMS. States that he was recently in rehab about 5 weeks ago. States that he had been walking 2-3 blocks as recently as last week but had since not done so the last few days. States that he still took his meclizine the last two days. Had some slurring of his speech, which has resolved. He has had similar presentations with falls over the past few weeks with weakness in his legs and slurring of speech. Currently still admitting to dizziness. Of note, has long standing anxiety for which he takes xanax 2-3 times a day, standing. Was taking it TID for a long time and recently tried dropping down to BID, with no withdrawal symptoms. States that he had withdrawal from stopping xanax for 5 days at one time when he ran out of his prescription, this event resolved after taking xanax. Denies CP, palpitations, dyspnea, FINE, PND, abdominal pain, n/v/d, dysuria or hematuria.    In the ED, VSS. Labs significant for H/H 12.2/36.2, BUN/Cr of 32/1.50. UA negative. CT head negative. CXR no acute pulmonary process, large hiatal hernia. XR Right Hip/Pelvis, Right Femur, Left Hand were negative for acute dislocation or fracture. EKG: NSR, normal axis, no ST abnormality. (10 Sep 2018 14:05)      INTERVAL HPI/OVERNIGHT EVENTS:      T(C): 36.6 (18 @ 04:59), Max: 36.8 (09-10-18 @ 09:50)  HR: 92 (18 @ 04:59) (73 - 98)  BP: 99/62 (18 @ 04:59) (99/62 - 212/119)  RR: 16 (18 @ 04:59) (14 - 18)  SpO2: 96% (18 @ 04:59) (95% - 99%)  Wt(kg): --  I&O's Summary    10 Sep 2018 07:01  -  11 Sep 2018 07:00  --------------------------------------------------------  IN: 0 mL / OUT: 1000 mL / NET: -1000 mL        LABS:                        12.2   4.98  )-----------( 151      ( 10 Sep 2018 10:12 )             36.2     0910    145  |  112<H>  |  32<H>  ----------------------------<  104<H>  4.1   |  26  |  1.50<H>    Ca    8.5      10 Sep 2018 10:12    TPro  6.1  /  Alb  3.2<L>  /  TBili  0.5  /  DBili  x   /  AST  19  /  ALT  25  /  AlkPhos  71  09-10    PT/INR - ( 10 Sep 2018 10:12 )   PT: 11.9 sec;   INR: 1.09 ratio         PTT - ( 10 Sep 2018 10:12 )  PTT:30.7 sec  Urinalysis Basic - ( 10 Sep 2018 11:25 )    Color: Yellow / Appearance: Clear / S.015 / pH: x  Gluc: x / Ketone: Negative  / Bili: Negative / Urobili: Negative   Blood: x / Protein: Negative / Nitrite: Negative   Leuk Esterase: Negative / RBC: x / WBC x   Sq Epi: x / Non Sq Epi: x / Bacteria: x      CAPILLARY BLOOD GLUCOSE  112 (10 Sep 2018 23:17)      POCT Blood Glucose.: 112 mg/dL (10 Sep 2018 23:26)        Urinalysis Basic - ( 10 Sep 2018 11:25 )    Color: Yellow / Appearance: Clear / S.015 / pH: x  Gluc: x / Ketone: Negative  / Bili: Negative / Urobili: Negative   Blood: x / Protein: Negative / Nitrite: Negative   Leuk Esterase: Negative / RBC: x / WBC x   Sq Epi: x / Non Sq Epi: x / Bacteria: x        MEDICATIONS  (STANDING):  amLODIPine   Tablet 5 milliGRAM(s) Oral daily  aspirin enteric coated 81 milliGRAM(s) Oral daily  atorvastatin 10 milliGRAM(s) Oral at bedtime  buPROPion XL . 150 milliGRAM(s) Oral daily  docusate sodium 100 milliGRAM(s) Oral three times a day  heparin  Injectable 5000 Unit(s) SubCutaneous every 8 hours  pantoprazole    Tablet 40 milliGRAM(s) Oral before breakfast  sucralfate 1 Gram(s) Oral <User Schedule>  tamsulosin 0.4 milliGRAM(s) Oral at bedtime    MEDICATIONS  (PRN):  ALPRAZolam 1 milliGRAM(s) Oral three times a day PRN anxiety  hydrALAZINE 25 milliGRAM(s) Oral every 6 hours PRN Systolic blood pressure >180  senna 2 Tablet(s) Oral at bedtime PRN Constipation      REVIEW OF SYSTEMS:  CONSTITUTIONAL: No fever, weight loss, or fatigue  EYES: No eye pain, visual disturbances, or discharge  ENMT:  No difficulty hearing, tinnitus, vertigo; No sinus or throat pain  NECK: No pain or stiffness  RESPIRATORY: No cough, wheezing, chills or hemoptysis; No shortness of breath  CARDIOVASCULAR: No chest pain, palpitations, dizziness, or leg swelling  GASTROINTESTINAL: No abdominal or epigastric pain. No nausea, vomiting, or hematemesis; No diarrhea or constipation. No melena or hematochezia.  GENITOURINARY: No dysuria, frequency, hematuria, or incontinence  NEUROLOGICAL: No headaches, memory loss, loss of strength, numbness, or tremors  SKIN: No itching, burning, rashes, or lesions   LYMPH NODES: No enlarged glands  ENDOCRINE: No heat or cold intolerance; No hair loss  MUSCULOSKELETAL: No joint pain or swelling; No muscle, back, or extremity pain  PSYCHIATRIC: No depression, anxiety, mood swings, or difficulty sleeping  HEME/LYMPH: No easy bruising, or bleeding gums  ALLERY AND IMMUNOLOGIC: No hives or eczema    RADIOLOGY & ADDITIONAL TESTS:    Imaging Personally Reviewed:  [ ] YES  [ ] NO    Consultant(s) Notes Reviewed:  [ ] YES  [ ] NO    PHYSICAL EXAM:  GENERAL: NAD, well-groomed, well-developed  HEAD:  Atraumatic, Normocephalic  EYES: EOMI, PERRLA, conjunctiva and sclera clear  ENMT: No tonsillar erythema, exudates, or enlargement; Moist mucous membranes, Good dentition, No lesions  NECK: Supple, No JVD, Normal thyroid  NERVOUS SYSTEM:  Alert & Oriented X3, Good concentration; Motor Strength 5/5 B/L upper and lower extremities; DTRs 2+ intact and symmetric  CHEST/LUNG: Clear to percussion bilaterally; No rales, rhonchi, wheezing, or rubs  HEART: Regular rate and rhythm; No murmurs, rubs, or gallops  ABDOMEN: Soft, Nontender, Nondistended; Bowel sounds present  EXTREMITIES:  2+ Peripheral Pulses, No clubbing, cyanosis, or edema  LYMPH: No lymphadenopathy noted  SKIN: No rashes or lesions    Care Discussed with Consultants/Other Providers [ ] YES  [ ] NO

## 2018-09-12 ENCOUNTER — TRANSCRIPTION ENCOUNTER (OUTPATIENT)
Age: 83
End: 2018-09-12

## 2018-09-12 LAB
ANION GAP SERPL CALC-SCNC: 6 MMOL/L — SIGNIFICANT CHANGE UP (ref 5–17)
BUN SERPL-MCNC: 26 MG/DL — HIGH (ref 7–23)
CALCIUM SERPL-MCNC: 8.8 MG/DL — SIGNIFICANT CHANGE UP (ref 8.5–10.1)
CHLORIDE SERPL-SCNC: 113 MMOL/L — HIGH (ref 96–108)
CO2 SERPL-SCNC: 26 MMOL/L — SIGNIFICANT CHANGE UP (ref 22–31)
CREAT SERPL-MCNC: 1.4 MG/DL — HIGH (ref 0.5–1.3)
GLUCOSE SERPL-MCNC: 95 MG/DL — SIGNIFICANT CHANGE UP (ref 70–99)
HCT VFR BLD CALC: 36.6 % — LOW (ref 39–50)
HGB BLD-MCNC: 12.2 G/DL — LOW (ref 13–17)
MCHC RBC-ENTMCNC: 30.5 PG — SIGNIFICANT CHANGE UP (ref 27–34)
MCHC RBC-ENTMCNC: 33.3 GM/DL — SIGNIFICANT CHANGE UP (ref 32–36)
MCV RBC AUTO: 91.5 FL — SIGNIFICANT CHANGE UP (ref 80–100)
NRBC # BLD: 0 /100 WBCS — SIGNIFICANT CHANGE UP (ref 0–0)
PLATELET # BLD AUTO: 168 K/UL — SIGNIFICANT CHANGE UP (ref 150–400)
POTASSIUM SERPL-MCNC: 4 MMOL/L — SIGNIFICANT CHANGE UP (ref 3.5–5.3)
POTASSIUM SERPL-SCNC: 4 MMOL/L — SIGNIFICANT CHANGE UP (ref 3.5–5.3)
RBC # BLD: 4 M/UL — LOW (ref 4.2–5.8)
RBC # FLD: 14.4 % — SIGNIFICANT CHANGE UP (ref 10.3–14.5)
SODIUM SERPL-SCNC: 145 MMOL/L — SIGNIFICANT CHANGE UP (ref 135–145)
WBC # BLD: 5.48 K/UL — SIGNIFICANT CHANGE UP (ref 3.8–10.5)
WBC # FLD AUTO: 5.48 K/UL — SIGNIFICANT CHANGE UP (ref 3.8–10.5)

## 2018-09-12 RX ORDER — SODIUM CHLORIDE 9 MG/ML
250 INJECTION INTRAMUSCULAR; INTRAVENOUS; SUBCUTANEOUS ONCE
Qty: 0 | Refills: 0 | Status: COMPLETED | OUTPATIENT
Start: 2018-09-12 | End: 2018-09-12

## 2018-09-12 RX ORDER — CLOPIDOGREL BISULFATE 75 MG/1
75 TABLET, FILM COATED ORAL DAILY
Qty: 0 | Refills: 0 | Status: DISCONTINUED | OUTPATIENT
Start: 2018-09-12 | End: 2018-09-13

## 2018-09-12 RX ORDER — AMLODIPINE BESYLATE 2.5 MG/1
10 TABLET ORAL DAILY
Qty: 0 | Refills: 0 | Status: DISCONTINUED | OUTPATIENT
Start: 2018-09-13 | End: 2018-09-13

## 2018-09-12 RX ADMIN — Medication 1 GRAM(S): at 05:11

## 2018-09-12 RX ADMIN — Medication 100 MILLIGRAM(S): at 05:11

## 2018-09-12 RX ADMIN — Medication 100 MILLIGRAM(S): at 21:41

## 2018-09-12 RX ADMIN — ATORVASTATIN CALCIUM 10 MILLIGRAM(S): 80 TABLET, FILM COATED ORAL at 21:41

## 2018-09-12 RX ADMIN — Medication 1 GRAM(S): at 21:41

## 2018-09-12 RX ADMIN — BUPROPION HYDROCHLORIDE 150 MILLIGRAM(S): 150 TABLET, EXTENDED RELEASE ORAL at 11:35

## 2018-09-12 RX ADMIN — Medication 100 MILLIGRAM(S): at 13:25

## 2018-09-12 RX ADMIN — AMLODIPINE BESYLATE 5 MILLIGRAM(S): 2.5 TABLET ORAL at 05:11

## 2018-09-12 RX ADMIN — PANTOPRAZOLE SODIUM 40 MILLIGRAM(S): 20 TABLET, DELAYED RELEASE ORAL at 05:11

## 2018-09-12 RX ADMIN — CLOPIDOGREL BISULFATE 75 MILLIGRAM(S): 75 TABLET, FILM COATED ORAL at 11:31

## 2018-09-12 RX ADMIN — SODIUM CHLORIDE 250 MILLILITER(S): 9 INJECTION INTRAMUSCULAR; INTRAVENOUS; SUBCUTANEOUS at 13:25

## 2018-09-12 RX ADMIN — HEPARIN SODIUM 5000 UNIT(S): 5000 INJECTION INTRAVENOUS; SUBCUTANEOUS at 13:24

## 2018-09-12 RX ADMIN — TAMSULOSIN HYDROCHLORIDE 0.4 MILLIGRAM(S): 0.4 CAPSULE ORAL at 21:41

## 2018-09-12 RX ADMIN — HEPARIN SODIUM 5000 UNIT(S): 5000 INJECTION INTRAVENOUS; SUBCUTANEOUS at 21:41

## 2018-09-12 RX ADMIN — Medication 1 MILLIGRAM(S): at 17:23

## 2018-09-12 RX ADMIN — HEPARIN SODIUM 5000 UNIT(S): 5000 INJECTION INTRAVENOUS; SUBCUTANEOUS at 05:11

## 2018-09-12 RX ADMIN — Medication 1 GRAM(S): at 13:25

## 2018-09-12 NOTE — PROGRESS NOTE ADULT - PROBLEM SELECTOR PLAN 8
-was found to have an ulcer 6 months ago with GI  -continue on sucralfate 1g tid and protonix 40mg daily
was found to have an ulcer 6 months ago with GI  continue on sucralfate 1g tid and protonix 40mg daily
-was found to have an ulcer 6 months ago with GI  -continue on sucralfate 1g tid and protonix 40mg daily

## 2018-09-12 NOTE — PROGRESS NOTE ADULT - PROBLEM SELECTOR PLAN 1
-admit to telemetry  -muscle weakness from deconditioning  -episodes of presyncope, transient slurred speech and multiple falls w/ LE weakness  -Orthostatics were negative; recent echo 5/2018 without significant findings  -physical therapy evaluation: likely [facility]  -Echocardiogram 5/24/18 with normal LV systolic function, no significant valve issues  -Changed hold parameters of Amlodipine for <140 SBP  -Neurology consulted - Dr. 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 Floyd -admit to telemetry  -muscle weakness from deconditioning  -episodes of presyncope, transient slurred speech and multiple falls w/ LE weakness  -Orthostatics were negative; recent echo 5/2018 without significant findings  -physical therapy evaluation: likely [facility]  -Echocardiogram 5/24/18 with normal LV systolic function, no significant valve issues  -Changed hold parameters of Amlodipine for <140 SBP  -Neurology consulted - Dr. 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

## 2018-09-12 NOTE — DISCHARGE NOTE ADULT - MEDICATION SUMMARY - MEDICATIONS TO TAKE
I will START or STAY ON the medications listed below when I get home from the hospital:    tamsulosin 0.4 mg oral capsule  -- 1 cap(s) by mouth once a day (at bedtime)  -- Indication: For BPH (benign prostatic hyperplasia)    heparin  -- 5000 unit(s) subcutaneously 2 times a day  -- Indication: For Proph    atorvastatin 10 mg oral tablet  -- 1 tab(s) by mouth once a day (at bedtime)  -- Indication: For Chol    clopidogrel 75 mg oral tablet  -- 1 tab(s) by mouth once a day  -- Indication: For Possible TIA    Xanax 1 mg oral tablet  -- 1 tab(s) by mouth 4 times a day, As Needed  -- Indication: For Anxiety    amLODIPine 10 mg oral tablet  -- 1 tab(s) by mouth once a day  -- Indication: For HTN (hypertension)    senna oral tablet  -- 2 tab(s) by mouth once a day (at bedtime), As needed, Constipation  -- Indication: For Constipation    sucralfate 1 g oral tablet  -- 1 tab(s) by mouth 3 times a day (before meals)  -- Indication: For gerd    pantoprazole 40 mg oral delayed release tablet  -- 1 tab(s) by mouth once a day  -- Indication: For gerd    buPROPion 150 mg/12 hours (SR) oral tablet, extended release  -- 1 tab(s) by mouth once a day  -- Indication: For mood    hydrALAZINE 25 mg oral tablet  -- 1 tab(s) by mouth every 6 hours, As needed, Systolic blood pressure >180  -- Indication: For HTN (hypertension)

## 2018-09-12 NOTE — PROGRESS NOTE ADULT - SUBJECTIVE AND OBJECTIVE BOX
Neurology follow up note    ANI [id number]      Interval History:    Patient feels lightheaded no spinnning     MEDICATIONS    ALPRAZolam 1 milliGRAM(s) Oral three times a day PRN  aspirin enteric coated 81 milliGRAM(s) Oral daily  atorvastatin 10 milliGRAM(s) Oral at bedtime  buPROPion XL . 150 milliGRAM(s) Oral daily  docusate sodium 100 milliGRAM(s) Oral three times a day  heparin  Injectable 5000 Unit(s) SubCutaneous every 8 hours  hydrALAZINE 25 milliGRAM(s) Oral every 6 hours PRN  pantoprazole    Tablet 40 milliGRAM(s) Oral before breakfast  senna 2 Tablet(s) Oral at bedtime PRN  sucralfate 1 Gram(s) Oral <User Schedule>  tamsulosin 0.4 milliGRAM(s) Oral at bedtime      Allergies    No Known Allergies    Intolerances            Vital Signs Last 24 Hrs  T(C): 36.6 (12 Sep 2018 08:15), Max: 36.8 (11 Sep 2018 20:53)  T(F): 97.8 (12 Sep 2018 08:15), Max: 98.3 (11 Sep 2018 20:53)  HR: 92 (12 Sep 2018 08:15) (76 - 92)  BP: 162/79 (12 Sep 2018 08:15) (162/79 - 182/78)  BP(mean): --  RR: 16 (12 Sep 2018 08:15) (16 - 17)  SpO2: 93% (12 Sep 2018 08:15) (93% - 95%)    REVIEW OF SYSTEMS:      Constitutional: No fever, chills, fatigue, weakness  Eyes: no eye pain, visual disturbances, or discharge  ENT:  No difficulty hearing, tinnitus, vertigo; No sinus or throat pain  Neck: No pain or stiffness  Respiratory: No cough, dyspnea, wheezing   Cardiovascular: No chest pain, palpitations,   Gastrointestinal: No abdominal or epigastric pain. No nausea, vomiting  No diarrhea or constipation.   Genitourinary: No dysuria, frequency, hematuria or incontinence  Neurological: No headaches, occasional  lightheadedness, no vertigo, numbness or tremors  Psychiatric: No depression, anxiety, mood swings or difficulty sleeping  Musculoskeletal: No joint pain or swelling; No muscle, back or extremity pain  Skin: No itching, burning, rashes or lesions   Lymph Nodes: No enlarged glands  Endocrine: No heat or cold intolerance; No hair loss   Allergy and Immunologic: No hives or eczema    On Neurological Examination:    The patient is awake and alert.      Extraocular movements were intact.  Pupils were equal, round, and reactive bilaterally 2 mm to 2 mm.      Positive slight forgetfulness was noted.  Speech was fluent.  Smile was symmetric.     Motor:  Bilateral upper were 4/5, bilateral lower were 4-/5, would say age-appropriate.    Follow simple commands  Follow complex commands    GENERAL Exam: Nontoxic , No Acute Distress   	  HEENT:  normocephalic, atraumatic  		  LUNGS: Clear bilaterally    	  HEART: Normal S1S2   No murmur RRR        	  GI/ ABDOMEN:  Soft  Non tender    EXTREMITIES:   No Edema  No Clubbing  No Cyanosis     MUSCULOSKELETAL: Normal Range of Motion     SKIN: Normal  No Ecchymosis                    LABS:  CBC Full  -  ( 12 Sep 2018 08:14 )  WBC Count : 5.48 K/uL  Hemoglobin : 12.2 g/dL  Hematocrit : 36.6 %  Platelet Count - Automated : 168 K/uL  Mean Cell Volume : 91.5 fl  Mean Cell Hemoglobin : 30.5 pg  Mean Cell Hemoglobin Concentration : 33.3 gm/dL  Auto Neutrophil # : x  Auto Lymphocyte # : x  Auto Monocyte # : x  Auto Eosinophil # : x  Auto Basophil # : x  Auto Neutrophil % : x  Auto Lymphocyte % : x  Auto Monocyte % : x  Auto Eosinophil % : x  Auto Basophil % : x    Urinalysis Basic - ( 10 Sep 2018 11:25 )    Color: Yellow / Appearance: Clear / S.015 / pH: x  Gluc: x / Ketone: Negative  / Bili: Negative / Urobili: Negative   Blood: x / Protein: Negative / Nitrite: Negative   Leuk Esterase: Negative / RBC: x / WBC x   Sq Epi: x / Non Sq Epi: x / Bacteria: x          145  |  113<H>  |  26<H>  ----------------------------<  95  4.0   |  26  |  1.40<H>    Ca    8.8      12 Sep 2018 08:14      Hemoglobin A1C:       Vitamin B12         RADIOLOGY    ANALYSIS AND PLAN:  This is an 88-year-old with an episode of lightheadedness, ataxia, dementia, and fall.  1.	For lightheadedness, suspect this could be possibly secondary to cerebral hypoperfusion, possibly secondary to a history of poor oral intake, subtle signs of dehydration, questionable this could be medication induced from chronic use of Antivert. Now events noted with fluctuations  of BP will check mri head questionable HTN urgency type of picture  2.	I would recommend telemetry evaluation.  3.	Monitor systolic blood pressure.  4.	For history of mild cognitive impairment versus subtle dementia, for now, I would recommend supportive therapy.  5.	I would recommend fall precautions.  6.	Questionable this could be TIA-like symptoms changes in bp. 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 spoke to spouse she agrees with plan   7.	MRA follow in 3 months aneurysm   8.	For history of depression, anxiety, continue the patient on Buspirone.  9.	For history of high cholesterol, continue the patient on a statin.  10.	Spoke with the spouse, Beverly, at  [phone]  11.	spoke to DR BEAL updated her     Physical therapy evaluation as tolerated  OOB to chair/ambulation with assistance only if possible.    Greater than 40 minutes spent in direct patient care reviewing  the notes, lab data/ imaging , discussion with multidisciplinary team.

## 2018-09-12 NOTE — PROGRESS NOTE ADULT - SUBJECTIVE AND OBJECTIVE BOX
Patient is a 88y old  Male who presents with a chief complaint of weakness (11 Sep 2018 07:19)    HPI:  87 y/o M with PMHx of BPH, TIA, anxiety, duodenal ulcer, multiple falls presents from home with weakness and pre-syncope. States that he has been dizzy for quite some time and was put on meclizine, which has not been giving him relief. Saw a female neurologist on Friday, as he "thought he had a brain tumor." Neurology told the patient that meclizine may have been worsening his symptoms. States that his dizziness is worse in the morning upon getting up from bed. Lives with his wife. States that he was ambulating with his walker and ended up seated on the floor. Was unable to pull himself up and his wife was unable to get him up so she called EMS. States that he was recently in rehab about 5 weeks ago. States that he had been walking 2-3 blocks as recently as last week but had since not done so the last few days. States that he still took his meclizine the last two days. Had some slurring of his speech, which has resolved. He has had similar presentations with falls over the past few weeks with weakness in his legs and slurring of speech. Currently still admitting to dizziness. Of note, has long standing anxiety for which he takes xanax 2-3 times a day, standing. Was taking it TID for a long time and recently tried dropping down to BID, with no withdrawal symptoms. States that he had withdrawal from stopping xanax for 5 days at one time when he ran out of his prescription, this event resolved after taking xanax. Denies CP, palpitations, dyspnea, FINE, PND, abdominal pain, n/v/d, dysuria or hematuria.    In the ED, VSS. Labs significant for H/H 12.2/36.2, BUN/Cr of 32/1.50. UA negative. CT head negative. CXR no acute pulmonary process, large hiatal hernia. XR Right Hip/Pelvis, Right Femur, Left Hand were negative for acute dislocation or fracture. EKG: NSR, normal axis, no ST abnormality. (10 Sep 2018 14:05)      INTERVAL HPI:   No overnight events. Patient was seen and examined at bedside. Patient has no acute complaints and states that he is feeling better, but remains frustrated because no clear answer as to why he is getting these episodes of dizziness and weakness. Denies any headaches, dizziness, symptoms of vertigo, confusion, slurred speech, or right sided weakness. No CP, palpitations, SOB, or dyspnea. No focal deficits.       MEDICATIONS  (STANDING):  atorvastatin 10 milliGRAM(s) Oral at bedtime  buPROPion XL . 150 milliGRAM(s) Oral daily  clopidogrel Tablet 75 milliGRAM(s) Oral daily  docusate sodium 100 milliGRAM(s) Oral three times a day  heparin  Injectable 5000 Unit(s) SubCutaneous every 8 hours  pantoprazole    Tablet 40 milliGRAM(s) Oral before breakfast  sucralfate 1 Gram(s) Oral <User Schedule>  tamsulosin 0.4 milliGRAM(s) Oral at bedtime    MEDICATIONS  (PRN):  ALPRAZolam 1 milliGRAM(s) Oral three times a day PRN anxiety  hydrALAZINE 25 milliGRAM(s) Oral every 6 hours PRN Systolic blood pressure >180  senna 2 Tablet(s) Oral at bedtime PRN Constipation        Allergies    No Known Allergies    Intolerances        REVIEW OF SYSTEMS:  CONSTITUTIONAL: No fever, No chills, No fatigue  EYES: No eye pain, visual disturbances, or discharge  ENMT:  No ear pain, No nose bleed, No vertigo; No sinus or throat pain, No Congestion  NECK: No pain, No stiffness  RESPIRATORY: No cough, wheezing, No hemoptysis, No shortness of breath  CARDIOVASCULAR: No chest pain, palpitations  GASTROINTESTINAL: No abdominal or epigastric pain. No nausea, No vomiting;   GENITOURINARY: No dysuria, No frequency, No urgency, No hematuria, or incontinence  NEUROLOGICAL: No headaches, No dizziness, No numbness, No tingling, No tremors, No weakness  EXT: No Swelling, No Pain, No Edema  SKIN:  No itching, burning, rashes, or lesions   MUSCULOSKELETAL: No joint pain or swelling; No muscle pain, No back pain, No extremity pain  PSYCHIATRIC: No depression, anxiety, mood swings or difficulty sleeping at night      ICU Vital Signs Last 24 Hrs  T(C): 36.6 (12 Sep 2018 08:15), Max: 36.8 (11 Sep 2018 20:53)  T(F): 97.8 (12 Sep 2018 08:15), Max: 98.3 (11 Sep 2018 20:53)  HR: 88 (12 Sep 2018 11:23) (76 - 92)  BP: 180/90 (12 Sep 2018 11:37) (157/90 - 185/74)  BP(mean): --  ABP: --  ABP(mean): --  RR: 17 (12 Sep 2018 10:57) (16 - 17)  SpO2: 96% (12 Sep 2018 10:57) (93% - 96%)    --------------------------------------------------------  IN: 0 mL / OUT: 1000 mL / NET: -1000 mL        PHYSICAL EXAM:  GENERAL:  NAD, well appearing  HEAD:  Normocephalic, atraumatic  EYES:  EOMI, PERRLA, conjunctiva and sclera clear normal  ENMT: Moist mucous membranes, good dentition  NECK: Supple; No JVD, Normal thyroid, no Lymphadenopathy  NERVOUS SYSTEM: Alert & Oriented; No RUE or RLE weakness/ drift  CHEST/LUNG: clear to auscultation bilaterally, No rales, No rhonchi, No wheezing  HEART:  Regular rate and rhythm, No murmurs, rubs, or gallops  ABDOMEN: Soft, Nontender, Nondistended, Bowel sounds present  EXTREMITIES: 2+ Peripheral Pulses, No clubbing, cyanosis, or edema  LYMPH: No lymphadenopathy noted  SKIN:  No rashes or lesions      LABS:                        12.3   5.01  )-----------( 167      ( 11 Sep 2018 08:49 )             36.7     11 Sep 2018 08:49    146    |  112    |  24     ----------------------------<  94     3.5     |  25     |  1.20     Ca    8.7        11 Sep 2018 08:49    TPro  6.1    /  Alb  3.2    /  TBili  0.5    /  DBili  x      /  AST  19     /  ALT  25     /  AlkPhos  71     10 Sep 2018 10:12    PT/INR - ( 10 Sep 2018 10:12 )   PT: 11.9 sec;   INR: 1.09 ratio         PTT - ( 10 Sep 2018 10:12 )  PTT:30.7 sec  Urinalysis Basic - ( 10 Sep 2018 11:25 )    Color: Yellow / Appearance: Clear / S.015 / pH: x  Gluc: x / Ketone: Negative  / Bili: Negative / Urobili: Negative   Blood: x / Protein: Negative / Nitrite: Negative   Leuk Esterase: Negative / RBC: x / WBC x   Sq Epi: x / Non Sq Epi: x / Bacteria: x          RADIOLOGY & ADDITIONAL TESTS:    < from: MR Angio Head No Cont (18 @ 13:29) >    EXAM:  MR ANGIO BRAIN                          EXAM:  MR BRAIN                            PROCEDURE DATE:  2018        INTERPRETATION:  .    CLINICAL INFORMATION: Right-sided hand weakness and slurred speech.   Transient ischemic attack.      IMPRESSION:    MRI brain: No acute intracranial hemorrhage or evidence of acute ischemia.    Multiple nonspecific abnormal white matter foci of T2/FLAIR prolongation   statistically favoring microvascular disease.    Nonspecific right-sided mastoid air cell effusion. Correlate for possible   right-sided mastoiditis.    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 Recommend serial imaging over time   to assess for stability or change.    Moderate focal stenosis involving a proximal left M2 branch at the   bifurcation.    Otherwiseunremarkable examination with anatomic variation, as discussed.          RJ BARRAGAN M.D., ATTENDING RADIOLOGIST  This document has been electronically signed. Sep 11 2018  2:02PM           < end of copied text >      < from: US Duplex Carotid Arteries Complete, Bilateral (18 @ 12:43) >    EXAM:  US DPLX CAROTIDS COMPL BI                            PROCEDURE DATE:  2018          INTERPRETATION:  Carotid Ultrasound    Indication: TIA    IMPRESSION:    Mild to moderate bilateral multifocal plaque.    No elevated velocities to suggest hemodynamically significant stenosis.    Measurement of carotid stenosis is based on velocity parameters that   correlate the residual internal carotid diameter with that of the more   distal vessel in accordance with a method such as the [place] American   Symptomatic Carotid Endarterectomy Trial (NASCET).          EFRAÍN JAMES M.D., ATTENDING RADIOLOGIST  This document has been electronically signed. Sep 11 2018 12:48PM                < end of copied text >        < end of copied text >        HEALTH ISSUES - PROBLEM Dx:  Need for prophylactic measure: Need for prophylactic measure  Duodenal ulcer: Duodenal ulcer  BPH (benign prostatic hyperplasia): BPH (benign prostatic hyperplasia)  Anxiety: Anxiety  TIA (transient ischemic attack): TIA (transient ischemic attack)  CKD (chronic kidney disease), stage III: CKD (chronic kidney disease), stage III  Pre-syncope: Pre-syncope  Unable to ambulate: Unable to ambulate  Weakness: Weakness      Consultant(s) Notes Reviewed:  [ x ] YES     Care Discussed with [X] Consultants  [  ] Patient  [  ] Family  [  ]   [  ] Social Service  [  ] RN, [  ] Physical Therapy  DVT PPX: [  ] Lovenox, [  ] S C Heparin, [x  ] Coumadin, [  ] Xarelto, [  ] Eliquis, [  ] SCD   Advanced directive: [  ] None, [  ] DNR/DNI Patient is a 88y old  Male who presents with a chief complaint of weakness (11 Sep 2018 07:19)    HPI:  87 y/o M with PMHx of BPH, TIA, anxiety, duodenal ulcer, multiple falls presents from home with weakness and pre-syncope. States that he has been dizzy for quite some time and was put on meclizine, which has not been giving him relief. Saw a female neurologist on Friday, as he "thought he had a brain tumor." Neurology told the patient that meclizine may have been worsening his symptoms. States that his dizziness is worse in the morning upon getting up from bed. Lives with his wife. States that he was ambulating with his walker and ended up seated on the floor. Was unable to pull himself up and his wife was unable to get him up so she called EMS. States that he was recently in rehab about 5 weeks ago. States that he had been walking 2-3 blocks as recently as last week but had since not done so the last few days. States that he still took his meclizine the last two days. Had some slurring of his speech, which has resolved. He has had similar presentations with falls over the past few weeks with weakness in his legs and slurring of speech. Currently still admitting to dizziness. Of note, has long standing anxiety for which he takes xanax 2-3 times a day, standing. Was taking it TID for a long time and recently tried dropping down to BID, with no withdrawal symptoms. States that he had withdrawal from stopping xanax for 5 days at one time when he ran out of his prescription, this event resolved after taking xanax. Denies CP, palpitations, dyspnea, FINE, PND, abdominal pain, n/v/d, dysuria or hematuria.    In the ED, VSS. Labs significant for H/H 12.2/36.2, BUN/Cr of 32/1.50. UA negative. CT head negative. CXR no acute pulmonary process, large hiatal hernia. XR Right Hip/Pelvis, Right Femur, Left Hand were negative for acute dislocation or fracture. EKG: NSR, normal axis, no ST abnormality. (10 Sep 2018 14:05)      INTERVAL HPI:   No overnight events. Patient seen and examined at bedside. No acute complaints. Patient states that he is feeling better, but is slightly frustrated because he hasn't been given a definitive answer as to why he is getting these episodes of dizziness and weakness. Denies any headaches, dizziness, symptoms of vertigo, confusion, slurred speech, right sided weakness, or focal neurological deficits. No CP, palpitations, SOB, or dyspnea.      MEDICATIONS  (STANDING):  atorvastatin 10 milliGRAM(s) Oral at bedtime  buPROPion XL . 150 milliGRAM(s) Oral daily  clopidogrel Tablet 75 milliGRAM(s) Oral daily  docusate sodium 100 milliGRAM(s) Oral three times a day  heparin  Injectable 5000 Unit(s) SubCutaneous every 8 hours  pantoprazole    Tablet 40 milliGRAM(s) Oral before breakfast  sucralfate 1 Gram(s) Oral <User Schedule>  tamsulosin 0.4 milliGRAM(s) Oral at bedtime    MEDICATIONS  (PRN):  ALPRAZolam 1 milliGRAM(s) Oral three times a day PRN anxiety  hydrALAZINE 25 milliGRAM(s) Oral every 6 hours PRN Systolic blood pressure >180  senna 2 Tablet(s) Oral at bedtime PRN Constipation        Allergies    No Known Allergies    Intolerances        REVIEW OF SYSTEMS:  CONSTITUTIONAL: No fever, No chills, No fatigue  EYES: No eye pain, visual disturbances, or discharge  ENMT:  No ear pain, No nose bleed, No vertigo; No sinus or throat pain, No Congestion  NECK: No pain, No stiffness  RESPIRATORY: No cough, wheezing, No hemoptysis, No shortness of breath  CARDIOVASCULAR: No chest pain, palpitations  GASTROINTESTINAL: No abdominal or epigastric pain. No nausea, No vomiting;   GENITOURINARY: No dysuria, No frequency, No urgency, No hematuria, or incontinence  NEUROLOGICAL: No headaches, No dizziness, No numbness, No tingling, No tremors, No weakness  EXT: No Swelling, No Pain, No Edema  SKIN:  No itching, burning, rashes, or lesions   MUSCULOSKELETAL: No joint pain or swelling; No muscle pain, No back pain, No extremity pain  PSYCHIATRIC: No depression, anxiety, mood swings or difficulty sleeping at night      ICU Vital Signs Last 24 Hrs  T(C): 36.6 (12 Sep 2018 08:15), Max: 36.8 (11 Sep 2018 20:53)  T(F): 97.8 (12 Sep 2018 08:15), Max: 98.3 (11 Sep 2018 20:53)  HR: 88 (12 Sep 2018 11:23) (76 - 92)  BP: 180/90 (12 Sep 2018 11:37) (157/90 - 185/74)  BP(mean): --  ABP: --  ABP(mean): --  RR: 17 (12 Sep 2018 10:57) (16 - 17)  SpO2: 96% (12 Sep 2018 10:57) (93% - 96%)    --------------------------------------------------------  IN: 0 mL / OUT: 1000 mL / NET: -1000 mL        PHYSICAL EXAM:  GENERAL:  NAD, well appearing  HEAD:  Normocephalic, atraumatic  EYES:  EOMI, PERRLA, conjunctiva and sclera clear normal  ENMT: Moist mucous membranes, good dentition  NECK: Supple; No JVD, Normal thyroid, no Lymphadenopathy  NERVOUS SYSTEM: Alert & Oriented; No RUE or RLE weakness/ drift;  CHEST/LUNG: clear to auscultation bilaterally, No rales, No rhonchi, No wheezing  HEART:  Regular rate and rhythm, No murmurs, rubs, or gallops  ABDOMEN: Soft, Nontender, Nondistended, Bowel sounds present  EXTREMITIES: 2+ Peripheral Pulses, No clubbing, cyanosis, or edema  LYMPH: No lymphadenopathy noted  SKIN:  No rashes or lesions      LABS:                        12.3   5.01  )-----------( 167      ( 11 Sep 2018 08:49 )             36.7     11 Sep 2018 08:49    146    |  112    |  24     ----------------------------<  94     3.5     |  25     |  1.20     Ca    8.7        11 Sep 2018 08:49    TPro  6.1    /  Alb  3.2    /  TBili  0.5    /  DBili  x      /  AST  19     /  ALT  25     /  AlkPhos  71     10 Sep 2018 10:12    PT/INR - ( 10 Sep 2018 10:12 )   PT: 11.9 sec;   INR: 1.09 ratio         PTT - ( 10 Sep 2018 10:12 )  PTT:30.7 sec  Urinalysis Basic - ( 10 Sep 2018 11:25 )    Color: Yellow / Appearance: Clear / S.015 / pH: x  Gluc: x / Ketone: Negative  / Bili: Negative / Urobili: Negative   Blood: x / Protein: Negative / Nitrite: Negative   Leuk Esterase: Negative / RBC: x / WBC x   Sq Epi: x / Non Sq Epi: x / Bacteria: x          RADIOLOGY & ADDITIONAL TESTS:    < from: MR Angio Head No Cont (18 @ 13:29) >    EXAM:  MR ANGIO BRAIN                          EXAM:  MR BRAIN                            PROCEDURE DATE:  2018        INTERPRETATION:  .    CLINICAL INFORMATION: Right-sided hand weakness and slurred speech.   Transient ischemic attack.      IMPRESSION:    MRI brain: No acute intracranial hemorrhage or evidence of acute ischemia.    Multiple nonspecific abnormal white matter foci of T2/FLAIR prolongation   statistically favoring microvascular disease.    Nonspecific right-sided mastoid air cell effusion. Correlate for possible   right-sided mastoiditis.    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 Recommend serial imaging over time   to assess for stability or change.    Moderate focal stenosis involving a proximal left M2 branch at the   bifurcation.    Otherwiseunremarkable examination with anatomic variation, as discussed.          RJ BARRAGAN M.D., ATTENDING RADIOLOGIST  This document has been electronically signed. Sep 11 2018  2:02PM           < end of copied text >      < from: US Duplex Carotid Arteries Complete, Bilateral (18 @ 12:43) >    EXAM:  US DPLX CAROTIDS COMPL BI                            PROCEDURE DATE:  2018          INTERPRETATION:  Carotid Ultrasound    Indication: TIA    IMPRESSION:    Mild to moderate bilateral multifocal plaque.    No elevated velocities to suggest hemodynamically significant stenosis.    Measurement of carotid stenosis is based on velocity parameters that   correlate the residual internal carotid diameter with that of the more   distal vessel in accordance with a method such as the [place] American   Symptomatic Carotid Endarterectomy Trial (NASCET).          EFRAÍN JAMES M.D., ATTENDING RADIOLOGIST  This document has been electronically signed. Sep 11 2018 12:48PM                < end of copied text >        < end of copied text >        HEALTH ISSUES - PROBLEM Dx:  Need for prophylactic measure: Need for prophylactic measure  Duodenal ulcer: Duodenal ulcer  BPH (benign prostatic hyperplasia): BPH (benign prostatic hyperplasia)  Anxiety: Anxiety  TIA (transient ischemic attack): TIA (transient ischemic attack)  CKD (chronic kidney disease), stage III: CKD (chronic kidney disease), stage III  Pre-syncope: Pre-syncope  Unable to ambulate: Unable to ambulate  Weakness: Weakness      Consultant(s) Notes Reviewed:  [ x ] YES     Care Discussed with [X] Consultants  [  ] Patient  [  ] Family  [  ]   [  ] Social Service  [  ] RN, [  ] Physical Therapy  DVT PPX: [  ] Lovenox, [  ] S C Heparin, [x  ] Coumadin, [  ] Xarelto, [  ] Eliquis, [  ] SCD   Advanced directive: [  ] None, [  ] DNR/DNI Patient is a 88y old  Male who presents with a chief complaint of weakness (11 Sep 2018 07:19)    HPI:  89 y/o M with PMHx of BPH, TIA, anxiety, duodenal ulcer, multiple falls presents from home with weakness and pre-syncope. States that he has been dizzy for quite some time and was put on meclizine, which has not been giving him relief. Saw a female neurologist on Friday, as he "thought he had a brain tumor." Neurology told the patient that meclizine may have been worsening his symptoms. States that his dizziness is worse in the morning upon getting up from bed. Lives with his wife. States that he was ambulating with his walker and ended up seated on the floor. Was unable to pull himself up and his wife was unable to get him up so she called EMS. States that he was recently in rehab about 5 weeks ago. States that he had been walking 2-3 blocks as recently as last week but had since not done so the last few days. States that he still took his meclizine the last two days. Had some slurring of his speech, which has resolved. He has had similar presentations with falls over the past few weeks with weakness in his legs and slurring of speech. Currently still admitting to dizziness. Of note, has long standing anxiety for which he takes xanax 2-3 times a day, standing. Was taking it TID for a long time and recently tried dropping down to BID, with no withdrawal symptoms. States that he had withdrawal from stopping xanax for 5 days at one time when he ran out of his prescription, this event resolved after taking xanax. Denies CP, palpitations, dyspnea, FINE, PND, abdominal pain, n/v/d, dysuria or hematuria.    In the ED, VSS. Labs significant for H/H 12.2/36.2, BUN/Cr of 32/1.50. UA negative. CT head negative. CXR no acute pulmonary process, large hiatal hernia. XR Right Hip/Pelvis, Right Femur, Left Hand were negative for acute dislocation or fracture. EKG: NSR, normal axis, no ST abnormality. (10 Sep 2018 14:05)      INTERVAL HPI:   No overnight events. Patient seen and examined at bedside. No acute complaints. Patient states that he is feeling better, but is slightly frustrated because he hasn't been given a definitive answer as to why he is getting these episodes of dizziness and weakness. Denies any headaches, dizziness, symptoms of vertigo, confusion, slurred speech, right sided weakness, or focal neurological deficits. No CP, palpitations, SOB, or dyspnea...      MEDICATIONS  (STANDING):  atorvastatin 10 milliGRAM(s) Oral at bedtime  buPROPion XL . 150 milliGRAM(s) Oral daily  clopidogrel Tablet 75 milliGRAM(s) Oral daily  docusate sodium 100 milliGRAM(s) Oral three times a day  heparin  Injectable 5000 Unit(s) SubCutaneous every 8 hours  pantoprazole    Tablet 40 milliGRAM(s) Oral before breakfast  sucralfate 1 Gram(s) Oral <User Schedule>  tamsulosin 0.4 milliGRAM(s) Oral at bedtime    MEDICATIONS  (PRN):  ALPRAZolam 1 milliGRAM(s) Oral three times a day PRN anxiety  hydrALAZINE 25 milliGRAM(s) Oral every 6 hours PRN Systolic blood pressure >180  senna 2 Tablet(s) Oral at bedtime PRN Constipation        Allergies    No Known Allergies    Intolerances        REVIEW OF SYSTEMS:  CONSTITUTIONAL: No fever, No chills, No fatigue  EYES: No eye pain, visual disturbances, or discharge  ENMT:  No ear pain, No nose bleed, No vertigo; No sinus or throat pain, No Congestion  NECK: No pain, No stiffness  RESPIRATORY: No cough, wheezing, No hemoptysis, No shortness of breath  CARDIOVASCULAR: No chest pain, palpitations  GASTROINTESTINAL: No abdominal or epigastric pain. No nausea, No vomiting;   GENITOURINARY: No dysuria, No frequency, No urgency, No hematuria, or incontinence  NEUROLOGICAL: No headaches, No dizziness, No numbness, No tingling, No tremors, No weakness  EXT: No Swelling, No Pain, No Edema  SKIN:  No itching, burning, rashes, or lesions   MUSCULOSKELETAL: No joint pain or swelling; No muscle pain, No back pain, No extremity pain  PSYCHIATRIC: No depression, anxiety, mood swings or difficulty sleeping at night      ICU Vital Signs Last 24 Hrs  T(C): 36.6 (12 Sep 2018 08:15), Max: 36.8 (11 Sep 2018 20:53)  T(F): 97.8 (12 Sep 2018 08:15), Max: 98.3 (11 Sep 2018 20:53)  HR: 88 (12 Sep 2018 11:23) (76 - 92)  BP: 180/90 (12 Sep 2018 11:37) (157/90 - 185/74)  BP(mean): --  ABP: --  ABP(mean): --  RR: 17 (12 Sep 2018 10:57) (16 - 17)  SpO2: 96% (12 Sep 2018 10:57) (93% - 96%)    --------------------------------------------------------  IN: 0 mL / OUT: 1000 mL / NET: -1000 mL        PHYSICAL EXAM:  GENERAL:  NAD, well appearing  HEAD:  Normocephalic, atraumatic  EYES:  EOMI, PERRLA, conjunctiva and sclera clear normal  ENMT: Moist mucous membranes, good dentition  NECK: Supple; No JVD, Normal thyroid, no Lymphadenopathy  NERVOUS SYSTEM: Alert & Oriented; No RUE or RLE weakness/ drift;  CHEST/LUNG: clear to auscultation bilaterally, No rales, No rhonchi, No wheezing  HEART:  Regular rate and rhythm, No murmurs, rubs, or gallops  ABDOMEN: Soft, Nontender, Nondistended, Bowel sounds present  EXTREMITIES: 2+ Peripheral Pulses, No clubbing, cyanosis, or edema  LYMPH: No lymphadenopathy noted  SKIN:  No rashes or lesions      LABS:                        12.3   5.01  )-----------( 167      ( 11 Sep 2018 08:49 )             36.7     11 Sep 2018 08:49    146    |  112    |  24     ----------------------------<  94     3.5     |  25     |  1.20     Ca    8.7        11 Sep 2018 08:49    TPro  6.1    /  Alb  3.2    /  TBili  0.5    /  DBili  x      /  AST  19     /  ALT  25     /  AlkPhos  71     10 Sep 2018 10:12    PT/INR - ( 10 Sep 2018 10:12 )   PT: 11.9 sec;   INR: 1.09 ratio         PTT - ( 10 Sep 2018 10:12 )  PTT:30.7 sec  Urinalysis Basic - ( 10 Sep 2018 11:25 )    Color: Yellow / Appearance: Clear / S.015 / pH: x  Gluc: x / Ketone: Negative  / Bili: Negative / Urobili: Negative   Blood: x / Protein: Negative / Nitrite: Negative   Leuk Esterase: Negative / RBC: x / WBC x   Sq Epi: x / Non Sq Epi: x / Bacteria: x          RADIOLOGY & ADDITIONAL TESTS:    < from: MR Angio Head No Cont (18 @ 13:29) >    EXAM:  MR ANGIO BRAIN                          EXAM:  MR BRAIN                            PROCEDURE DATE:  2018        INTERPRETATION:  .    CLINICAL INFORMATION: Right-sided hand weakness and slurred speech.   Transient ischemic attack.      IMPRESSION:    MRI brain: No acute intracranial hemorrhage or evidence of acute ischemia.    Multiple nonspecific abnormal white matter foci of T2/FLAIR prolongation   statistically favoring microvascular disease.    Nonspecific right-sided mastoid air cell effusion. Correlate for possible   right-sided mastoiditis.    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 Recommend serial imaging over time   to assess for stability or change.    Moderate focal stenosis involving a proximal left M2 branch at the   bifurcation.    Otherwiseunremarkable examination with anatomic variation, as discussed.          RJ BARRAGAN M.D., ATTENDING RADIOLOGIST  This document has been electronically signed. Sep 11 2018  2:02PM           < end of copied text >      < from: US Duplex Carotid Arteries Complete, Bilateral (18 @ 12:43) >    EXAM:  US DPLX CAROTIDS COMPL BI                            PROCEDURE DATE:  2018          INTERPRETATION:  Carotid Ultrasound    Indication: TIA    IMPRESSION:    Mild to moderate bilateral multifocal plaque.    No elevated velocities to suggest hemodynamically significant stenosis.    Measurement of carotid stenosis is based on velocity parameters that   correlate the residual internal carotid diameter with that of the more   distal vessel in accordance with a method such as the North American   Symptomatic Carotid Endarterectomy Trial (NASCET).          EFRAÍN JAMES M.D., ATTENDING RADIOLOGIST  This document has been electronically signed. Sep 11 2018 12:48PM                < end of copied text >        < end of copied text >        HEALTH ISSUES - PROBLEM Dx:  Need for prophylactic measure: Need for prophylactic measure  Duodenal ulcer: Duodenal ulcer  BPH (benign prostatic hyperplasia): BPH (benign prostatic hyperplasia)  Anxiety: Anxiety  TIA (transient ischemic attack): TIA (transient ischemic attack)  CKD (chronic kidney disease), stage III: CKD (chronic kidney disease), stage III  Pre-syncope: Pre-syncope  Unable to ambulate: Unable to ambulate  Weakness: Weakness      Consultant(s) Notes Reviewed:  [ x ] YES     Care Discussed with [X] Consultants  [  ] Patient  [  ] Family  [  ]   [  ] Social Service  [  ] RN, [  ] Physical Therapy  DVT PPX: [  ] Lovenox, [  ] S C Heparin, [x  ] Coumadin, [  ] Xarelto, [  ] Eliquis, [  ] SCD   Advanced directive: [  ] None, [  ] DNR/DNI Patient is a 88y old  Male who presents with a chief complaint of weakness (11 Sep 2018 07:19)    HPI:  87 y/o M with PMHx of BPH, TIA, anxiety, duodenal ulcer, multiple falls presents from home with weakness and pre-syncope. States that he has been dizzy for quite some time and was put on meclizine, which has not been giving him relief. Saw a female neurologist on Friday, as he "thought he had a brain tumor." Neurology told the patient that meclizine may have been worsening his symptoms. States that his dizziness is worse in the morning upon getting up from bed. Lives with his wife. States that he was ambulating with his walker and ended up seated on the floor. Was unable to pull himself up and his wife was unable to get him up so she called EMS. States that he was recently in rehab about 5 weeks ago. States that he had been walking 2-3 blocks as recently as last week but had since not done so the last few days. States that he still took his meclizine the last two days. Had some slurring of his speech, which has resolved. He has had similar presentations with falls over the past few weeks with weakness in his legs and slurring of speech. Currently still admitting to dizziness. Of note, has long standing anxiety for which he takes xanax 2-3 times a day, standing. Was taking it TID for a long time and recently tried dropping down to BID, with no withdrawal symptoms. States that he had withdrawal from stopping xanax for 5 days at one time when he ran out of his prescription, this event resolved after taking xanax. Denies CP, palpitations, dyspnea, FINE, PND, abdominal pain, n/v/d, dysuria or hematuria.    In the ED, VSS. Labs significant for H/H 12.2/36.2, BUN/Cr of 32/1.50. UA negative. CT head negative. CXR no acute pulmonary process, large hiatal hernia. XR Right Hip/Pelvis, Right Femur, Left Hand were negative for acute dislocation or fracture. EKG: NSR, normal axis, no ST abnormality. (10 Sep 2018 14:05)      INTERVAL HPI:   No acute overnight events or rapids. Patient seen and examined at bedside. No acute complaints. Patient states that he is feeling better, but is slightly frustrated because he hasn't been given a definitive answer as to why he is getting these episodes of dizziness and weakness. Denies any headaches, dizziness, symptoms of vertigo, confusion, slurred speech, right sided weakness, or focal neurological deficits. No CP, palpitations, SOB, or dyspnea  Later approximately at noon, called by RN who stated that patient became dizzy when moved to chair. Patient seen and evaluated, strength intact, but slight slur in voice noted, however, not as bad as yesterday. BP still trending in 180s systolic. Amlodipine changed to 10 mg daily. PO hydration encouraged. 250cc bolus ordered. Will consider addition of mitogen if upright BP stabilizes. Encourage upright in bed.    MEDICATIONS  (STANDING):  atorvastatin 10 milliGRAM(s) Oral at bedtime  buPROPion XL . 150 milliGRAM(s) Oral daily  clopidogrel Tablet 75 milliGRAM(s) Oral daily  docusate sodium 100 milliGRAM(s) Oral three times a day  heparin  Injectable 5000 Unit(s) SubCutaneous every 8 hours  pantoprazole    Tablet 40 milliGRAM(s) Oral before breakfast  sodium chloride 0.9% Bolus 250 milliLiter(s) IV Bolus once  sucralfate 1 Gram(s) Oral <User Schedule>  tamsulosin 0.4 milliGRAM(s) Oral at bedtime    MEDICATIONS  (PRN):  ALPRAZolam 1 milliGRAM(s) Oral three times a day PRN anxiety  hydrALAZINE 25 milliGRAM(s) Oral every 6 hours PRN Systolic blood pressure >180  senna 2 Tablet(s) Oral at bedtime PRN Constipation    Allergies    No Known Allergies    Intolerances    REVIEW OF SYSTEMS:  CONSTITUTIONAL: LE WEAKNESS, No fever, No chills, No fatigue  EYES: No eye pain, visual disturbances, or discharge  ENMT:  No ear pain, No nose bleed, No vertigo; No sinus or throat pain, No Congestion  NECK: No pain, No stiffness  RESPIRATORY: No cough, wheezing, No hemoptysis, No shortness of breath  CARDIOVASCULAR: No chest pain, palpitations  GASTROINTESTINAL: No abdominal or epigastric pain. No nausea, No vomiting;   GENITOURINARY: No dysuria, No frequency, No urgency, No hematuria, or incontinence  NEUROLOGICAL: SLIGHT VOCAL SLURRING, No headaches, No dizziness, No numbness, No tingling, No tremors, No weakness  EXT: No Swelling, No Pain, No Edema  SKIN:  No itching, burning, rashes, or lesions   MUSCULOSKELETAL: No joint pain or swelling; No muscle pain, No back pain, No extremity pain  PSYCHIATRIC: No depression, anxiety, mood swings or difficulty sleeping at night    Vital Signs Last 24 Hrs  T(C): 36.6 (12 Sep 2018 08:15), Max: 36.8 (11 Sep 2018 20:53)  T(F): 97.8 (12 Sep 2018 08:15), Max: 98.3 (11 Sep 2018 20:53)  HR: 88 (12 Sep 2018 11:23) (76 - 92)  BP: 180/90 (12 Sep 2018 11:37) (157/90 - 185/74)  BP(mean): --  RR: 17 (12 Sep 2018 10:57) (16 - 17)  SpO2: 96% (12 Sep 2018 10:57) (93% - 96%)    I&O's Summary    11 Sep 2018 07:01  -  12 Sep 2018 07:00  --------------------------------------------------------  IN: 0 mL / OUT: 200 mL / NET: -200 mL    12 Sep 2018 07:01  -  12 Sep 2018 12:48  --------------------------------------------------------  IN: 360 mL / OUT: 0 mL / NET: 360 mL        PHYSICAL EXAM:  GENERAL:  NAD, well appearing  HEAD:  Normocephalic, atraumatic  EYES:  EOMI, PERRLA, conjunctiva and sclera clear normal  ENMT: Moist mucous membranes, good dentition  NECK: Supple; No JVD, Normal thyroid, no Lymphadenopathy  NERVOUS SYSTEM: VOCAL SLURRING, IMPROVED. Alert & Oriented; No RUE or RLE weakness/ drift;  CHEST/LUNG: clear to auscultation bilaterally, No rales, No rhonchi, No wheezing  HEART:  Regular rate and rhythm, No murmurs, rubs, or gallops  ABDOMEN: Soft, Nontender, Nondistended, Bowel sounds present  EXTREMITIES: 2+ Peripheral Pulses, No clubbing, cyanosis, or edema  LYMPH: No lymphadenopathy noted  SKIN:  No rashes or lesions      LABS    (09-12 @ 08:14)                      12.2  5.48 )-----------( 168                 36.6    Neutrophils = -- (--%)  Lymphocytes = -- (--%)  Eosinophils = -- (--%)  Basophils = -- (--%)  Monocytes = -- (--%)  Bands = --%    09-12    145  |  113<H>  |  26<H>  ----------------------------<  95  4.0   |  26  |  1.40<H>    Ca    8.8      12 Sep 2018 08:14        IMAGING    EXAM:  MR ANGIO BRAIN                        EXAM:  MR BRAIN                        PROCEDURE DATE:  09/11/2018    INTERPRETATION:  .  CLINICAL INFORMATION: Right-sided hand weakness and slurred speech.   Transient ischemic attack.  IMPRESSION:  MRI brain: No acute intracranial hemorrhage or evidence of acute ischemia.  Multiple nonspecific abnormal white matter foci of T2/FLAIR prolongation   statistically favoring microvascular disease.  Nonspecific right-sided mastoid air cell effusion. Correlate for possible   right-sided mastoiditis.  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 Recommend serial imaging over time   to assess for stability or change.  Moderate focal stenosis involving a proximal left M2 branch at the   bifurcation.  Otherwiseunremarkable examination with anatomic variation, as discussed.        EXAM:  US DPLX CAROTIDS COMPL BI                        PROCEDURE DATE:  09/11/2018    INTERPRETATION:  Carotid Ultrasound  Indication: TIA  IMPRESSION:  Mild to moderate bilateral multifocal plaque.  No elevated velocities to suggest hemodynamically significant stenosis.  Measurement of carotid stenosis is based on velocity parameters that   correlate the residual internal carotid diameter with that of the more   distal vessel in accordance with a method such as the North American   Symptomatic Carotid Endarterectomy Trial (NASCET).        HEALTH ISSUES - PROBLEM Dx:  Need for prophylactic measure: Need for prophylactic measure  Duodenal ulcer: Duodenal ulcer  BPH (benign prostatic hyperplasia): BPH (benign prostatic hyperplasia)  Anxiety: Anxiety  TIA (transient ischemic attack): TIA (transient ischemic attack)  CKD (chronic kidney disease), stage III: CKD (chronic kidney disease), stage III  Pre-syncope: Pre-syncope  Unable to ambulate: Unable to ambulate  Weakness: Weakness      Consultant(s) Notes Reviewed:  [ x ] YES     Care Discussed with [X] Consultants  [  ] Patient  [  ] Family  [  ]   [  ] Social Service  [  ] RN, [  ] Physical Therapy  DVT PPX: [  ] Lovenox, [ X ] S C Heparin, [  ] Coumadin, [  ] Xarelto, [  ] Eliquis, [  ] SCD   Advanced directive: [  ] None, [  ] DNR/DNI

## 2018-09-12 NOTE — PROGRESS NOTE ADULT - PROBLEM SELECTOR PLAN 6
-continue buproprion 150mg daily  -continue xanax 1mg TID PRN (prescribed for 1mg qid PRN, has been taking less)
continue buproprion 150mg daily  continue xanax 1mg TID PRN (prescribed for 1mg qid PRN, has been taking less)
-continue buproprion 150mg daily  -continue xanax 1mg TID PRN (prescribed for 1mg qid PRN, has been taking less)

## 2018-09-12 NOTE — PROGRESS NOTE ADULT - PROBLEM SELECTOR PLAN 3
-from cerebral hypoperfusion; CT negative for acute pathology  -mild QTc prolongation on EKG, without other predisposing features for syncope  -will hold meclizine per neurology recommendations  -monitor for arrhythmia on telemetry  -Orthostatic vitals were negative, repeated again on 9/11/18, again negative  -Cardiology consulted - Dr. Christ Carter  -Patient did not receive amlopidine yesterday; BP was elevated this morning (182/78). Per cardio, increase amlopidine to 10 mg starting tomorrow. Continue hydralazine 25 mg PO q6h prn SBP > 180. -from cerebral hypoperfusion; CT negative for acute pathology  -mild QTc prolongation on EKG, without other predisposing features for syncope  -will hold meclizine per neurology recommendations  -monitor for arrhythmia on telemetry  -Orthostatic vitals were negative, repeated again on 9/11/18, again negative  -Cardiology consulted - Dr. Christ Carter  -Patient did not receive amlopidine yesterday due to hold precautions; BP remains elevated in the 180s. Per cardio, increase amlopidine to 10 mg starting tomorrow and continue hydralazine 25 mg PO q6h prn SBP > 180.

## 2018-09-12 NOTE — DISCHARGE NOTE ADULT - MEDICATION SUMMARY - MEDICATIONS TO STOP TAKING
I will STOP taking the medications listed below when I get home from the hospital:    terazosin 10 mg oral capsule  -- 1 cap(s) by mouth once a day (at bedtime)    tolterodine 4 mg oral capsule, extended release  -- 1 cap(s) by mouth once a day

## 2018-09-12 NOTE — DISCHARGE NOTE ADULT - CARE PROVIDER_API CALL
Madelaine Lemons (), Family Practice Medicine  [address] [place] [address]  Phone: [phone]  Fax: [phone]    Christ Carter), Cardiovascular Disease; Internal Medicine  [address]  [address]  [place] [address]  Phone: [phone]  Fax: [phone]

## 2018-09-12 NOTE — DIETITIAN INITIAL EVALUATION ADULT. - PROBLEM SELECTOR PLAN 8
was found to have an ulcer 6 months ago with GI  continue on sucralfate 1g tid and protonix 40mg daily

## 2018-09-12 NOTE — DISCHARGE NOTE ADULT - PLAN OF CARE
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

## 2018-09-12 NOTE — PROGRESS NOTE ADULT - SUBJECTIVE AND OBJECTIVE BOX
Chief Complaint: LE weakness, slurred speech    Interval Events: No events overnight. No complaints.    Review of Systems:  General: No fevers, chills, weight loss or gain  Skin: No rashes, color changes  Cardiovascular: No chest pain, orthopnea  Respiratory: No shortness of breath, cough  Gastrointestinal: No nausea, abdominal pain  Genitourinary: No incontinence, pain with urination  Musculoskeletal: No pain, swelling, decreased range of motion  Neurological: No headache, (+) weakness  Psychiatric: No depression, anxiety  Endocrine: No weight loss or gain, increased thirst  All other systems are comprehensively negative.    Physical Exam:  Vital Signs Last 24 Hrs  T(C): 36.6 (12 Sep 2018 08:15), Max: 36.8 (11 Sep 2018 20:53)  T(F): 97.8 (12 Sep 2018 08:15), Max: 98.3 (11 Sep 2018 20:53)  HR: 92 (12 Sep 2018 08:15) (76 - 92)  BP: 162/79 (12 Sep 2018 08:15) (162/79 - 182/78)  BP(mean): --  RR: 16 (12 Sep 2018 08:15) (16 - 17)  SpO2: 93% (12 Sep 2018 08:15) (93% - 95%)  General: NAD  HEENT: MMM  Neck: No JVD, no carotid bruit  Lungs: CTAB  CV: RRR, nl S1/S2, no M/R/G  Abdomen: S/NT/ND, +BS  Extremities: No LE edema, no cyanosis  Neuro: AAOx3, non-focal  Skin: No rash    Labs:    09-11    146<H>  |  112<H>  |  24<H>  ----------------------------<  94  3.5   |  25  |  1.20    Ca    8.7      11 Sep 2018 08:49    TPro  6.1  /  Alb  3.2<L>  /  TBili  0.5  /  DBili  x   /  AST  19  /  ALT  25  /  AlkPhos  71  09-10                        12.3   5.01  )-----------( 167      ( 11 Sep 2018 08:49 )             36.7       Telemetry: Sinus rhythm

## 2018-09-12 NOTE — DISCHARGE NOTE ADULT - HOSPITAL COURSE
The patient is an 88 year old male with history of benign prostatic hyperplasia, transient ischemic attack,, anxiety, duodenal ulcer, multiple falls presented from home with weakness and pre-syncope. He stated that he had been dizzy for quite some time, especially in AM when getting out of bed, and was put on meclizine, which did not provide him. He stated that he was ambulating with his walker and ended up seated on the floor, because he became weak. He was unable to pull himself, so his wife called EMS. He Stated that he was recently discharged from rehab approximately 5 weeks ago. He had instances of falls and slurred speech over the last few weeks. In the Emergency Department the patient received a CT head which was negative for acute intracranial pathology. Chest xray showed no acute pulmonary process and a large hiatal hernia. Xray of Right Hip/Pelvis, Right Femur, Left Hand were negative for acute dislocation or fracture. EKG showed normal sinus rhythm, normal axis and no ST abnormalities. Neurology (Dr. Floyd) was consulted. Antivert was held. Given labile blood pressure, Cardiology (Dr. Carter) was consulted. Amlodipine and hydralazine were used. Amlodipine was increased to 10 mg given continued hypertension. A Code Stroke was called later that night for slurred speech and right arm and leg weakness. The patient was seen and examined at the bedside by the rapid response team. Repeat CT scan showed no acute intracranial pathology or evidence of stroke. The next morning, the patient again had intermittent right arm and leg weakness and slurred speech that resolved. Follow up carotid duplex (9/11/18) showed mild to moderate bilateral multifocal plaque, but no hemodynamically significant stenosis. MRA (9/11/18) showed a 4 mm ICA aneurysm and M2 left stenosis. As per neurology, it was recommended that aspirin be discontinued and changed to plavix, given history of falls. Out patient MRA 3 months after discharge was recommended for assessment of aneurysm stability.    The patient was seen and evaluated each day of hospitalization. The patient is currently medically stable for discharge home. The patient is an 88 year old male with history of benign prostatic hyperplasia, transient ischemic attack, anxiety, duodenal ulcer, and multiple falls who presented from home with weakness and pre-syncope. He stated that he had been dizzy for quite some time, especially in AM when getting out of bed. He was recently seen as outpatient by a neurologist, who started him on Antivert/meclizine, which did not provide him any relief. He stated that he was ambulating with his walker and ended up seated on the floor, because he became weak. He was unable to pull himself, even with the help of his wife, so she called EMS. He was recently discharged from rehab approximately 5 weeks ago. Since then, he had instances of falls and slurred speech over the last few weeks. In the Emergency Department the patient received a CT head which was negative for acute intracranial pathology. Chest xray showed no acute pulmonary process, but a large hiatal hernia. Xray of right hip/pelvis, right femur, and the left hand were negative for acute dislocation or fracture. EKG showed normal sinus rhythm, normal axis and no ST abnormalities. Neurology (Dr. Floyd) was consulted. Antivert was held. Given labile blood pressure, Cardiology (Dr. Carter) was consulted. Amlodipine and hydralazine were started. Amlodipine was increased to 10 mg given continued hypertension. A code stroke was called later that night for slurred speech as well as right arm and leg weakness. The patient was seen and examined at the bedside by the rapid response team. Repeat CT scan showed no acute intracranial pathology or evidence of stroke. The next morning, the patient again had similar intermittent right arm and leg weakness, slurred speech, and pronator drift. The patient stated that the symptoms began after he was straining and passing a large bowel movement. The patient was seen and examined, but again symptoms resolved shortly. Follow up MRA (9/11/18) showed a 4 mm ICA aneurysm and M2 left stenosis, and carotid duplex (9/11/18) showed mild to moderate bilateral multifocal plaque, but no hemodynamically significant stenosis. Neurology recommended, that that aspirin be discontinued and changed to plavix, given history of falls, as well as followup MRA in 3 months to assess for aneurysm stability.    The patient was seen and evaluated each day of hospitalization. The patient is currently medically stable for discharge home. The patient is an 88 year old male with history of benign prostatic hyperplasia, transient ischemic attack, anxiety, duodenal ulcer, and multiple falls who presented from home with weakness and pre-syncope. He stated that he had been dizzy for quite some time, especially in AM when getting out of bed. He was recently seen as outpatient by a neurologist, who started him on Antivert/meclizine, which did not provide him any relief. He stated that he was ambulating with his walker and ended up seated on the floor, because he became weak. He was unable to pull himself, even with the help of his wife, so she called EMS. He was recently discharged from rehab approximately 5 weeks ago. Since then, he had instances of falls and slurred speech over the last few weeks. In the Emergency Department the patient received a CT head which was negative for acute intracranial pathology. Chest xray showed no acute pulmonary process, but a large hiatal hernia. Xray of right hip/pelvis, right femur, and the left hand were negative for acute dislocation or fracture. EKG showed normal sinus rhythm, normal axis and no ST abnormalities. Neurology (Dr. Floyd) was consulted. Antivert was held. Given labile blood pressure, Cardiology (Dr. Carter) was consulted. Amlodipine and hydralazine were started. Amlodipine was increased to 10 mg given continued hypertension. A code stroke was called later that night for slurred speech as well as right arm and leg weakness. The patient was seen and examined at the bedside by the rapid response team. Repeat CT scan showed no acute intracranial pathology or evidence of stroke. The next morning, the patient again had similar intermittent right arm and leg weakness, slurred speech, and pronator drift. The patient stated that the symptoms began after he was straining and passing a large bowel movement. The patient was seen and examined, but again symptoms resolved shortly. Follow up MRA (9/11/18) showed a 4 mm ICA aneurysm and M2 left stenosis, and carotid duplex (9/11/18) showed mild to moderate bilateral multifocal plaque, but no hemodynamically significant stenosis. Neurology recommended, that that aspirin be discontinued and changed to plavix, given history of falls, as well as followup MRA in 3 months to assess for aneurysm stability.    The patient was seen and evaluated each day of hospitalization. The patient is currently medically stable for discharge home.   time spent on dc greater than 45 min The patient is an 88 year old male with history of benign prostatic hyperplasia, transient ischemic attack, anxiety, duodenal ulcer, and multiple falls who presented from home with weakness and pre-syncope. He stated that he had been dizzy for quite some time, especially in AM when getting out of bed. He was recently seen as outpatient by a neurologist, who started him on Antivert/meclizine, which did not provide him any relief. He stated that he was ambulating with his walker and ended up seated on the floor, because he became weak. He was unable to pull himself, even with the help of his wife, so she called EMS. He was recently discharged from rehab approximately 5 weeks ago. Since then, he had instances of falls and slurred speech over the last few weeks. In the Emergency Department the patient received a CT head which was negative for acute intracranial pathology. Chest xray showed no acute pulmonary process, but a large hiatal hernia. Xray of right hip/pelvis, right femur, and the left hand were negative for acute dislocation or fracture. EKG showed normal sinus rhythm, normal axis and no ST abnormalities. Neurology (Dr. Floyd) was consulted. Antivert was held. Given labile blood pressure, Cardiology (Dr. Carter) was consulted. Amlodipine and hydralazine were started. Amlodipine was increased to 10 mg given continued hypertension. A code stroke was called later that night for slurred speech as well as right arm and leg weakness. The patient was seen and examined at the bedside by the rapid response team. Repeat CT scan showed no acute intracranial pathology or evidence of stroke. The next morning, the patient again had similar intermittent right arm and leg weakness, slurred speech, and pronator drift. The patient stated that the symptoms began after he was straining and passing a large bowel movement. The patient was seen and examined, but again symptoms resolved shortly. Follow up MRA (9/11/18) showed a 4 mm ICA aneurysm and M2 left stenosis, and carotid duplex (9/11/18) showed mild to moderate bilateral multifocal plaque, but no hemodynamically significant stenosis. Neurology recommended, that that aspirin be discontinued and changed to plavix, given history of falls, as well as followup MRA in 3 months to assess for aneurysm stability.    The patient was seen and evaluated each day of hospitalization. The patient is currently medically stable for discharge to rehab.   Time spent on discharge greater than 45 min.

## 2018-09-12 NOTE — PROGRESS NOTE ADULT - ASSESSMENT
87 y/o M with PMHx of BPH, TIA, anxiety, duodenal ulcer, multiple falls presents from home with weakness and pre-syncope. MRI was normal; MRA showed 4 mm ICA aneurysm and M2 left stenosis. 87 y/o M with PMHx of BPH, TIA, anxiety, duodenal ulcer, multiple falls presents from home with weakness and pre-syncope.

## 2018-09-12 NOTE — PROGRESS NOTE ADULT - ASSESSMENT
The patient is an 88 year old male with a history of BPH, TIA, anxiety, duodenal ulcer, multiple falls who presents with pre-syncope.    Plan:  - ECG with no predisposing features for syncope  - Echocardiogram 5/24/18 with normal LV systolic function, no significant valve issues  - Orthostatics negative  - Did not receive amlodipine yesterday; received 5 mg this morning. Increase amlodipine to 10 mg daily starting tomorrow  - Continue hydralazine 25 mg PO q6h prn SBP > 180  - Continue aspirin 81 mg daily and atorvastatin 10 mg daily  - MRI/MRA with no acute stroke; 4 mm ICA aneurysm and M2 stenosis noted  - Neurology follow-up  - PT

## 2018-09-12 NOTE — PROGRESS NOTE ADULT - PROBLEM SELECTOR PLAN 4
-possibly GLENN on CKD, but appears to be near baseline renal function  -s/p 1L NS bolus in the ED; will continue to encourage PO intake  -avoid nephrotoxic agents
possibly GLENN on CKD, but appears to be near baseline renal function  s/p 1L NS bolus in the ED; will continue to encourage PO intake  avoid nephrotoxic agents
-possibly GLENN on CKD, but appears to be near baseline renal function  -s/p 1L NS bolus in the ED; will continue to encourage PO intake  -avoid nephrotoxic agents

## 2018-09-12 NOTE — DISCHARGE NOTE ADULT - CARE PLAN
Principal Discharge DX:	Weakness  Goal:	Resolution  Assessment and plan of treatment:	-discontinue antivert  -increased PO intake and hydration  Secondary Diagnosis:	HTN (hypertension)  Assessment and plan of treatment:	-continue hydralazine and amlodipine  Secondary Diagnosis:	TIA (transient ischemic attack)  Assessment and plan of treatment:	-MRA (9/11/18) showed 4 mm ICA aneurysm and M2 left stenosis  -Follow up MRA in 3 months to assess aneurysm stability Principal Discharge DX:	Weakness  Goal:	Resolution  Assessment and plan of treatment:	-discontinue Antivert/Meclizine  -increased PO intake and hydration  Secondary Diagnosis:	HTN (hypertension)  Assessment and plan of treatment:	-continue hydralazine and amlodipine for blood pressure control  Secondary Diagnosis:	TIA (transient ischemic attack)  Assessment and plan of treatment:	-MRA (9/11/18) showed 4 mm ICA aneurysm and M2 left stenosis  -Follow up MRA in 3 months to assess aneurysm stability Principal Discharge DX:	Weakness  Goal:	Resolution  Assessment and plan of treatment:	-discontinue Antivert/Meclizine  -increased PO intake and hydration  off terazosin and tolteradine re possible orthostasis (although orthostatic parameters neg)  Secondary Diagnosis:	HTN (hypertension)  Assessment and plan of treatment:	-continue hydralazine and amlodipine for blood pressure control  Secondary Diagnosis:	TIA (transient ischemic attack)  Assessment and plan of treatment:	-MRA (9/11/18) showed 4 mm ICA aneurysm and M2 left stenosis  -Follow up MRA in 3 months to assess aneurysm stability Principal Discharge DX:	Weakness  Goal:	Resolution  Assessment and plan of treatment:	-discontinue Antivert/Meclizine  -increased PO intake and hydration  off terazosin and tolteradine re possible orthostasis (although orthostatic parameters neg)  Secondary Diagnosis:	HTN (hypertension)  Assessment and plan of treatment:	-continue hydralazine and amlodipine for blood pressure control  -Continue amlodipine 10 mg daily  -Continue hydralazine 25 mg PO q6h prn SBP > 180  Secondary Diagnosis:	TIA (transient ischemic attack)  Assessment and plan of treatment:	-MRA (9/11/18) showed 4 mm ICA aneurysm and M2 left stenosis  -Follow up MRA in 3 months to assess aneurysm stability

## 2018-09-12 NOTE — PROGRESS NOTE ADULT - PROBLEM SELECTOR PLAN 7
-on terazosin 10mg at home for BPH  -on tolterodine ER 4mg qhs at home for overactive bladder  -will d/c terazosin and tolterodine, which can contribute to orthostasis  -switch to flomax 04mg qhs -on terazosin 10mg at home for BPH  -on tolterodine ER 4mg qhs at home for overactive bladder  -will d/c terazosin and tolterodine, which can contribute to orthostasis  -switch to flomax 0.4 mg qhs

## 2018-09-12 NOTE — PROGRESS NOTE ADULT - PROBLEM SELECTOR PLAN 5
-history of TIA (03/2018)  -continue aspirin and statin -history of TIA (03/2018)  -Per neuro, stop aspirin and change to plavix  -continue statin

## 2018-09-12 NOTE — DISCHARGE NOTE ADULT - PATIENT PORTAL LINK FT
You can access [facility], offered by [facility], by registering with the following website: [email or web address]

## 2018-09-13 VITALS — SYSTOLIC BLOOD PRESSURE: 113 MMHG | HEART RATE: 75 BPM | DIASTOLIC BLOOD PRESSURE: 65 MMHG

## 2018-09-13 LAB
ANION GAP SERPL CALC-SCNC: 8 MMOL/L — SIGNIFICANT CHANGE UP (ref 5–17)
BUN SERPL-MCNC: 23 MG/DL — SIGNIFICANT CHANGE UP (ref 7–23)
CALCIUM SERPL-MCNC: 9 MG/DL — SIGNIFICANT CHANGE UP (ref 8.5–10.1)
CHLORIDE SERPL-SCNC: 109 MMOL/L — HIGH (ref 96–108)
CO2 SERPL-SCNC: 27 MMOL/L — SIGNIFICANT CHANGE UP (ref 22–31)
CREAT SERPL-MCNC: 1.4 MG/DL — HIGH (ref 0.5–1.3)
GLUCOSE SERPL-MCNC: 123 MG/DL — HIGH (ref 70–99)
HCT VFR BLD CALC: 40.9 % — SIGNIFICANT CHANGE UP (ref 39–50)
HGB BLD-MCNC: 13.8 G/DL — SIGNIFICANT CHANGE UP (ref 13–17)
MCHC RBC-ENTMCNC: 30.5 PG — SIGNIFICANT CHANGE UP (ref 27–34)
MCHC RBC-ENTMCNC: 33.7 GM/DL — SIGNIFICANT CHANGE UP (ref 32–36)
MCV RBC AUTO: 90.5 FL — SIGNIFICANT CHANGE UP (ref 80–100)
NRBC # BLD: 0 /100 WBCS — SIGNIFICANT CHANGE UP (ref 0–0)
PLATELET # BLD AUTO: 161 K/UL — SIGNIFICANT CHANGE UP (ref 150–400)
POTASSIUM SERPL-MCNC: 3.7 MMOL/L — SIGNIFICANT CHANGE UP (ref 3.5–5.3)
POTASSIUM SERPL-SCNC: 3.7 MMOL/L — SIGNIFICANT CHANGE UP (ref 3.5–5.3)
RBC # BLD: 4.52 M/UL — SIGNIFICANT CHANGE UP (ref 4.2–5.8)
RBC # FLD: 14.2 % — SIGNIFICANT CHANGE UP (ref 10.3–14.5)
SODIUM SERPL-SCNC: 144 MMOL/L — SIGNIFICANT CHANGE UP (ref 135–145)
WBC # BLD: 6.11 K/UL — SIGNIFICANT CHANGE UP (ref 3.8–10.5)
WBC # FLD AUTO: 6.11 K/UL — SIGNIFICANT CHANGE UP (ref 3.8–10.5)

## 2018-09-13 RX ORDER — HYDRALAZINE HCL 50 MG
1 TABLET ORAL
Qty: 0 | Refills: 0 | DISCHARGE
Start: 2018-09-13

## 2018-09-13 RX ORDER — POLYETHYLENE GLYCOL 3350 17 G/17G
17 POWDER, FOR SOLUTION ORAL ONCE
Qty: 0 | Refills: 0 | Status: COMPLETED | OUTPATIENT
Start: 2018-09-13 | End: 2018-09-13

## 2018-09-13 RX ORDER — SENNA PLUS 8.6 MG/1
2 TABLET ORAL
Qty: 0 | Refills: 0 | DISCHARGE
Start: 2018-09-13

## 2018-09-13 RX ORDER — TAMSULOSIN HYDROCHLORIDE 0.4 MG/1
1 CAPSULE ORAL
Qty: 0 | Refills: 0 | DISCHARGE
Start: 2018-09-13

## 2018-09-13 RX ORDER — TOLTERODINE TARTRATE 1 MG/1
1 TABLET, FILM COATED ORAL
Qty: 0 | Refills: 0 | COMMUNITY

## 2018-09-13 RX ORDER — MECLIZINE HCL 12.5 MG
2 TABLET ORAL
Qty: 0 | Refills: 0 | COMMUNITY

## 2018-09-13 RX ORDER — AMLODIPINE BESYLATE 2.5 MG/1
1 TABLET ORAL
Qty: 0 | Refills: 0 | DISCHARGE
Start: 2018-09-13

## 2018-09-13 RX ORDER — HEPARIN SODIUM 5000 [USP'U]/ML
5000 INJECTION INTRAVENOUS; SUBCUTANEOUS
Qty: 0 | Refills: 0 | DISCHARGE
Start: 2018-09-13

## 2018-09-13 RX ORDER — CLOPIDOGREL BISULFATE 75 MG/1
1 TABLET, FILM COATED ORAL
Qty: 0 | Refills: 0 | COMMUNITY
Start: 2018-09-13

## 2018-09-13 RX ORDER — TERAZOSIN HYDROCHLORIDE 10 MG/1
1 CAPSULE ORAL
Qty: 0 | Refills: 0 | COMMUNITY

## 2018-09-13 RX ADMIN — Medication 1 MILLIGRAM(S): at 08:15

## 2018-09-13 RX ADMIN — Medication 100 MILLIGRAM(S): at 13:52

## 2018-09-13 RX ADMIN — CLOPIDOGREL BISULFATE 75 MILLIGRAM(S): 75 TABLET, FILM COATED ORAL at 12:24

## 2018-09-13 RX ADMIN — AMLODIPINE BESYLATE 10 MILLIGRAM(S): 2.5 TABLET ORAL at 05:45

## 2018-09-13 RX ADMIN — Medication 100 MILLIGRAM(S): at 05:45

## 2018-09-13 RX ADMIN — HEPARIN SODIUM 5000 UNIT(S): 5000 INJECTION INTRAVENOUS; SUBCUTANEOUS at 05:45

## 2018-09-13 RX ADMIN — PANTOPRAZOLE SODIUM 40 MILLIGRAM(S): 20 TABLET, DELAYED RELEASE ORAL at 05:45

## 2018-09-13 RX ADMIN — BUPROPION HYDROCHLORIDE 150 MILLIGRAM(S): 150 TABLET, EXTENDED RELEASE ORAL at 12:24

## 2018-09-13 RX ADMIN — HEPARIN SODIUM 5000 UNIT(S): 5000 INJECTION INTRAVENOUS; SUBCUTANEOUS at 13:52

## 2018-09-13 RX ADMIN — POLYETHYLENE GLYCOL 3350 17 GRAM(S): 17 POWDER, FOR SOLUTION ORAL at 12:24

## 2018-09-13 RX ADMIN — Medication 1 GRAM(S): at 13:52

## 2018-09-13 RX ADMIN — Medication 1 GRAM(S): at 05:45

## 2018-09-13 RX ADMIN — Medication 1 MILLIGRAM(S): at 13:46

## 2018-09-13 NOTE — PROGRESS NOTE ADULT - SUBJECTIVE AND OBJECTIVE BOX
Chief Complaint: LE weakness, slurred speech    Interval Events: No events overnight. No complaints.    Review of Systems:  General: No fevers, chills, weight loss or gain  Skin: No rashes, color changes  Cardiovascular: No chest pain, orthopnea  Respiratory: No shortness of breath, cough  Gastrointestinal: No nausea, abdominal pain  Genitourinary: No incontinence, pain with urination  Musculoskeletal: No pain, swelling, decreased range of motion  Neurological: No headache, (+) weakness  Psychiatric: No depression, anxiety  Endocrine: No weight loss or gain, increased thirst  All other systems are comprehensively negative.    Physical Exam:  Vital Signs Last 24 Hrs  T(C): 36.2 (13 Sep 2018 04:28), Max: 36.4 (12 Sep 2018 13:22)  T(F): 97.1 (13 Sep 2018 04:28), Max: 97.6 (12 Sep 2018 13:22)  HR: 77 (13 Sep 2018 04:28) (77 - 100)  BP: 166/86 (13 Sep 2018 04:28) (151/78 - 185/74)  BP(mean): --  RR: 18 (13 Sep 2018 04:28) (16 - 18)  SpO2: 95% (13 Sep 2018 04:28) (95% - 96%)  General: NAD  HEENT: MMM  Neck: No JVD, no carotid bruit  Lungs: CTAB  CV: RRR, nl S1/S2, no M/R/G  Abdomen: S/NT/ND, +BS  Extremities: No LE edema, no cyanosis  Neuro: AAOx3, non-focal  Skin: No rash    Labs:    09-12    145  |  113<H>  |  26<H>  ----------------------------<  95  4.0   |  26  |  1.40<H>    Ca    8.8      12 Sep 2018 08:14                          12.2   5.48  )-----------( 168      ( 12 Sep 2018 08:14 )             36.6     Telemetry: sinus rhythm

## 2018-09-13 NOTE — PROGRESS NOTE ADULT - SUBJECTIVE AND OBJECTIVE BOX
Neurology follow up note    ANI [id number]      Interval History:    Patient feels ok no new complaints.    MEDICATIONS    ALPRAZolam 1 milliGRAM(s) Oral three times a day PRN  amLODIPine   Tablet 10 milliGRAM(s) Oral daily  atorvastatin 10 milliGRAM(s) Oral at bedtime  buPROPion XL . 150 milliGRAM(s) Oral daily  clopidogrel Tablet 75 milliGRAM(s) Oral daily  docusate sodium 100 milliGRAM(s) Oral three times a day  heparin  Injectable 5000 Unit(s) SubCutaneous every 8 hours  hydrALAZINE 25 milliGRAM(s) Oral every 6 hours PRN  pantoprazole    Tablet 40 milliGRAM(s) Oral before breakfast  senna 2 Tablet(s) Oral at bedtime PRN  sucralfate 1 Gram(s) Oral <User Schedule>  tamsulosin 0.4 milliGRAM(s) Oral at bedtime      Allergies    No Known Allergies    Intolerances            Vital Signs Last 24 Hrs  T(C): 36.2 (13 Sep 2018 04:28), Max: 36.4 (12 Sep 2018 13:22)  T(F): 97.1 (13 Sep 2018 04:28), Max: 97.6 (12 Sep 2018 13:22)  HR: 77 (13 Sep 2018 04:28) (77 - 100)  BP: 166/86 (13 Sep 2018 04:28) (151/78 - 185/74)  BP(mean): --  RR: 18 (13 Sep 2018 04:28) (16 - 18)  SpO2: 95% (13 Sep 2018 04:28) (95% - 96%)    REVIEW OF SYSTEMS:      Constitutional: No fever, chills, fatigue, weakness  Eyes: no eye pain, visual disturbances, or discharge  ENT:  No difficulty hearing, tinnitus, vertigo; No sinus or throat pain  Neck: No pain or stiffness  Respiratory: No cough, dyspnea, wheezing   Cardiovascular: No chest pain, palpitations,   Gastrointestinal: No abdominal or epigastric pain. No nausea, vomiting  No diarrhea or constipation.   Genitourinary: No dysuria, frequency, hematuria or incontinence  Neurological: No headaches, no  lightheadedness, no vertigo, numbness or tremors  Psychiatric: No depression, anxiety, mood swings or difficulty sleeping  Musculoskeletal: No joint pain or swelling; No muscle, back or extremity pain  Skin: No itching, burning, rashes or lesions   Lymph Nodes: No enlarged glands  Endocrine: No heat or cold intolerance; No hair loss   Allergy and Immunologic: No hives or eczema    On Neurological Examination:    The patient is awake and alert.      Extraocular movements were intact.  Pupils were equal, round, and reactive bilaterally 2 mm to 2 mm.      Positive slight forgetfulness was noted.  Speech was fluent.  Smile was symmetric.     Motor:  Bilateral upper were 4/5, bilateral lower were 4-/5, would say age-appropriate.    Follow simple commands  Follow complex commands    GENERAL Exam: Nontoxic , No Acute Distress   	  HEENT:  normocephalic, atraumatic  		  LUNGS: Clear bilaterally    	  HEART: Normal S1S2   No murmur RRR        	  GI/ ABDOMEN:  Soft  Non tender    EXTREMITIES:   No Edema  No Clubbing  No Cyanosis     MUSCULOSKELETAL: Normal Range of Motion     SKIN: Normal  No Ecchymosis               LABS:  CBC Full  -  ( 12 Sep 2018 08:14 )  WBC Count : 5.48 K/uL  Hemoglobin : 12.2 g/dL  Hematocrit : 36.6 %  Platelet Count - Automated : 168 K/uL  Mean Cell Volume : 91.5 fl  Mean Cell Hemoglobin : 30.5 pg  Mean Cell Hemoglobin Concentration : 33.3 gm/dL  Auto Neutrophil # : x  Auto Lymphocyte # : x  Auto Monocyte # : x  Auto Eosinophil # : x  Auto Basophil # : x  Auto Neutrophil % : x  Auto Lymphocyte % : x  Auto Monocyte % : x  Auto Eosinophil % : x  Auto Basophil % : x      09-12    145  |  113<H>  |  26<H>  ----------------------------<  95  4.0   |  26  |  1.40<H>    Ca    8.8      12 Sep 2018 08:14      Hemoglobin A1C:       Vitamin B12         RADIOLOGY    ANALYSIS AND PLAN:  This is an 88-year-old with an episode of lightheadedness, ataxia, dementia, and fall.  1.	For lightheadedness, suspect this could be possibly secondary to cerebral hypoperfusion, possibly secondary to a history of poor oral intake, subtle signs of dehydration, questionable this could be medication induced from chronic use of Antivert. Now events noted with fluctuations  of BP,  mri head normal questionable HTN urgency type of picture  2.	I would recommend telemetry evaluation.  3.	Monitor systolic blood pressure.  4.	For history of mild cognitive impairment versus subtle dementia, for now, I would recommend supportive therapy.  5.	I would recommend fall precautions.  6.	Questionable this could be TIA-like symptoms changes in bp. 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 spoke to spouse she agrees with plan   7.	MRA follow in 3 months aneurysm   8.	For history of depression, anxiety, continue the patient on Buspirone.  9.	For history of high cholesterol, continue the patient on a statin.  10.	Spoke with the spouse, Beverly, at  [phone] 9/13/18  11.	spoke to DR BEAL updated her     Physical therapy evaluation as tolerated  OOB to chair/ambulation with assistance only if possible.    Greater than 40 minutes spent in direct patient care reviewing  the notes, lab data/ imaging , discussion with multidisciplinary team.

## 2018-09-13 NOTE — PROGRESS NOTE ADULT - ASSESSMENT
The patient is an 88 year old male with a history of BPH, TIA, anxiety, duodenal ulcer, multiple falls who presents with pre-syncope.    Plan:  - Echocardiogram 5/24/18 with normal LV systolic function, no significant valve issues  - Orthostatics negative on multiple checks  - Continue amlodipine 10 mg daily  - Continue hydralazine 25 mg PO q6h prn SBP > 180  - Continue aspirin 81 mg daily and atorvastatin 10 mg daily  - MRI/MRA with no acute stroke; 4 mm ICA aneurysm and M2 stenosis noted  - Neurology follow-up  - Symptoms on standing more due to weakness than dizziness  - PT/rehab

## 2018-09-15 LAB
CULTURE RESULTS: SIGNIFICANT CHANGE UP
CULTURE RESULTS: SIGNIFICANT CHANGE UP
SPECIMEN SOURCE: SIGNIFICANT CHANGE UP
SPECIMEN SOURCE: SIGNIFICANT CHANGE UP

## 2018-10-23 ENCOUNTER — APPOINTMENT (OUTPATIENT)
Dept: CARDIOLOGY | Facility: CLINIC | Age: 83
End: 2018-10-23

## 2018-10-24 ENCOUNTER — NON-APPOINTMENT (OUTPATIENT)
Age: 83
End: 2018-10-24

## 2018-10-24 ENCOUNTER — APPOINTMENT (OUTPATIENT)
Dept: CARDIOLOGY | Facility: CLINIC | Age: 83
End: 2018-10-24
Payer: MEDICARE

## 2018-10-24 VITALS
HEIGHT: 70 IN | DIASTOLIC BLOOD PRESSURE: 80 MMHG | BODY MASS INDEX: 23.62 KG/M2 | SYSTOLIC BLOOD PRESSURE: 148 MMHG | HEART RATE: 98 BPM | OXYGEN SATURATION: 98 % | WEIGHT: 165 LBS

## 2018-10-24 DIAGNOSIS — R00.2 PALPITATIONS: ICD-10-CM

## 2018-10-24 PROCEDURE — 83605 ASSAY OF LACTIC ACID: CPT

## 2018-10-24 PROCEDURE — 70544 MR ANGIOGRAPHY HEAD W/O DYE: CPT

## 2018-10-24 PROCEDURE — 97162 PT EVAL MOD COMPLEX 30 MIN: CPT

## 2018-10-24 PROCEDURE — 99285 EMERGENCY DEPT VISIT HI MDM: CPT | Mod: 25

## 2018-10-24 PROCEDURE — 70450 CT HEAD/BRAIN W/O DYE: CPT

## 2018-10-24 PROCEDURE — 71045 X-RAY EXAM CHEST 1 VIEW: CPT

## 2018-10-24 PROCEDURE — 80048 BASIC METABOLIC PNL TOTAL CA: CPT

## 2018-10-24 PROCEDURE — 87040 BLOOD CULTURE FOR BACTERIA: CPT

## 2018-10-24 PROCEDURE — 80053 COMPREHEN METABOLIC PANEL: CPT

## 2018-10-24 PROCEDURE — 99205 OFFICE O/P NEW HI 60 MIN: CPT

## 2018-10-24 PROCEDURE — 85730 THROMBOPLASTIN TIME PARTIAL: CPT

## 2018-10-24 PROCEDURE — 81001 URINALYSIS AUTO W/SCOPE: CPT

## 2018-10-24 PROCEDURE — 82962 GLUCOSE BLOOD TEST: CPT

## 2018-10-24 PROCEDURE — 93880 EXTRACRANIAL BILAT STUDY: CPT

## 2018-10-24 PROCEDURE — 87086 URINE CULTURE/COLONY COUNT: CPT

## 2018-10-24 PROCEDURE — 85610 PROTHROMBIN TIME: CPT

## 2018-10-24 PROCEDURE — 93000 ELECTROCARDIOGRAM COMPLETE: CPT

## 2018-10-24 PROCEDURE — 85027 COMPLETE CBC AUTOMATED: CPT

## 2018-10-24 PROCEDURE — 70551 MRI BRAIN STEM W/O DYE: CPT

## 2018-10-24 PROCEDURE — 93005 ELECTROCARDIOGRAM TRACING: CPT

## 2018-10-24 RX ORDER — CLOPIDOGREL BISULFATE 75 MG/1
75 TABLET, FILM COATED ORAL
Qty: 14 | Refills: 3 | Status: ACTIVE | COMMUNITY
Start: 2018-10-24

## 2018-11-02 ENCOUNTER — APPOINTMENT (OUTPATIENT)
Dept: CARDIOLOGY | Facility: CLINIC | Age: 83
End: 2018-11-02
Payer: MEDICARE

## 2018-11-02 PROCEDURE — 93228 REMOTE 30 DAY ECG REV/REPORT: CPT

## 2018-11-14 ENCOUNTER — APPOINTMENT (OUTPATIENT)
Dept: CARDIOLOGY | Facility: CLINIC | Age: 83
End: 2018-11-14
Payer: MEDICARE

## 2018-11-14 PROCEDURE — 93306 TTE W/DOPPLER COMPLETE: CPT

## 2018-12-21 ENCOUNTER — NON-APPOINTMENT (OUTPATIENT)
Age: 83
End: 2018-12-21

## 2018-12-21 ENCOUNTER — APPOINTMENT (OUTPATIENT)
Dept: CARDIOLOGY | Facility: CLINIC | Age: 83
End: 2018-12-21
Payer: MEDICARE

## 2018-12-21 VITALS
WEIGHT: 176 LBS | HEART RATE: 108 BPM | BODY MASS INDEX: 25.2 KG/M2 | SYSTOLIC BLOOD PRESSURE: 137 MMHG | HEIGHT: 70 IN | DIASTOLIC BLOOD PRESSURE: 83 MMHG | OXYGEN SATURATION: 98 %

## 2018-12-21 DIAGNOSIS — I35.0 NONRHEUMATIC AORTIC (VALVE) STENOSIS: ICD-10-CM

## 2018-12-21 PROCEDURE — 99214 OFFICE O/P EST MOD 30 MIN: CPT

## 2018-12-21 PROCEDURE — 93000 ELECTROCARDIOGRAM COMPLETE: CPT

## 2019-09-17 NOTE — ED PROVIDER NOTE - RECENT EXPOSURE TO
PRINCIPAL DISCHARGE DIAGNOSIS  Diagnosis: Anemia  Assessment and Plan of Treatment: -   - Improved with Transfusions   - Vitamin B12 Deficiency - Will replete with IM supplements   - Pt to f/u OP none known

## 2020-10-27 NOTE — ED ADULT TRIAGE NOTE - DIRECT TO ROOM CARE INITIATED:
Leopoldo Highman / Vannesa Urbina / Deana Kilgore / Kimberlyn Live / Natalie Kraus / [facility] [phone]  Answering Service [phone]      Maicol Coulterdmont Patient Status:  Inpatient    1980 MRN [id number]   Location [facility] weight 236 lb (107 kg), SpO2 96 %.   General: no acute distress, appears reasonably comfortable  HENT: normocephalic, atraumatic  Lung: breathing comfortably, symmetrical expansion, no wheezing  Heart: regular rate and rhythm  Abdomen: soft, appropriate inc 08-Mar-2018 07:02

## 2021-03-05 NOTE — H&P ADULT - HISTORY OF PRESENT ILLNESS
89 y/o M with PMHx of BPH, TIA, anxiety, duodenal ulcer, multiple falls presents from home with weakness and pre-syncope. States that he has been dizzy for quite some time and was put on meclizine, which has not been giving him relief. Saw a female neurologist on Friday, as he "thought he had a brain tumor." Neurology told the patient that meclizine may have been worsening his symptoms. States that his dizziness is worse in the morning upon getting up from bed. Lives with his wife. States that he was ambulating with his walker and ended up seated on the floor. Was unable to pull himself up and his wife was unable to get him up so she called EMS. States that he was recently in rehab about 5 weeks ago. States that he had been walking 2-3 blocks as recently as last week but had since not done so the last few days. States that he still took his meclizine the last two days. Had some slurring of his speech, which has resolved. He has had similar presentations with falls over the past few weeks with weakness in his legs and slurring of speech. Currently still admitting to dizziness. Of note, has long standing anxiety for which he takes xanax 2-3 times a day, standing. Was taking it TID for a long time and recently tried dropping down to BID, with no withdrawal symptoms. States that he had withdrawal from stopping xanax for 5 days at one time when he ran out of his prescription, this event resolved after taking xanax. Denies CP, palpitations, dyspnea, FINE, PND, abdominal pain, n/v/d, dysuria or hematuria.    In the ED, VSS. Labs significant for H/H 12.2/36.2, BUN/Cr of 32/1.50. UA negative. CT head negative. CXR no acute pulmonary process, large hiatal hernia. XR Right Hip/Pelvis, Right Femur, Left Hand were negative for acute dislocation or fracture. EKG: NSR, normal axis, no ST abnormality.
6

## 2021-06-04 NOTE — PROGRESS NOTE ADULT - PROBLEM SELECTOR PLAN 9
-DVT prophylaxis: heparin subQ  -fall precautions
.
-DVT prophylaxis: heparin subQ  -fall precautions
DVT prophylaxis: heparin subQ  fall precautions

## 2021-08-10 NOTE — ED PROVIDER NOTE - NS ED MD DISPO ISOLATION TYPES
[Urethral Meatus] : meatus normal [Penis Abnormality] : normal circumcised penis [Urinary Bladder Findings] : the bladder was normal on palpation [Scrotum] : the scrotum was normal [FreeTextEntry1] : grade I left varicocele (clear).  None

## 2021-11-12 NOTE — PROVIDER CONTACT NOTE (OTHER) - ASSESSMENT
patient not symptomatic. Patient states it is because he is in the hospital
Not applicable (known HIV negative status in last year)

## 2021-11-21 ENCOUNTER — INPATIENT (INPATIENT)
Facility: HOSPITAL | Age: 86
LOS: 0 days | Discharge: HOME | DRG: 866 | End: 2021-11-22
Attending: HOSPITALIST | Admitting: HOSPITALIST
Payer: COMMERCIAL

## 2021-11-21 VITALS
WEIGHT: 177.03 LBS | SYSTOLIC BLOOD PRESSURE: 112 MMHG | OXYGEN SATURATION: 96 % | RESPIRATION RATE: 18 BRPM | TEMPERATURE: 98 F | HEIGHT: 70 IN | HEART RATE: 70 BPM | DIASTOLIC BLOOD PRESSURE: 72 MMHG

## 2021-11-21 DIAGNOSIS — B97.4 RESPIRATORY SYNCYTIAL VIRUS AS THE CAUSE OF DISEASES CLASSIFIED ELSEWHERE: ICD-10-CM

## 2021-11-21 DIAGNOSIS — K46.9 UNSPECIFIED ABDOMINAL HERNIA WITHOUT OBSTRUCTION OR GANGRENE: Chronic | ICD-10-CM

## 2021-11-21 LAB
ALBUMIN SERPL ELPH-MCNC: 2.8 G/DL — LOW (ref 3.3–5)
ALP SERPL-CCNC: 83 U/L — SIGNIFICANT CHANGE UP (ref 40–120)
ALT FLD-CCNC: 20 U/L — SIGNIFICANT CHANGE UP (ref 12–78)
ANION GAP SERPL CALC-SCNC: 4 MMOL/L — LOW (ref 5–17)
APPEARANCE UR: CLEAR — SIGNIFICANT CHANGE UP
AST SERPL-CCNC: 17 U/L — SIGNIFICANT CHANGE UP (ref 15–37)
BACTERIA # UR AUTO: ABNORMAL
BASOPHILS # BLD AUTO: 0.04 K/UL — SIGNIFICANT CHANGE UP (ref 0–0.2)
BASOPHILS NFR BLD AUTO: 0.5 % — SIGNIFICANT CHANGE UP (ref 0–2)
BILIRUB SERPL-MCNC: 0.8 MG/DL — SIGNIFICANT CHANGE UP (ref 0.2–1.2)
BILIRUB UR-MCNC: NEGATIVE — SIGNIFICANT CHANGE UP
BUN SERPL-MCNC: 23 MG/DL — SIGNIFICANT CHANGE UP (ref 7–23)
CALCIUM SERPL-MCNC: 8.6 MG/DL — SIGNIFICANT CHANGE UP (ref 8.5–10.1)
CHLORIDE SERPL-SCNC: 110 MMOL/L — HIGH (ref 96–108)
CO2 SERPL-SCNC: 29 MMOL/L — SIGNIFICANT CHANGE UP (ref 22–31)
COLOR SPEC: YELLOW — SIGNIFICANT CHANGE UP
COMMENT - URINE: SIGNIFICANT CHANGE UP
COMMENT - URINE: SIGNIFICANT CHANGE UP
CREAT SERPL-MCNC: 1.5 MG/DL — HIGH (ref 0.5–1.3)
DIFF PNL FLD: NEGATIVE — SIGNIFICANT CHANGE UP
EOSINOPHIL # BLD AUTO: 0.16 K/UL — SIGNIFICANT CHANGE UP (ref 0–0.5)
EOSINOPHIL NFR BLD AUTO: 1.9 % — SIGNIFICANT CHANGE UP (ref 0–6)
EPI CELLS # UR: SIGNIFICANT CHANGE UP
GLUCOSE SERPL-MCNC: 114 MG/DL — HIGH (ref 70–99)
GLUCOSE UR QL: NEGATIVE — SIGNIFICANT CHANGE UP
HCT VFR BLD CALC: 37 % — LOW (ref 39–50)
HGB BLD-MCNC: 12 G/DL — LOW (ref 13–17)
IMM GRANULOCYTES NFR BLD AUTO: 0.4 % — SIGNIFICANT CHANGE UP (ref 0–1.5)
KETONES UR-MCNC: NEGATIVE — SIGNIFICANT CHANGE UP
LEUKOCYTE ESTERASE UR-ACNC: NEGATIVE — SIGNIFICANT CHANGE UP
LYMPHOCYTES # BLD AUTO: 0.74 K/UL — LOW (ref 1–3.3)
LYMPHOCYTES # BLD AUTO: 8.8 % — LOW (ref 13–44)
MCHC RBC-ENTMCNC: 29.8 PG — SIGNIFICANT CHANGE UP (ref 27–34)
MCHC RBC-ENTMCNC: 32.4 GM/DL — SIGNIFICANT CHANGE UP (ref 32–36)
MCV RBC AUTO: 91.8 FL — SIGNIFICANT CHANGE UP (ref 80–100)
MONOCYTES # BLD AUTO: 0.93 K/UL — HIGH (ref 0–0.9)
MONOCYTES NFR BLD AUTO: 11 % — SIGNIFICANT CHANGE UP (ref 2–14)
NEUTROPHILS # BLD AUTO: 6.54 K/UL — SIGNIFICANT CHANGE UP (ref 1.8–7.4)
NEUTROPHILS NFR BLD AUTO: 77.4 % — HIGH (ref 43–77)
NITRITE UR-MCNC: NEGATIVE — SIGNIFICANT CHANGE UP
NRBC # BLD: 0 /100 WBCS — SIGNIFICANT CHANGE UP (ref 0–0)
PH UR: 6.5 — SIGNIFICANT CHANGE UP (ref 5–8)
PLATELET # BLD AUTO: 173 K/UL — SIGNIFICANT CHANGE UP (ref 150–400)
POTASSIUM SERPL-MCNC: 3.9 MMOL/L — SIGNIFICANT CHANGE UP (ref 3.5–5.3)
POTASSIUM SERPL-SCNC: 3.9 MMOL/L — SIGNIFICANT CHANGE UP (ref 3.5–5.3)
PROT SERPL-MCNC: 6.3 G/DL — SIGNIFICANT CHANGE UP (ref 6–8.3)
PROT UR-MCNC: 15
RAPID RVP RESULT: DETECTED
RBC # BLD: 4.03 M/UL — LOW (ref 4.2–5.8)
RBC # FLD: 14 % — SIGNIFICANT CHANGE UP (ref 10.3–14.5)
RBC CASTS # UR COMP ASSIST: SIGNIFICANT CHANGE UP /HPF (ref 0–4)
RSV RNA SPEC QL NAA+PROBE: DETECTED
SARS-COV-2 RNA SPEC QL NAA+PROBE: SIGNIFICANT CHANGE UP
SODIUM SERPL-SCNC: 143 MMOL/L — SIGNIFICANT CHANGE UP (ref 135–145)
SP GR SPEC: 1.01 — SIGNIFICANT CHANGE UP (ref 1.01–1.02)
UROBILINOGEN FLD QL: NEGATIVE — SIGNIFICANT CHANGE UP
WBC # BLD: 8.44 K/UL — SIGNIFICANT CHANGE UP (ref 3.8–10.5)
WBC # FLD AUTO: 8.44 K/UL — SIGNIFICANT CHANGE UP (ref 3.8–10.5)
WBC UR QL: SIGNIFICANT CHANGE UP

## 2021-11-21 PROCEDURE — 99223 1ST HOSP IP/OBS HIGH 75: CPT | Mod: AI

## 2021-11-21 PROCEDURE — 71250 CT THORAX DX C-: CPT | Mod: 26,MA

## 2021-11-21 PROCEDURE — 99285 EMERGENCY DEPT VISIT HI MDM: CPT

## 2021-11-21 PROCEDURE — 71046 X-RAY EXAM CHEST 2 VIEWS: CPT | Mod: 26

## 2021-11-21 RX ORDER — ROBINUL 0.2 MG/ML
1 INJECTION INTRAMUSCULAR; INTRAVENOUS
Qty: 0 | Refills: 0 | DISCHARGE

## 2021-11-21 RX ORDER — ALPRAZOLAM 0.25 MG
1 TABLET ORAL
Qty: 0 | Refills: 0 | DISCHARGE

## 2021-11-21 RX ORDER — METOPROLOL TARTRATE 50 MG
1 TABLET ORAL
Qty: 0 | Refills: 0 | DISCHARGE

## 2021-11-21 RX ORDER — DOXAZOSIN MESYLATE 4 MG
8 TABLET ORAL AT BEDTIME
Refills: 0 | Status: DISCONTINUED | OUTPATIENT
Start: 2021-11-21 | End: 2021-11-22

## 2021-11-21 RX ORDER — ONDANSETRON 8 MG/1
4 TABLET, FILM COATED ORAL EVERY 8 HOURS
Refills: 0 | Status: DISCONTINUED | OUTPATIENT
Start: 2021-11-21 | End: 2021-11-22

## 2021-11-21 RX ORDER — SODIUM CHLORIDE 9 MG/ML
1000 INJECTION INTRAMUSCULAR; INTRAVENOUS; SUBCUTANEOUS ONCE
Refills: 0 | Status: COMPLETED | OUTPATIENT
Start: 2021-11-21 | End: 2021-11-21

## 2021-11-21 RX ORDER — APIXABAN 2.5 MG/1
2.5 TABLET, FILM COATED ORAL EVERY 12 HOURS
Refills: 0 | Status: DISCONTINUED | OUTPATIENT
Start: 2021-11-21 | End: 2021-11-22

## 2021-11-21 RX ORDER — METOPROLOL TARTRATE 50 MG
25 TABLET ORAL
Refills: 0 | Status: DISCONTINUED | OUTPATIENT
Start: 2021-11-21 | End: 2021-11-22

## 2021-11-21 RX ORDER — CLOPIDOGREL BISULFATE 75 MG/1
75 TABLET, FILM COATED ORAL DAILY
Refills: 0 | Status: DISCONTINUED | OUTPATIENT
Start: 2021-11-21 | End: 2021-11-22

## 2021-11-21 RX ORDER — PANTOPRAZOLE SODIUM 20 MG/1
40 TABLET, DELAYED RELEASE ORAL
Refills: 0 | Status: DISCONTINUED | OUTPATIENT
Start: 2021-11-21 | End: 2021-11-22

## 2021-11-21 RX ORDER — SODIUM CHLORIDE 9 MG/ML
1000 INJECTION INTRAMUSCULAR; INTRAVENOUS; SUBCUTANEOUS
Refills: 0 | Status: DISCONTINUED | OUTPATIENT
Start: 2021-11-21 | End: 2021-11-22

## 2021-11-21 RX ORDER — APIXABAN 2.5 MG/1
1 TABLET, FILM COATED ORAL
Qty: 0 | Refills: 0 | DISCHARGE

## 2021-11-21 RX ORDER — ACETAMINOPHEN 500 MG
650 TABLET ORAL EVERY 6 HOURS
Refills: 0 | Status: DISCONTINUED | OUTPATIENT
Start: 2021-11-21 | End: 2021-11-22

## 2021-11-21 RX ORDER — ALPRAZOLAM 0.25 MG
0.25 TABLET ORAL
Refills: 0 | Status: DISCONTINUED | OUTPATIENT
Start: 2021-11-21 | End: 2021-11-22

## 2021-11-21 RX ORDER — AMLODIPINE BESYLATE 2.5 MG/1
5 TABLET ORAL DAILY
Refills: 0 | Status: DISCONTINUED | OUTPATIENT
Start: 2021-11-21 | End: 2021-11-22

## 2021-11-21 RX ORDER — ATORVASTATIN CALCIUM 80 MG/1
10 TABLET, FILM COATED ORAL AT BEDTIME
Refills: 0 | Status: DISCONTINUED | OUTPATIENT
Start: 2021-11-21 | End: 2021-11-22

## 2021-11-21 RX ORDER — AMLODIPINE BESYLATE 2.5 MG/1
1 TABLET ORAL
Qty: 0 | Refills: 0 | DISCHARGE

## 2021-11-21 RX ORDER — ROBINUL 0.2 MG/ML
1 INJECTION INTRAMUSCULAR; INTRAVENOUS
Refills: 0 | Status: DISCONTINUED | OUTPATIENT
Start: 2021-11-21 | End: 2021-11-22

## 2021-11-21 RX ORDER — BUPROPION HYDROCHLORIDE 150 MG/1
150 TABLET, EXTENDED RELEASE ORAL DAILY
Refills: 0 | Status: DISCONTINUED | OUTPATIENT
Start: 2021-11-21 | End: 2021-11-22

## 2021-11-21 RX ORDER — FAMOTIDINE 10 MG/ML
20 INJECTION INTRAVENOUS ONCE
Refills: 0 | Status: COMPLETED | OUTPATIENT
Start: 2021-11-21 | End: 2021-11-21

## 2021-11-21 RX ORDER — TERAZOSIN HYDROCHLORIDE 10 MG/1
1 CAPSULE ORAL
Qty: 0 | Refills: 0 | DISCHARGE

## 2021-11-21 RX ADMIN — Medication 0.25 MILLIGRAM(S): at 17:32

## 2021-11-21 RX ADMIN — Medication 8 MILLIGRAM(S): at 22:42

## 2021-11-21 RX ADMIN — ATORVASTATIN CALCIUM 10 MILLIGRAM(S): 80 TABLET, FILM COATED ORAL at 22:42

## 2021-11-21 RX ADMIN — Medication 30 MILLILITER(S): at 11:51

## 2021-11-21 RX ADMIN — FAMOTIDINE 20 MILLIGRAM(S): 10 INJECTION INTRAVENOUS at 11:51

## 2021-11-21 RX ADMIN — Medication 200 MILLIGRAM(S): at 22:42

## 2021-11-21 RX ADMIN — ROBINUL 1 MILLIGRAM(S): 0.2 INJECTION INTRAMUSCULAR; INTRAVENOUS at 17:32

## 2021-11-21 RX ADMIN — Medication 25 MILLIGRAM(S): at 17:32

## 2021-11-21 RX ADMIN — SODIUM CHLORIDE 1000 MILLILITER(S): 9 INJECTION INTRAMUSCULAR; INTRAVENOUS; SUBCUTANEOUS at 11:34

## 2021-11-21 RX ADMIN — APIXABAN 2.5 MILLIGRAM(S): 2.5 TABLET, FILM COATED ORAL at 17:32

## 2021-11-21 RX ADMIN — SODIUM CHLORIDE 2000 MILLILITER(S): 9 INJECTION INTRAMUSCULAR; INTRAVENOUS; SUBCUTANEOUS at 10:30

## 2021-11-21 RX ADMIN — SODIUM CHLORIDE 60 MILLILITER(S): 9 INJECTION INTRAMUSCULAR; INTRAVENOUS; SUBCUTANEOUS at 15:39

## 2021-11-21 NOTE — ED PROVIDER NOTE - NSICDXPASTMEDICALHX_GEN_ALL_CORE_FT
Possible Home
PAST MEDICAL HISTORY:  Anxiety     BPH (benign prostatic hyperplasia)     Diverticulitis     Duodenal ulcer     TIA (transient ischemic attack)

## 2021-11-21 NOTE — H&P ADULT - NSICDXPASTMEDICALHX_GEN_ALL_CORE_FT
PAST MEDICAL HISTORY:  Anxiety     BPH (benign prostatic hyperplasia)     Diverticulitis     Duodenal ulcer     TIA (transient ischemic attack)

## 2021-11-21 NOTE — ED ADULT NURSE NOTE - NSIMPLEMENTINTERV_GEN_ALL_ED
Implemented All Fall with Harm Risk Interventions:  Anselmo to call system. Call bell, personal items and telephone within reach. Instruct patient to call for assistance. Room bathroom lighting operational. Non-slip footwear when patient is off stretcher. Physically safe environment: no spills, clutter or unnecessary equipment. Stretcher in lowest position, wheels locked, appropriate side rails in place. Provide visual cue, wrist band, yellow gown, etc. Monitor gait and stability. Monitor for mental status changes and reorient to person, place, and time. Review medications for side effects contributing to fall risk. Reinforce activity limits and safety measures with patient and family. Provide visual clues: red socks.

## 2021-11-21 NOTE — ED ADULT NURSE NOTE - OBJECTIVE STATEMENT
Patient's wife says the patient has been sick for three weeks with cough and congestion and she feels like he is getting worse. Patient has no complaints of chest pain, O2 saturation is 97% on room air, cap refill is <2 sec. Patient's lungs sound clear bilaterally

## 2021-11-21 NOTE — H&P ADULT - ASSESSMENT
89y/o M. with hx of HTN, HLD, Afib on Eliquis, Depression, Anxiety, and BPH presenting with cough and generalized weakness.  Found to have RSV infection and GLENN.    -Respiratory Syncytial Virus infection:  s/p 1L NS bolus in ED.  Supportive care.  Guaifenesin PRN.  NS@60cc/hr x 8 hours.  Tylenol PRN.  -Generalized weakness:  likely to above infection and dehydration.  Physical therapy evaluation.   -GLENN:  continue NS@60cc/hr x 8 hours.  Check kidney and bladder US  -HTN:  -Afib:  -HLD:  -Depression and anxiety:  -BPH:  -VTE ppx:   89y/o M. with hx of HTN, HLD, Afib on Eliquis, Depression, Anxiety, TIA, and BPH presenting with cough and generalized weakness.  Found to have RSV infection and GLENN.    -Respiratory Syncytial Virus infection:  s/p 1L NS bolus in ED.  Supportive care.  Guaifenesin PRN.  NS@60cc/hr x 8 hours.  Tylenol PRN.  -Generalized weakness:  likely 2/2 above infection and dehydration.  Physical therapy evaluation.   -GLENN:  Hold HCTZ.  Continue NS@60cc/hr x 8 hours.  Check kidney and bladder US  -HTN:  Holding HCTZ 2/2 GLENN.  Continue Metoprolol 25mg PO BID.  -Afib:  Cointinue Metoprolol 25mg PO BID and Eliquis.  -TIA:  continue Plavix and statin.   -HLD:  continue Atorvastatin 10mg PO QHS  -Depression and anxiety:  Continue Alprazolam 0.25mg PO BID and Buproprion 150mg PO daily  -BPH: continue Terazosin 10mg PO QHS  -VTE ppx:  On Eliquis    IMPROVE VTE Individual Risk Assessment    RISK                                                                Points    [  ] Previous VTE                                                  3    [  ] Thrombophilia                                               2    [  ] Lower limb paralysis                                      2        (unable to hold up >15 seconds)      [  ] Current Cancer                                              2         (within 6 months)    [  ] Immobilization > 24 hrs                                1    [  ] ICU/CCU stay > 24 hours                              1    [ x ] Age > 60                                                      1    IMPROVE VTE Score _____1____    IMPROVE Score 0-1: Low Risk, No VTE prophylaxis required for most patients, encourage ambulation.   IMPROVE Score 2-3: At risk, pharmacologic VTE prophylaxis is indicated for most patients (in the absence of a contraindication)  IMPROVE Score > or = 4: High Risk, pharmacologic VTE prophylaxis is indicated for most patients (in the absence of a contraindication)

## 2021-11-21 NOTE — ED PROVIDER NOTE - OBJECTIVE STATEMENT
89 m history of hypertension, hyperlipidemia, afib on eliquis, depression, hard of hearing here with wife complaining of cough and congestion with green sputum, ongoing and worsening for the past 2 weeks. patient was hospitalized 4 weeks ago for weakness, went to rehab and was discharged 1.5 weeks ago. cough started in rehab. wife reports negative XR and covid swab. No associated fevers, chills, shortness of breath, chest pain. patient has chronic abdominal pain, had MRI of abdomen which was negative per wife. patient had episode of diarrhea 5 days ago which resolved.    PMD is Dr. Matute.

## 2021-11-21 NOTE — H&P ADULT - HISTORY OF PRESENT ILLNESS
89y/o M. with hx of HTN, HLD, Afib on Eliquis, Depression/anxiety, and BPH presenting from home with cough and generalized weakness.  Wife he developed a cough while in rehab and was discharged from rehab 1 1/2 weeks ago.  Since then the cough has gotten worse.  Pt. reports productive of greenish phlegm.  Feeling weak with decreased appetite.  He denies fever, SOB, CP, N/V, abdominal pain, or dysuria.  Wife states he had diarrhea 3 days ago and none since.  Rest of ROS negative.  In ED +RSV.  CT chest clear.  BUN/Cr 23/1.50. 89y/o M. with hx of HTN, HLD, Afib on Eliquis, Depression/anxiety, TIA, and BPH presenting from home with cough and generalized weakness.  Wife he developed a cough while in rehab and was discharged from rehab 1 1/2 weeks ago.  Since then the cough has gotten worse.  Pt. reports productive of greenish phlegm.  Feeling weak with decreased appetite.  He denies fever, SOB, CP, N/V, abdominal pain, or dysuria.  Wife states he had diarrhea 3 days ago and none since.  Rest of ROS negative.  In ED +RSV.  CT chest clear.  BUN/Cr 23/1.50.

## 2021-11-21 NOTE — ED PROVIDER NOTE - NS ED MD DISPO DIVISION
Notified to schedule follow up if medication does not help she verbalized understanding no further questions.    [facility]

## 2021-11-21 NOTE — ED PROVIDER NOTE - NS ED ROS FT
Constitutional: No fever.  Eyes: No vision changes.   Ears, Nose, Mouth, Throat: No congestion.  Cardiovascular: No chest pain.  Respiratory: No difficulty breathing. + cough, congestion.  Gastrointestinal: No nausea. No vomiting.  Genitourinary: No dysuria.  Musculoskeletal: No joint pain.  Neurological: No headache.  Integumentary (skin and/or breast): No rash.

## 2021-11-21 NOTE — ED PROVIDER NOTE - PHYSICAL EXAMINATION
Vital signs as available reviewed.  General:  Comfortable, no acute distress.  Head:  Normocephalic, atraumatic.  Eyes:  Conjunctiva pink, no icterus.  Cardiovascular:  Regular rate, no obvious murmur.  Respiratory:  left upper rhonchi.  Abdomen:  + left sided abdominal tenderness to palpation (chronic per wife)  Musculoskeletal:  No deformity or calf tenderness.  Neurologic: Alert and oriented, moving all extremities.  Skin:  Warm and dry.

## 2021-11-22 ENCOUNTER — TRANSCRIPTION ENCOUNTER (OUTPATIENT)
Age: 86
End: 2021-11-22

## 2021-11-22 VITALS
DIASTOLIC BLOOD PRESSURE: 54 MMHG | HEART RATE: 61 BPM | SYSTOLIC BLOOD PRESSURE: 137 MMHG | RESPIRATION RATE: 18 BRPM | TEMPERATURE: 98 F | OXYGEN SATURATION: 94 %

## 2021-11-22 LAB
ANION GAP SERPL CALC-SCNC: 5 MMOL/L — SIGNIFICANT CHANGE UP (ref 5–17)
BASOPHILS # BLD AUTO: 0.04 K/UL — SIGNIFICANT CHANGE UP (ref 0–0.2)
BASOPHILS NFR BLD AUTO: 0.6 % — SIGNIFICANT CHANGE UP (ref 0–2)
BUN SERPL-MCNC: 17 MG/DL — SIGNIFICANT CHANGE UP (ref 7–23)
CALCIUM SERPL-MCNC: 8.5 MG/DL — SIGNIFICANT CHANGE UP (ref 8.5–10.1)
CHLORIDE SERPL-SCNC: 112 MMOL/L — HIGH (ref 96–108)
CO2 SERPL-SCNC: 28 MMOL/L — SIGNIFICANT CHANGE UP (ref 22–31)
COVID-19 NUCLEOCAPSID GAM AB INTERP: NEGATIVE — SIGNIFICANT CHANGE UP
COVID-19 NUCLEOCAPSID TOTAL GAM ANTIBODY RESULT: 0.07 INDEX — SIGNIFICANT CHANGE UP
COVID-19 SPIKE DOMAIN AB INTERP: POSITIVE
COVID-19 SPIKE DOMAIN ANTIBODY RESULT: >250 U/ML — HIGH
CREAT SERPL-MCNC: 1.3 MG/DL — SIGNIFICANT CHANGE UP (ref 0.5–1.3)
EOSINOPHIL # BLD AUTO: 0.17 K/UL — SIGNIFICANT CHANGE UP (ref 0–0.5)
EOSINOPHIL NFR BLD AUTO: 2.7 % — SIGNIFICANT CHANGE UP (ref 0–6)
GLUCOSE SERPL-MCNC: 89 MG/DL — SIGNIFICANT CHANGE UP (ref 70–99)
HCT VFR BLD CALC: 36.2 % — LOW (ref 39–50)
HGB BLD-MCNC: 11.9 G/DL — LOW (ref 13–17)
IMM GRANULOCYTES NFR BLD AUTO: 0.6 % — SIGNIFICANT CHANGE UP (ref 0–1.5)
LYMPHOCYTES # BLD AUTO: 0.79 K/UL — LOW (ref 1–3.3)
LYMPHOCYTES # BLD AUTO: 12.4 % — LOW (ref 13–44)
MAGNESIUM SERPL-MCNC: 2.3 MG/DL — SIGNIFICANT CHANGE UP (ref 1.6–2.6)
MCHC RBC-ENTMCNC: 30.4 PG — SIGNIFICANT CHANGE UP (ref 27–34)
MCHC RBC-ENTMCNC: 32.9 GM/DL — SIGNIFICANT CHANGE UP (ref 32–36)
MCV RBC AUTO: 92.3 FL — SIGNIFICANT CHANGE UP (ref 80–100)
MONOCYTES # BLD AUTO: 0.67 K/UL — SIGNIFICANT CHANGE UP (ref 0–0.9)
MONOCYTES NFR BLD AUTO: 10.5 % — SIGNIFICANT CHANGE UP (ref 2–14)
NEUTROPHILS # BLD AUTO: 4.68 K/UL — SIGNIFICANT CHANGE UP (ref 1.8–7.4)
NEUTROPHILS NFR BLD AUTO: 73.2 % — SIGNIFICANT CHANGE UP (ref 43–77)
NRBC # BLD: 0 /100 WBCS — SIGNIFICANT CHANGE UP (ref 0–0)
PLATELET # BLD AUTO: 172 K/UL — SIGNIFICANT CHANGE UP (ref 150–400)
POTASSIUM SERPL-MCNC: 4.1 MMOL/L — SIGNIFICANT CHANGE UP (ref 3.5–5.3)
POTASSIUM SERPL-SCNC: 4.1 MMOL/L — SIGNIFICANT CHANGE UP (ref 3.5–5.3)
RBC # BLD: 3.92 M/UL — LOW (ref 4.2–5.8)
RBC # FLD: 14.5 % — SIGNIFICANT CHANGE UP (ref 10.3–14.5)
SARS-COV-2 IGG+IGM SERPL QL IA: 0.07 INDEX — SIGNIFICANT CHANGE UP
SARS-COV-2 IGG+IGM SERPL QL IA: >250 U/ML — HIGH
SARS-COV-2 IGG+IGM SERPL QL IA: NEGATIVE — SIGNIFICANT CHANGE UP
SARS-COV-2 IGG+IGM SERPL QL IA: POSITIVE
SODIUM SERPL-SCNC: 145 MMOL/L — SIGNIFICANT CHANGE UP (ref 135–145)
WBC # BLD: 6.39 K/UL — SIGNIFICANT CHANGE UP (ref 3.8–10.5)
WBC # FLD AUTO: 6.39 K/UL — SIGNIFICANT CHANGE UP (ref 3.8–10.5)

## 2021-11-22 PROCEDURE — 99285 EMERGENCY DEPT VISIT HI MDM: CPT | Mod: 25

## 2021-11-22 PROCEDURE — 97163 PT EVAL HIGH COMPLEX 45 MIN: CPT

## 2021-11-22 PROCEDURE — 96374 THER/PROPH/DIAG INJ IV PUSH: CPT

## 2021-11-22 PROCEDURE — 86769 SARS-COV-2 COVID-19 ANTIBODY: CPT

## 2021-11-22 PROCEDURE — 85025 COMPLETE CBC W/AUTO DIFF WBC: CPT

## 2021-11-22 PROCEDURE — 76770 US EXAM ABDO BACK WALL COMP: CPT

## 2021-11-22 PROCEDURE — 80053 COMPREHEN METABOLIC PANEL: CPT

## 2021-11-22 PROCEDURE — 76770 US EXAM ABDO BACK WALL COMP: CPT | Mod: 26

## 2021-11-22 PROCEDURE — 99239 HOSP IP/OBS DSCHRG MGMT >30: CPT

## 2021-11-22 PROCEDURE — 83735 ASSAY OF MAGNESIUM: CPT

## 2021-11-22 PROCEDURE — 81001 URINALYSIS AUTO W/SCOPE: CPT

## 2021-11-22 PROCEDURE — 71046 X-RAY EXAM CHEST 2 VIEWS: CPT

## 2021-11-22 PROCEDURE — 71250 CT THORAX DX C-: CPT | Mod: MA

## 2021-11-22 PROCEDURE — 36415 COLL VENOUS BLD VENIPUNCTURE: CPT

## 2021-11-22 PROCEDURE — 93005 ELECTROCARDIOGRAM TRACING: CPT

## 2021-11-22 PROCEDURE — 83880 ASSAY OF NATRIURETIC PEPTIDE: CPT

## 2021-11-22 PROCEDURE — 80048 BASIC METABOLIC PNL TOTAL CA: CPT

## 2021-11-22 PROCEDURE — 0225U NFCT DS DNA&RNA 21 SARSCOV2: CPT

## 2021-11-22 RX ORDER — POLYETHYLENE GLYCOL 3350 17 G/17G
17 POWDER, FOR SOLUTION ORAL
Qty: 0 | Refills: 0 | DISCHARGE
Start: 2021-11-22

## 2021-11-22 RX ORDER — ACETAMINOPHEN 500 MG
2 TABLET ORAL
Qty: 0 | Refills: 0 | DISCHARGE
Start: 2021-11-22

## 2021-11-22 RX ORDER — POLYETHYLENE GLYCOL 3350 17 G/17G
17 POWDER, FOR SOLUTION ORAL DAILY
Refills: 0 | Status: DISCONTINUED | OUTPATIENT
Start: 2021-11-22 | End: 2021-11-22

## 2021-11-22 RX ADMIN — Medication 0.25 MILLIGRAM(S): at 17:10

## 2021-11-22 RX ADMIN — POLYETHYLENE GLYCOL 3350 17 GRAM(S): 17 POWDER, FOR SOLUTION ORAL at 11:02

## 2021-11-22 RX ADMIN — Medication 25 MILLIGRAM(S): at 06:34

## 2021-11-22 RX ADMIN — Medication 0.25 MILLIGRAM(S): at 06:34

## 2021-11-22 RX ADMIN — APIXABAN 2.5 MILLIGRAM(S): 2.5 TABLET, FILM COATED ORAL at 17:11

## 2021-11-22 RX ADMIN — Medication 200 MILLIGRAM(S): at 17:11

## 2021-11-22 RX ADMIN — ROBINUL 1 MILLIGRAM(S): 0.2 INJECTION INTRAMUSCULAR; INTRAVENOUS at 17:10

## 2021-11-22 RX ADMIN — ROBINUL 1 MILLIGRAM(S): 0.2 INJECTION INTRAMUSCULAR; INTRAVENOUS at 06:34

## 2021-11-22 RX ADMIN — Medication 25 MILLIGRAM(S): at 17:10

## 2021-11-22 RX ADMIN — AMLODIPINE BESYLATE 5 MILLIGRAM(S): 2.5 TABLET ORAL at 06:34

## 2021-11-22 RX ADMIN — PANTOPRAZOLE SODIUM 40 MILLIGRAM(S): 20 TABLET, DELAYED RELEASE ORAL at 07:36

## 2021-11-22 RX ADMIN — CLOPIDOGREL BISULFATE 75 MILLIGRAM(S): 75 TABLET, FILM COATED ORAL at 11:02

## 2021-11-22 RX ADMIN — BUPROPION HYDROCHLORIDE 150 MILLIGRAM(S): 150 TABLET, EXTENDED RELEASE ORAL at 11:02

## 2021-11-22 RX ADMIN — APIXABAN 2.5 MILLIGRAM(S): 2.5 TABLET, FILM COATED ORAL at 06:34

## 2021-11-22 NOTE — CONSULT NOTE ADULT - SUBJECTIVE AND OBJECTIVE BOX
Date/Time Patient Seen:  		  Referring MD:   Data Reviewed	       Patient is a 89y old  Male who presents with a chief complaint of cough and generalized weakness (21 Nov 2021 15:28)      Subjective/HPI    in bed  seen and examined  vs noted  labs reviewed  H and P reviewed  ER provider note reviewed  CT chest reviewed  RSV positive  on RA  verbal - alert -     History of Present Illness:  Reason for Admission: cough and generalized weakness  History of Present Illness:   89y/o M. with hx of HTN, HLD, Afib on Eliquis, Depression/anxiety, TIA, and BPH presenting from home with cough and generalized weakness.  Wife he developed a cough while in rehab and was discharged from rehab 1 1/2 weeks ago.  Since then the cough has gotten worse.  Pt. reports productive of greenish phlegm.  Feeling weak with decreased appetite.  He denies fever, SOB, CP, N/V, abdominal pain, or dysuria.  Wife states he had diarrhea 3 days ago and none since.  Rest of ROS negative.  In ED +RSV.  CT chest clear.  BUN/Cr 23/1.50.         PAST MEDICAL & SURGICAL HISTORY:  Anxiety    BPH (benign prostatic hyperplasia)    TIA (transient ischemic attack)    Back pain    Diverticulitis    Duodenal ulcer    Abdominal hernia    FAMILY HISTORY:  No pertinent family history in first degree relatives. No pertinent family history of: kidney disease.     Social History:  Social History (marital status, living situation, occupation, tobacco use, alcohol and drug use, and sexual history): No toxic habits    COVID vaccine Pfizer x 3     Tobacco Screening:  · Core Measure Site	Yes  · Has the patient used tobacco in the past 30 days?	No    Risk Assessment:    Present on Admission:  Deep Venous Thrombosis	no  Pulmonary Embolus	no     Heart Failure:  Does this patient have a history of or has been diagnosed with heart failure? no.        Medication list         MEDICATIONS  (STANDING):  ALPRAZolam 0.25 milliGRAM(s) Oral two times a day  amLODIPine   Tablet 5 milliGRAM(s) Oral daily  apixaban 2.5 milliGRAM(s) Oral every 12 hours  atorvastatin 10 milliGRAM(s) Oral at bedtime  buPROPion XL (24-Hour) . 150 milliGRAM(s) Oral daily  clopidogrel Tablet 75 milliGRAM(s) Oral daily  doxazosin 8 milliGRAM(s) Oral at bedtime  glycopyrrolate 1 milliGRAM(s) Oral two times a day  metoprolol tartrate 25 milliGRAM(s) Oral two times a day  pantoprazole    Tablet 40 milliGRAM(s) Oral before breakfast  sodium chloride 0.9%. 1000 milliLiter(s) (60 mL/Hr) IV Continuous <Continuous>    MEDICATIONS  (PRN):  acetaminophen     Tablet .. 650 milliGRAM(s) Oral every 6 hours PRN Temp greater or equal to 38C (100.4F), Mild Pain (1 - 3)  aluminum hydroxide/magnesium hydroxide/simethicone Suspension 30 milliLiter(s) Oral every 4 hours PRN Dyspepsia  guaiFENesin Oral Liquid (Sugar-Free) 200 milliGRAM(s) Oral every 6 hours PRN Cough  ondansetron Injectable 4 milliGRAM(s) IV Push every 8 hours PRN Nausea and/or Vomiting         Vitals log        ICU Vital Signs Last 24 Hrs  T(C): 36.5 (22 Nov 2021 05:37), Max: 37 (21 Nov 2021 20:33)  T(F): 97.7 (22 Nov 2021 05:37), Max: 98.6 (21 Nov 2021 20:33)  HR: 71 (22 Nov 2021 05:37) (68 - 85)  BP: 138/80 (22 Nov 2021 05:37) (112/72 - 163/81)  BP(mean): --  ABP: --  ABP(mean): --  RR: 18 (22 Nov 2021 05:37) (16 - 18)  SpO2: 94% (22 Nov 2021 05:37) (94% - 97%)           Input and Output:  I&O's Detail      Lab Data                        12.0   8.44  )-----------( 173      ( 21 Nov 2021 10:32 )             37.0     11-21    143  |  110<H>  |  23  ----------------------------<  114<H>  3.9   |  29  |  1.50<H>    Ca    8.6      21 Nov 2021 10:32    TPro  6.3  /  Alb  2.8<L>  /  TBili  0.8  /  DBili  x   /  AST  17  /  ALT  20  /  AlkPhos  83  11-21            Review of Systems	  cough  hard of hearing  weakness      Objective     Physical Examination  heart s1s2  lung dec BS  abd soft  head nc  verbal  alert  moves all extr  on RA      Pertinent Lab findings & Imaging      [place]:  NO   Adequate UO     I&O's Detail           Discussed with:     Cultures:	        Radiology    EXAM:  CT CHEST                            PROCEDURE DATE:  11/21/2021          INTERPRETATION:  EXAMINATION: CT CHEST    CLINICAL INDICATION: Cough.    TECHNIQUE: Noncontrast CT of the chest was obtained.    COMPARISON: None.    FINDINGS:    AIRWAYS AND LUNGS: Mild tracheal secretions.  The lungs are clear.    MEDIASTINUM AND PLEURA: There are no enlarged mediastinal, hilar or axillary lymph nodes. The visualized portion of the thyroid gland is unremarkable. Trace bilateral pleural effusion. There is no pneumothorax.    HEART AND VESSELS: The heart is normal in size.  There are atherosclerotic calcifications of the aorta and coronary arteries.  There is no pericardial effusion.  Aortic valve calcification.    UPPER ABDOMEN: Images of the upper abdomen demonstrate cholelithiasis. Large hiatal hernia..    BONES AND SOFT TISSUES: The bones are unremarkable.  The soft tissues are unremarkable.    TUBES/LINES: None.    IMPRESSION:  Clear lungs.    --- End of Report ---            YINKA ZARATE MD; Attending Radiologist  This document has been electronically signed. Nov 21 2021  1:19PM

## 2021-11-22 NOTE — PHYSICAL THERAPY INITIAL EVALUATION ADULT - ADDITIONAL COMMENTS
Patient lives with wife in private home with 0STE. Once inside, patient has 14 steps to 2nd floor with 1 hand rail. Patient ascend/descend stairs with two feet on each step. Patient has walk in shower and previously ambulated with cane in R hand. Patient was independent in all ADLs prior to admission.

## 2021-11-22 NOTE — DISCHARGE NOTE PROVIDER - NSDCCPCAREPLAN_GEN_ALL_CORE_FT
PRINCIPAL DISCHARGE DIAGNOSIS  Diagnosis: RSV infection  Assessment and Plan of Treatment: You prbablu have viral bronchitis due to RSV infection  CT ches negative for pneumonia  Take cough meds as need, Rubitussin  Meds per the list  F/u with PCP in 1 week or sooner if needed to      SECONDARY DISCHARGE DIAGNOSES  Diagnosis: Weakness  Assessment and Plan of Treatment:

## 2021-11-22 NOTE — CONSULT NOTE ADULT - ASSESSMENT
89y/o M. with hx of HTN, HLD, Afib on Eliquis, Depression, Anxiety, TIA, and BPH presenting with cough and generalized weakness.    RSV positive -   cough congestion weakness  robitussin PRN  Robinul for secretions  will consider Atrovent if cont to have "wet cough"  Tyelnol PRN  Isolation precs  CKD - likely - renal indices noted - enc PO hydration  Valv heart disease - AS - AF - HTN - on AC - cvs rx regimen and BP control - monitor for vol overload  BPH - rx regimen  GERD - PPI  monitor VS and HD and Sat  GOC documented - DNR DNI  on Anxiolytics - Emotional support -   Hard of hearing - assist with ADL and needs

## 2021-11-22 NOTE — DISCHARGE NOTE PROVIDER - NSDCMRMEDTOKEN_GEN_ALL_CORE_FT
acetaminophen 325 mg oral tablet: 2 tab(s) orally every 6 hours, As needed, Temp greater or equal to 38C (100.4F), Mild Pain (1 - 3)  ALPRAZolam 0.25 mg oral tablet: 1 tab(s) orally 2 times a day  amLODIPine 5 mg oral tablet: 1 tab(s) orally once a day  atorvastatin 10 mg oral tablet: 1 tab(s) orally once a day (at bedtime)  buPROPion 150 mg/12 hours (SR) oral tablet, extended release: 1 tab(s) orally once a day  clopidogrel 75 mg oral tablet: 1 tab(s) orally once a day  Eliquis 2.5 mg oral tablet: 1 tab(s) orally 2 times a day  glycopyrrolate 1 mg oral tablet: 1 tab(s) orally 2 times a day  guaiFENesin 100 mg/5 mL oral liquid: 10 milliliter(s) orally every 6 hours, As needed, Cough  hydroCHLOROthiazide 12.5 mg oral capsule: 1 cap(s) orally once a day  metoprolol tartrate 25 mg oral tablet: 1 tab(s) orally 2 times a day  pantoprazole 40 mg oral delayed release tablet: 1 tab(s) orally once a day  polyethylene glycol 3350 oral powder for reconstitution: 17 gram(s) orally once a day, As Needed for constipation   terazosin 10 mg oral capsule: 1 cap(s) orally once a day (at bedtime)   acetaminophen 325 mg oral tablet: 2 tab(s) orally every 6 hours, As needed, Temp greater or equal to 38C (100.4F), Mild Pain (1 - 3)  ALPRAZolam 0.25 mg oral tablet: 1 tab(s) orally 2 times a day  amLODIPine 5 mg oral tablet: 1 tab(s) orally once a day  atorvastatin 10 mg oral tablet: 1 tab(s) orally once a day (at bedtime)  buPROPion 150 mg/12 hours (SR) oral tablet, extended release: 1 tab(s) orally once a day  clopidogrel 75 mg oral tablet: 1 tab(s) orally once a day  Eliquis 2.5 mg oral tablet: 1 tab(s) orally 2 times a day  glycopyrrolate 1 mg oral tablet: 1 tab(s) orally 2 times a day  guaiFENesin 100 mg/5 mL oral liquid: 10 milliliter(s) orally every 6 hours, As needed, Cough  hydroCHLOROthiazide 12.5 mg oral capsule: 1 cap(s) orally once a day. Can restart on 11/23/21. F/u with PCP  metoprolol tartrate 25 mg oral tablet: 1 tab(s) orally 2 times a day  pantoprazole 40 mg oral delayed release tablet: 1 tab(s) orally once a day  polyethylene glycol 3350 oral powder for reconstitution: 17 gram(s) orally once a day, As Needed for constipation   terazosin 10 mg oral capsule: 1 cap(s) orally once a day (at bedtime)

## 2021-11-22 NOTE — DISCHARGE NOTE PROVIDER - HOSPITAL COURSE
89y/o M. with hx of HTN, HLD, Afib on Eliquis, Depression, Anxiety, TIA, and BPH presenting with cough and generalized weakness.  Found to have RSV infection and GLENN.  CT chest negative for pneumonia. Kidney function improved with hydration. Symptoms have improved and PT suggested home. Patient to f/u with PCP in 1 week or sooner if needed to. Seen and examined the day of discharge. VSS    GEN: NAD, AAOx3  HEENT: NCAT, PERRLA   Heart: S1S2+, IRRegular Rate and Rhythm  Lungs: Clear bilat, no wheezing   Abdomen: Soft, +BS   Extremities: No cyanosis, edema. erythema   Skin: Warm, dry, no rash   Neuro: AAOx3, no focal motor/sensory deficits     Total discharge time spent 48 minutes, including medication reconciliation, labs and vital review. Discussion with patient. 89y/o M. with hx of HTN, HLD, Afib on Eliquis, Depression, Anxiety, TIA, and BPH presenting with cough and generalized weakness.  Found to have RSV infection and GLENN.  CT chest negative for pneumonia. Kidney function improved with hydration. Symptoms have improved and PT suggested home. Seen and cleared by Pulm Dr. Valderrama. Patient to f/u with PCP in 1 week or sooner if needed to. Seen and examined the day of discharge. VSS    GEN: NAD, AAOx3  HEENT: NCAT, PERRLA   Heart: S1S2+, IRRegular Rate and Rhythm  Lungs: Clear bilat, no wheezing   Abdomen: Soft, +BS   Extremities: No cyanosis, edema. erythema   Skin: Warm, dry, no rash   Neuro: AAOx3, no focal motor/sensory deficits     Total discharge time spent 48 minutes, including medication reconciliation, labs and vital review. Discussion with patient.

## 2021-11-22 NOTE — DISCHARGE NOTE NURSING/CASE MANAGEMENT/SOCIAL WORK - NSDCVIVACCINE_GEN_ALL_CORE_FT
Tdap; 17-Aug-2018 19:22; Elsie Stevenson (RN); [employer]; [id number]; IntraMuscular; Deltoid Left.; 0.5 milliLiter(s); VIS (VIS Published: 09-May-2013, VIS Presented: 17-Aug-2018);

## 2021-11-22 NOTE — DISCHARGE NOTE NURSING/CASE MANAGEMENT/SOCIAL WORK - PATIENT PORTAL LINK FT
You can access the FollowMyHealth Patient Portal offered by [facility] by registering at the following website: [email or web address]. By joining [employer]’s FollowMyHealth portal, you will also be able to view your health information using other applications (apps) compatible with our system.

## 2021-11-22 NOTE — PHYSICAL THERAPY INITIAL EVALUATION ADULT - PERTINENT HX OF CURRENT PROBLEM, REHAB EVAL
89y/o M. with hx of HTN, HLD, Afib on Eliquis, Depression/anxiety, TIA, and BPH presenting from home with cough and generalized weakness, positive for RSV.

## 2021-12-02 ENCOUNTER — APPOINTMENT (OUTPATIENT)
Dept: UROLOGY | Facility: CLINIC | Age: 86
End: 2021-12-02

## 2021-12-05 ENCOUNTER — EMERGENCY (EMERGENCY)
Facility: HOSPITAL | Age: 86
LOS: 1 days | Discharge: HOME | End: 2021-12-05
Attending: EMERGENCY MEDICINE | Admitting: EMERGENCY MEDICINE
Payer: MEDICARE

## 2021-12-05 VITALS
TEMPERATURE: 98 F | WEIGHT: 177.03 LBS | OXYGEN SATURATION: 99 % | SYSTOLIC BLOOD PRESSURE: 172 MMHG | HEART RATE: 58 BPM | DIASTOLIC BLOOD PRESSURE: 90 MMHG | HEIGHT: 70 IN | RESPIRATION RATE: 18 BRPM

## 2021-12-05 DIAGNOSIS — K46.9 UNSPECIFIED ABDOMINAL HERNIA WITHOUT OBSTRUCTION OR GANGRENE: Chronic | ICD-10-CM

## 2021-12-05 LAB
ALBUMIN SERPL ELPH-MCNC: 3 G/DL — LOW (ref 3.3–5)
ALP SERPL-CCNC: 88 U/L — SIGNIFICANT CHANGE UP (ref 40–120)
ALT FLD-CCNC: 25 U/L — SIGNIFICANT CHANGE UP (ref 12–78)
ANION GAP SERPL CALC-SCNC: 3 MMOL/L — LOW (ref 5–17)
APPEARANCE UR: CLEAR — SIGNIFICANT CHANGE UP
AST SERPL-CCNC: 15 U/L — SIGNIFICANT CHANGE UP (ref 15–37)
BASOPHILS # BLD AUTO: 0.06 K/UL — SIGNIFICANT CHANGE UP (ref 0–0.2)
BASOPHILS NFR BLD AUTO: 0.7 % — SIGNIFICANT CHANGE UP (ref 0–2)
BILIRUB SERPL-MCNC: 0.7 MG/DL — SIGNIFICANT CHANGE UP (ref 0.2–1.2)
BILIRUB UR-MCNC: NEGATIVE — SIGNIFICANT CHANGE UP
BUN SERPL-MCNC: 32 MG/DL — HIGH (ref 7–23)
CALCIUM SERPL-MCNC: 9.1 MG/DL — SIGNIFICANT CHANGE UP (ref 8.5–10.1)
CHLORIDE SERPL-SCNC: 113 MMOL/L — HIGH (ref 96–108)
CO2 SERPL-SCNC: 30 MMOL/L — SIGNIFICANT CHANGE UP (ref 22–31)
COLOR SPEC: YELLOW — SIGNIFICANT CHANGE UP
CREAT SERPL-MCNC: 1.5 MG/DL — HIGH (ref 0.5–1.3)
DIFF PNL FLD: NEGATIVE — SIGNIFICANT CHANGE UP
EOSINOPHIL # BLD AUTO: 0.3 K/UL — SIGNIFICANT CHANGE UP (ref 0–0.5)
EOSINOPHIL NFR BLD AUTO: 3.6 % — SIGNIFICANT CHANGE UP (ref 0–6)
GLUCOSE SERPL-MCNC: 113 MG/DL — HIGH (ref 70–99)
GLUCOSE UR QL: NEGATIVE — SIGNIFICANT CHANGE UP
HCT VFR BLD CALC: 39.5 % — SIGNIFICANT CHANGE UP (ref 39–50)
HGB BLD-MCNC: 13.6 G/DL — SIGNIFICANT CHANGE UP (ref 13–17)
IMM GRANULOCYTES NFR BLD AUTO: 1.6 % — HIGH (ref 0–1.5)
KETONES UR-MCNC: NEGATIVE — SIGNIFICANT CHANGE UP
LEUKOCYTE ESTERASE UR-ACNC: NEGATIVE — SIGNIFICANT CHANGE UP
LIDOCAIN IGE QN: 73 U/L — SIGNIFICANT CHANGE UP (ref 73–393)
LYMPHOCYTES # BLD AUTO: 0.86 K/UL — LOW (ref 1–3.3)
LYMPHOCYTES # BLD AUTO: 10.4 % — LOW (ref 13–44)
MCHC RBC-ENTMCNC: 31.3 PG — SIGNIFICANT CHANGE UP (ref 27–34)
MCHC RBC-ENTMCNC: 34.4 GM/DL — SIGNIFICANT CHANGE UP (ref 32–36)
MCV RBC AUTO: 90.8 FL — SIGNIFICANT CHANGE UP (ref 80–100)
MONOCYTES # BLD AUTO: 0.83 K/UL — SIGNIFICANT CHANGE UP (ref 0–0.9)
MONOCYTES NFR BLD AUTO: 10.1 % — SIGNIFICANT CHANGE UP (ref 2–14)
NEUTROPHILS # BLD AUTO: 6.07 K/UL — SIGNIFICANT CHANGE UP (ref 1.8–7.4)
NEUTROPHILS NFR BLD AUTO: 73.6 % — SIGNIFICANT CHANGE UP (ref 43–77)
NITRITE UR-MCNC: NEGATIVE — SIGNIFICANT CHANGE UP
NRBC # BLD: 0 /100 WBCS — SIGNIFICANT CHANGE UP (ref 0–0)
PH UR: 7 — SIGNIFICANT CHANGE UP (ref 5–8)
PLATELET # BLD AUTO: 195 K/UL — SIGNIFICANT CHANGE UP (ref 150–400)
POTASSIUM SERPL-MCNC: 4.6 MMOL/L — SIGNIFICANT CHANGE UP (ref 3.5–5.3)
POTASSIUM SERPL-SCNC: 4.6 MMOL/L — SIGNIFICANT CHANGE UP (ref 3.5–5.3)
PROT SERPL-MCNC: 6.6 G/DL — SIGNIFICANT CHANGE UP (ref 6–8.3)
PROT UR-MCNC: 30 MG/DL
RBC # BLD: 4.35 M/UL — SIGNIFICANT CHANGE UP (ref 4.2–5.8)
RBC # FLD: 14.4 % — SIGNIFICANT CHANGE UP (ref 10.3–14.5)
SODIUM SERPL-SCNC: 146 MMOL/L — HIGH (ref 135–145)
SP GR SPEC: 1.01 — SIGNIFICANT CHANGE UP (ref 1.01–1.02)
UROBILINOGEN FLD QL: 1
WBC # BLD: 8.25 K/UL — SIGNIFICANT CHANGE UP (ref 3.8–10.5)
WBC # FLD AUTO: 8.25 K/UL — SIGNIFICANT CHANGE UP (ref 3.8–10.5)

## 2021-12-05 PROCEDURE — 81001 URINALYSIS AUTO W/SCOPE: CPT

## 2021-12-05 PROCEDURE — 96361 HYDRATE IV INFUSION ADD-ON: CPT | Mod: XU

## 2021-12-05 PROCEDURE — 85025 COMPLETE CBC W/AUTO DIFF WBC: CPT

## 2021-12-05 PROCEDURE — 87086 URINE CULTURE/COLONY COUNT: CPT

## 2021-12-05 PROCEDURE — 74176 CT ABD & PELVIS W/O CONTRAST: CPT | Mod: MA

## 2021-12-05 PROCEDURE — 99284 EMERGENCY DEPT VISIT MOD MDM: CPT | Mod: 25

## 2021-12-05 PROCEDURE — 80053 COMPREHEN METABOLIC PANEL: CPT

## 2021-12-05 PROCEDURE — 51702 INSERT TEMP BLADDER CATH: CPT

## 2021-12-05 PROCEDURE — 74176 CT ABD & PELVIS W/O CONTRAST: CPT | Mod: 26,MA

## 2021-12-05 PROCEDURE — 96365 THER/PROPH/DIAG IV INF INIT: CPT | Mod: XU

## 2021-12-05 PROCEDURE — 99284 EMERGENCY DEPT VISIT MOD MDM: CPT

## 2021-12-05 PROCEDURE — 83690 ASSAY OF LIPASE: CPT

## 2021-12-05 PROCEDURE — 36415 COLL VENOUS BLD VENIPUNCTURE: CPT

## 2021-12-05 RX ORDER — CEFUROXIME AXETIL 250 MG
1 TABLET ORAL
Qty: 14 | Refills: 0
Start: 2021-12-05 | End: 2021-12-11

## 2021-12-05 RX ORDER — TAMSULOSIN HYDROCHLORIDE 0.4 MG/1
1 CAPSULE ORAL
Qty: 7 | Refills: 0
Start: 2021-12-05 | End: 2021-12-11

## 2021-12-05 RX ORDER — CEFTRIAXONE 500 MG/1
1000 INJECTION, POWDER, FOR SOLUTION INTRAMUSCULAR; INTRAVENOUS ONCE
Refills: 0 | Status: COMPLETED | OUTPATIENT
Start: 2021-12-05 | End: 2021-12-05

## 2021-12-05 RX ORDER — SODIUM CHLORIDE 9 MG/ML
1000 INJECTION INTRAMUSCULAR; INTRAVENOUS; SUBCUTANEOUS ONCE
Refills: 0 | Status: COMPLETED | OUTPATIENT
Start: 2021-12-05 | End: 2021-12-05

## 2021-12-05 RX ADMIN — SODIUM CHLORIDE 1000 MILLILITER(S): 9 INJECTION INTRAMUSCULAR; INTRAVENOUS; SUBCUTANEOUS at 13:47

## 2021-12-05 RX ADMIN — CEFTRIAXONE 100 MILLIGRAM(S): 500 INJECTION, POWDER, FOR SOLUTION INTRAMUSCULAR; INTRAVENOUS at 14:58

## 2021-12-05 RX ADMIN — SODIUM CHLORIDE 1000 MILLILITER(S): 9 INJECTION INTRAMUSCULAR; INTRAVENOUS; SUBCUTANEOUS at 12:47

## 2021-12-05 RX ADMIN — CEFTRIAXONE 1000 MILLIGRAM(S): 500 INJECTION, POWDER, FOR SOLUTION INTRAMUSCULAR; INTRAVENOUS at 15:30

## 2021-12-05 NOTE — ED ADULT NURSE NOTE - OBJECTIVE STATEMENT
patient alert and oriented presenting to ED with complaints of urinary incontinence and diarrhea.   patient also states he was having bouts of constipation and was taking too much Miralax resulting in the diarrhea.  no other complaints at this time.  pt is very hard of hearing and sometimes delayed in his resp[onses this is his baseline as per wife at bedside

## 2021-12-05 NOTE — ED ADULT NURSE NOTE - ED STAT RN HANDOFF DETAILS
discharge instructions explained and given to patient and wife.  both instructed how to empty leg bag.  patient to follow up with urologist as OP.

## 2021-12-05 NOTE — ED PROVIDER NOTE - CLINICAL SUMMARY MEDICAL DECISION MAKING FREE TEXT BOX
89 y/o M PMHx anxiety, BPH, diverticulitis c/o diarrhea x 3 day. Also has been having issues holding his urine. - left reducible hernia, nontender abd, well-appearing - labs

## 2021-12-05 NOTE — ED PROVIDER NOTE - OBJECTIVE STATEMENT
89 y/o M PMHx anxiety, BPH, diverticulitis c/o diarrhea x 3 day. Also has been having increased issues holding his urine over the past few weeks. Pt has been on daily miralax due to issues with constipation, wife states he has been taking more than he should. Admits to mild abd discomfort. Denies fevers, CP, SOB, bleeding, melena. 89 y/o M PMHx anxiety, BPH, diverticulitis c/o diarrhea x 3 day. Also has been having issues holding his urine. States "I can't control my urine". Pt has been on daily miralax due to issues with constipation, wife states he has been taking more than he should. Admits to mild abd discomfort. Denies fevers, CP, SOB, bleeding, melena.

## 2021-12-05 NOTE — ED PROVIDER NOTE - PATIENT PORTAL LINK FT
You can access the FollowMyHealth Patient Portal offered by [facility] by registering at the following website: [email or web address]. By joining [employer]’s FollowMyHealth portal, you will also be able to view your health information using other applications (apps) compatible with our system.

## 2021-12-05 NOTE — ED PROVIDER NOTE - NSFOLLOWUPINSTRUCTIONS_ED_ALL_ED_FT
Keep bella in place until you follow up with your urologist. Call tomorrow to schedule an appointment.  Take ceftin twice a day.  Take flomax daily before bed.  Return to the Emergency Department for worsening or concerning symptoms.

## 2021-12-05 NOTE — ED PROVIDER NOTE - PROGRESS NOTE DETAILS
bladder scan 522 improvement after bella; discussed results of lab/CT with pt; will rx deftin, flomax; has urologist he will follow up with; return precautions discussed

## 2021-12-05 NOTE — ED PROVIDER NOTE - ATTENDING CONTRIBUTION TO CARE
89 male states he had constipation, took miralax, now having loose stools, for past few days having urinary urgency, burning feels he cannot empty his bladder, patient hard of hearing, alert, abdomen soft, bladder distended, f/u labs, ua, bladder scan, ct renal, iv fluids, antibiotics for UTI, re-eval.

## 2021-12-07 LAB
CULTURE RESULTS: SIGNIFICANT CHANGE UP
SPECIMEN SOURCE: SIGNIFICANT CHANGE UP

## 2021-12-09 ENCOUNTER — APPOINTMENT (OUTPATIENT)
Dept: UROLOGY | Facility: CLINIC | Age: 86
End: 2021-12-09
Payer: MEDICARE

## 2021-12-09 VITALS
DIASTOLIC BLOOD PRESSURE: 83 MMHG | WEIGHT: 175 LBS | HEIGHT: 70 IN | BODY MASS INDEX: 25.05 KG/M2 | HEART RATE: 77 BPM | TEMPERATURE: 97.8 F | SYSTOLIC BLOOD PRESSURE: 145 MMHG

## 2021-12-09 DIAGNOSIS — Z78.9 OTHER SPECIFIED HEALTH STATUS: ICD-10-CM

## 2021-12-09 PROCEDURE — 99203 OFFICE O/P NEW LOW 30 MIN: CPT

## 2021-12-09 RX ORDER — METOPROLOL TARTRATE 75 MG/1
TABLET, FILM COATED ORAL
Refills: 0 | Status: ACTIVE | COMMUNITY

## 2021-12-09 RX ORDER — ASPIRIN 81 MG/1
81 TABLET ORAL
Refills: 0 | Status: ACTIVE | COMMUNITY

## 2021-12-09 RX ORDER — BUPROPION HYDROCHLORIDE 150 MG/1
150 TABLET, EXTENDED RELEASE ORAL
Refills: 0 | Status: ACTIVE | COMMUNITY

## 2021-12-09 RX ORDER — ATORVASTATIN CALCIUM 80 MG/1
TABLET, FILM COATED ORAL
Refills: 0 | Status: ACTIVE | COMMUNITY

## 2021-12-09 NOTE — ASSESSMENT
[FreeTextEntry1] : Urinary retention:\par c/w flomax\par f/u 1 week for TOV\par pt can restart Eliquis as he doesn't have any active hematuria\par hydrate well

## 2021-12-09 NOTE — PHYSICAL EXAM
[General Appearance - Well Developed] : well developed [General Appearance - Well Nourished] : well nourished [Normal Appearance] : normal appearance [Well Groomed] : well groomed [General Appearance - In No Acute Distress] : no acute distress [Edema] : no peripheral edema [Respiration, Rhythm And Depth] : normal respiratory rhythm and effort [Exaggerated Use Of Accessory Muscles For Inspiration] : no accessory muscle use [Abdomen Soft] : soft [Abdomen Tenderness] : non-tender [Costovertebral Angle Tenderness] : no ~M costovertebral angle tenderness [Urethral Meatus] : meatus normal [FreeTextEntry1] : walks with a cane [] : no rash [No Focal Deficits] : no focal deficits [Oriented To Time, Place, And Person] : oriented to person, place, and time

## 2021-12-09 NOTE — REVIEW OF SYSTEMS
[Blood in urine that you can see] : blood visible in urine [Wake up at night to urinate  How many times?  ___] : wakes up to urinate [unfilled] times during the night [Slow urine stream] : slow urine stream [Negative] : Endocrine [see HPI] : see HPI [Arthralgias] : arthralgias [Difficulty Walking] : difficulty walking

## 2021-12-09 NOTE — HISTORY OF PRESENT ILLNESS
[FreeTextEntry1] : 89M hx of Afib on Eliquis, plavix, ASA presents for urinary retention \par Pt was in ED 4 days ago with retention and bella catheter was inserted. >600cc urine output\par Pt reports he had gross hematuria yesterday and his cardiologist advised to stop Eliquis\par He was start on tamsulosin from hospital \par Denies any fever, chills, flank or abdominal pain

## 2021-12-10 ENCOUNTER — NON-APPOINTMENT (OUTPATIENT)
Age: 86
End: 2021-12-10

## 2021-12-13 ENCOUNTER — NON-APPOINTMENT (OUTPATIENT)
Age: 86
End: 2021-12-13

## 2021-12-16 ENCOUNTER — APPOINTMENT (OUTPATIENT)
Dept: UROLOGY | Facility: CLINIC | Age: 86
End: 2021-12-16
Payer: MEDICARE

## 2021-12-16 PROCEDURE — A4218: CPT | Mod: NC

## 2021-12-16 PROCEDURE — 51700 IRRIGATION OF BLADDER: CPT

## 2021-12-30 ENCOUNTER — APPOINTMENT (OUTPATIENT)
Dept: UROLOGY | Facility: CLINIC | Age: 86
End: 2021-12-30
Payer: MEDICARE

## 2021-12-30 VITALS — TEMPERATURE: 98 F

## 2021-12-30 DIAGNOSIS — R35.0 FREQUENCY OF MICTURITION: ICD-10-CM

## 2021-12-30 PROCEDURE — 51798 US URINE CAPACITY MEASURE: CPT

## 2021-12-30 PROCEDURE — 99213 OFFICE O/P EST LOW 20 MIN: CPT

## 2021-12-30 NOTE — HISTORY OF PRESENT ILLNESS
[FreeTextEntry1] : 89M hx of Afib on Eliquis, plavix, ASA presents for f/u urinary retention \par PVR today 138\par pt reports dysuria intermittently since had catheter removed 2 weeks ago. \par He also reports nocturia every hour that is bothersome\par \par Denies any fever, chills, flank or abdominal pain

## 2021-12-30 NOTE — ASSESSMENT
[FreeTextEntry1] : Urinary retention:\par c/w flomax\par start finasteride\par \par Dysuria:\par check urine culture\par pt unable to provide sample today. Will drop off tomorrow

## 2021-12-30 NOTE — REVIEW OF SYSTEMS
[see HPI] : see HPI [Dysuria] : dysuria [Blood in urine that you can see] : blood visible in urine [Wake up at night to urinate  How many times?  ___] : wakes up to urinate [unfilled] times during the night [Slow urine stream] : slow urine stream [Arthralgias] : arthralgias [Difficulty Walking] : difficulty walking [Negative] : Endocrine

## 2022-01-08 LAB — BACTERIA UR CULT: NORMAL

## 2022-03-14 ENCOUNTER — APPOINTMENT (OUTPATIENT)
Dept: UROLOGY | Facility: CLINIC | Age: 87
End: 2022-03-14
Payer: MEDICARE

## 2022-03-14 VITALS
HEIGHT: 70 IN | BODY MASS INDEX: 25.05 KG/M2 | DIASTOLIC BLOOD PRESSURE: 107 MMHG | RESPIRATION RATE: 14 BRPM | HEART RATE: 62 BPM | SYSTOLIC BLOOD PRESSURE: 184 MMHG | WEIGHT: 175 LBS

## 2022-03-14 DIAGNOSIS — R35.0 FREQUENCY OF MICTURITION: ICD-10-CM

## 2022-03-14 DIAGNOSIS — R33.9 RETENTION OF URINE, UNSPECIFIED: ICD-10-CM

## 2022-03-14 PROCEDURE — 99213 OFFICE O/P EST LOW 20 MIN: CPT

## 2022-03-14 PROCEDURE — 51798 US URINE CAPACITY MEASURE: CPT

## 2022-03-24 NOTE — ED ADULT NURSE NOTE - TEMPLATE
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  She denies any postmenopausal bleeding.  She has been postmenopausal for 15 years.  There is no family history of uterine ovarian or cervical cancer.    She has been pregnant 3 times but has only 1 vaginal delivery.  She is not on any hormones.  She is not been treated for other cancers in the past including breast cancer.  She does have a history of epilepsy most recently had recurrence about 3 weeks ago.    Assessment/plan    I carefully reviewed colposcopy with her and discussed how the procedure would be performed and what she should expect.  I did recommend that she take ibuprofen 600 mg an hour before the procedure.  Importance of endometrial biopsy to rule out any occult neoplastic process was reviewed as well.  We will schedule this sometime in the very near future.  She was given opportunity ask questions and they were all answered to her satisfaction.  I spent 25 minutes in face-to-face contact with the patient.  
Respiratory

## 2022-06-20 ENCOUNTER — APPOINTMENT (OUTPATIENT)
Dept: UROLOGY | Facility: CLINIC | Age: 87
End: 2022-06-20
Payer: MEDICARE

## 2022-06-20 ENCOUNTER — NON-APPOINTMENT (OUTPATIENT)
Age: 87
End: 2022-06-20

## 2022-06-20 VITALS
WEIGHT: 178 LBS | BODY MASS INDEX: 25.48 KG/M2 | DIASTOLIC BLOOD PRESSURE: 103 MMHG | TEMPERATURE: 96.6 F | SYSTOLIC BLOOD PRESSURE: 190 MMHG | HEART RATE: 103 BPM | HEIGHT: 70 IN | RESPIRATION RATE: 14 BRPM

## 2022-06-20 DIAGNOSIS — N40.1 BENIGN PROSTATIC HYPERPLASIA WITH LOWER URINARY TRACT SYMPMS: ICD-10-CM

## 2022-06-20 DIAGNOSIS — K59.00 CONSTIPATION, UNSPECIFIED: ICD-10-CM

## 2022-06-20 DIAGNOSIS — N13.8 BENIGN PROSTATIC HYPERPLASIA WITH LOWER URINARY TRACT SYMPMS: ICD-10-CM

## 2022-06-20 PROCEDURE — 99213 OFFICE O/P EST LOW 20 MIN: CPT

## 2022-06-20 RX ORDER — TAMSULOSIN HYDROCHLORIDE 0.4 MG/1
0.4 CAPSULE ORAL DAILY
Qty: 180 | Refills: 1 | Status: ACTIVE | COMMUNITY
Start: 2021-12-10 | End: 1900-01-01

## 2022-06-20 NOTE — HISTORY OF PRESENT ILLNESS
[FreeTextEntry1] : 89M hx of Afib on Eliquis, plavix, ASA presents for f/u BPH, frequent urination\par PVR today 150cc\par Pt reports he has urinary frequency and urgency. Urinates every 30 min - 1 hour during day and has to get up 2-3 times at night.\par Pt also reports heavy constipation. He has tried enema, mirlax but nothing seems to help. I advised him to hydrate well during daytime. \par \par Hx of urinary retention Dec 2021\par Denies any fever, chills, flank or abdominal pain

## 2022-06-20 NOTE — ASSESSMENT
[FreeTextEntry1] : Urinary retention:\par start double flomax. I made patient aware of dizziness side effect. If his dizziness worsens, he will stop the extra capsule.\par c/w finasteride\par Pt has persistent urinary frequency and bladder scan shows incomplete voiding\par spoke with pcp and unable to stop Wellbutrin \par f/u in 3 months or sooner. If symptoms persist will consider 5mg cialis\par

## 2022-06-20 NOTE — LETTER BODY
[Dear  ___] : Dear  [unfilled], [Consult Letter:] : I had the pleasure of evaluating your patient, [unfilled]. [FreeTextEntry3] : Brittaney Vargas, \par Genitourinary Medicine\par [facility]\par

## 2022-06-27 ENCOUNTER — APPOINTMENT (OUTPATIENT)
Dept: ORTHOPEDIC SURGERY | Facility: CLINIC | Age: 87
End: 2022-06-27

## 2022-06-27 VITALS — BODY MASS INDEX: 25.48 KG/M2 | HEIGHT: 70 IN | WEIGHT: 178 LBS

## 2022-06-27 DIAGNOSIS — M54.16 RADICULOPATHY, LUMBAR REGION: ICD-10-CM

## 2022-06-27 PROCEDURE — 72110 X-RAY EXAM L-2 SPINE 4/>VWS: CPT

## 2022-06-27 PROCEDURE — 99203 OFFICE O/P NEW LOW 30 MIN: CPT

## 2022-06-27 NOTE — PHYSICAL EXAM
[NL (30)] : right lateral rotation 30 degrees [NL (45)] : extension 45 degrees [NL (40)] : right lateral bending 40 degrees [5___] : right extensor hallicus longus 5[unfilled]/5 [Outside films reviewed] : Outside films reviewed [de-identified] : Constitutional:\par -  General Appearance: \par Unremarkable\par Body Habitus\par Well Developed \par Well Nourished\par Body Habitus\par No Deformities\par Well Groomed\par \par Ability To communicate:\par Normal\par \par Neurologic: \par Global sensation is intact to upper and lower extremities.  See examination of Neck and/or Spine for exceptions.\par Orientation to Time, Place and Person is: Normal\par Mood And Affect is Normal\par \par Skin:\par -  Head/Face, Right Upper/Lower Extremity, Left Upper/Lower Extremity: Normal\par See Examination of Neck and/or Spine for exceptions\par \par Cardiovascular:\par Peripheral Cardiovascular System is Normal\par \par Palpation of Lymph Nodes:\par Normal Palpation of lymph nodes in: Axilla, Cervical, Inguinal\par Abnormal Palpation of lymph nodes in: None  [] : non-antalgic [FreeTextEntry9] : 20 degrees forward flexion [FreeTextEntry1] : Lumbar Spine X-Rays Taken (06/27/22) - IN-HOUSE [facility]\par On my interpretation of the images\par 1) 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

## 2022-06-27 NOTE — ASSESSMENT
[FreeTextEntry1] : 89 y/o male with left low back for 6 wk after a trauma. Given the patients age and recent inciting event (fall), I am requesting a lumbar spine non-contrast MRI to evaluate for occult fracture of lumbar spine or sacrum vs stenosis.  I would like to follow up with the patient in 2 weeks to assess her response to conservative care. \par \par Prior to appointment and during encounter with patient extensive medical records were reviewed including but not limited to, hospital records, out patient records, imaging results, and lab data. During this appointment the patient was examined, diagnoses were discussed and explained in a face to face manner. In addition extensive time was spent reviewing aforementioned diagnostic studies. Counseling including abnormal image results, differential diagnoses, treatment options, risk and benefits, lifestyle changes, current condition, and current medications was performed. Patient's comments, questions, and concerns were address and patient verbalized understanding. Based on this patient's presentation at our office, which is an orthopedic spine surgeon's office, this patient inherently / intrinsically has a risk, however minute, of developing issues such as Cauda equina syndrome, bowel and bladder changes, or progression of motor or neurological deficits such as paralysis which may be permanent.\par \par I, Yfn Hernandez, attest that this documentation has been prepared under the direction and in the presence of provider Arnaldo Vallejo MD.

## 2022-06-27 NOTE — HISTORY OF PRESENT ILLNESS
[de-identified] : 89 y/o male presents for his initial consultation. Patient c/o lower back pain that was a result of a fall that took place 1 month ago. Patient denies any lower extremity pain. Patient reports that he has tried taking Aleve and Tylenol which he finds mildly helpful. Patient has been taking the medications QHS.

## 2022-06-27 NOTE — REASON FOR VISIT
[Spouse] : spouse [FreeTextEntry2] : Low back and left leg pain after picking up a laundry basket and falling

## 2022-06-28 ENCOUNTER — FORM ENCOUNTER (OUTPATIENT)
Age: 87
End: 2022-06-28

## 2022-06-29 ENCOUNTER — APPOINTMENT (OUTPATIENT)
Dept: MRI IMAGING | Facility: CLINIC | Age: 87
End: 2022-06-29

## 2022-06-29 PROCEDURE — 72148 MRI LUMBAR SPINE W/O DYE: CPT | Mod: MH

## 2022-07-11 ENCOUNTER — APPOINTMENT (OUTPATIENT)
Dept: ORTHOPEDIC SURGERY | Facility: CLINIC | Age: 87
End: 2022-07-11

## 2022-07-11 VITALS — BODY MASS INDEX: 25.48 KG/M2 | HEIGHT: 70 IN | WEIGHT: 178 LBS

## 2022-07-11 PROCEDURE — 99214 OFFICE O/P EST MOD 30 MIN: CPT

## 2022-07-13 NOTE — ASSESSMENT
[FreeTextEntry1] : 89 y/o male with mild T12 compression fracture. I advised the patient to continue taking OTC NSAIDs prn. Patient defers narcotic pain medications at this time, citing his pain is not severe enough to warrant it. I would like to follow up with the patient in 1 month to re-evaluate the status of his condition. \par \par Prior to appointment and during encounter with patient extensive medical records were reviewed including but not limited to, hospital records, out patient records, imaging results, and lab data. During this appointment the patient was examined, diagnoses were discussed and explained in a face to face manner. In addition extensive time was spent reviewing aforementioned diagnostic studies. Counseling including abnormal image results, differential diagnoses, treatment options, risk and benefits, lifestyle changes, current condition, and current medications was performed. Patient's comments, questions, and concerns were address and patient verbalized understanding. Based on this patient's presentation at our office, which is an orthopedic spine surgeon's office, this patient inherently / intrinsically has a risk, however minute, of developing issues such as Cauda equina syndrome, bowel and bladder changes, or progression of motor or neurological deficits such as paralysis which may be permanent.\par \par I, Yfn Hernandez, attest that this documentation has been prepared under the direction and in the presence of provider Arnaldo Vallejo MD.

## 2022-07-13 NOTE — DATA REVIEWED
[MRI] : MRI [Lumbar Spine] : lumbar spine [I independently reviewed and interpreted images and report] : I independently reviewed and interpreted images and report [FreeTextEntry1] : Lumbar spine MRI taken (06/29/22) - [facility] \par On my interpretation of the images\par L5-S1 mild DDD  \par L4-5 Moderate facet arthritis, moderate stenosis\par L3-4 mild DDD, mild foraminal stneosis\par L2-3 mild DDD, mild stenosis \par L1-2 mild DDD, no stenosis \par T12-L1 normal \par *Superior endplate of T12 shows EDEMA that is consistent with non-displaced fracture\par **Sacrum is not demonstrating any EDEMA

## 2022-07-13 NOTE — PHYSICAL EXAM
[NL (90)] : forward flexion 90 degrees [NL (30)] : right lateral rotation 30 degrees [NL (45)] : extension 45 degrees [NL (40)] : right lateral bending 40 degrees [5___] : right extensor hallicus longus 5[unfilled]/5 [de-identified] : Constitutional:\par -  General Appearance: \par Unremarkable\par Body Habitus\par Well Developed \par Well Nourished\par Body Habitus\par No Deformities\par Well Groomed\par \par Ability To communicate:\par Normal\par \par Neurologic: \par Global sensation is intact to upper and lower extremities.  See examination of Neck and/or Spine for exceptions.\par Orientation to Time, Place and Person is: Normal\par Mood And Affect is Normal\par \par Skin:\par -  Head/Face, Right Upper/Lower Extremity, Left Upper/Lower Extremity: Normal\par See Examination of Neck and/or Spine for exceptions\par \par Cardiovascular:\par Peripheral Cardiovascular System is Normal\par \par Palpation of Lymph Nodes:\par Normal Palpation of lymph nodes in: Axilla, Cervical, Inguinal\par Abnormal Palpation of lymph nodes in: None  [] : patient ambulates with assistive device

## 2022-07-13 NOTE — HISTORY OF PRESENT ILLNESS
[de-identified] : 89 y/o male presents for a follow up evaluation and review of her lumbar spine MRI results. Patient c/o constant lower back pain, denies any radiation into his lower extremities.

## 2022-08-08 ENCOUNTER — APPOINTMENT (OUTPATIENT)
Dept: ORTHOPEDIC SURGERY | Facility: CLINIC | Age: 87
End: 2022-08-08

## 2022-09-09 ENCOUNTER — RX RENEWAL (OUTPATIENT)
Age: 87
End: 2022-09-09

## 2022-09-09 RX ORDER — FINASTERIDE 5 MG/1
5 TABLET, FILM COATED ORAL DAILY
Qty: 90 | Refills: 0 | Status: ACTIVE | COMMUNITY
Start: 2021-12-30 | End: 1900-01-01

## 2022-09-18 NOTE — ED ADULT NURSE NOTE - BREATH SOUNDS, MLM
Problem: Falls - Risk of  Goal: *Absence of Falls  Description: Document Emma Telly Fall Risk and appropriate interventions in the flowsheet. Outcome: Progressing Towards Goal  Note: Fall Risk Interventions:            Medication Interventions: Bed/chair exit alarm    Elimination Interventions: Bed/chair exit alarm, Call light in reach              Problem: Patient Education: Go to Patient Education Activity  Goal: Patient/Family Education  Outcome: Progressing Towards Goal     Problem: Pressure Injury - Risk of  Goal: *Prevention of pressure injury  Description: Document Erlin Scale and appropriate interventions in the flowsheet.   Outcome: Progressing Towards Goal  Note: Pressure Injury Interventions:  Sensory Interventions: Assess changes in LOC    Moisture Interventions: Absorbent underpads, Assess need for specialty bed, Limit adult briefs    Activity Interventions: Increase time out of bed    Mobility Interventions: Float heels, HOB 30 degrees or less, PT/OT evaluation    Nutrition Interventions: Document food/fluid/supplement intake    Friction and Shear Interventions: Apply protective barrier, creams and emollients, HOB 30 degrees or less, Minimize layers, Transferring/repositioning devices                Problem: Patient Education: Go to Patient Education Activity  Goal: Patient/Family Education  Outcome: Progressing Towards Goal Clear

## 2022-10-05 RX ORDER — OXYCODONE 5 MG/1
5 TABLET ORAL
Qty: 30 | Refills: 0 | Status: ACTIVE | COMMUNITY
Start: 2022-10-05 | End: 1900-01-01

## 2022-10-07 ENCOUNTER — NON-APPOINTMENT (OUTPATIENT)
Age: 87
End: 2022-10-07

## 2022-10-07 NOTE — ED ADULT NURSE NOTE - CAS EDP DISCH DISPOSITION ADMI
Chart reviewed. Will refill script #90 days. Pt due for follow up visit. Please have her schedule.   
Last office visit 3-8-22, last refill 7-11-22 was told at that time to schedule appointment. No appointments have been scheduled.    
Message left for patient that she needs to call and schedule a follow up appointment for medication refills. Per Rosa Bertrand  
[facility]

## 2022-10-10 ENCOUNTER — APPOINTMENT (OUTPATIENT)
Dept: ORTHOPEDIC SURGERY | Facility: CLINIC | Age: 87
End: 2022-10-10

## 2022-10-10 VITALS — WEIGHT: 178 LBS | BODY MASS INDEX: 25.48 KG/M2 | HEIGHT: 70 IN

## 2022-10-10 DIAGNOSIS — G89.29 LOW BACK PAIN, UNSPECIFIED: ICD-10-CM

## 2022-10-10 DIAGNOSIS — M54.50 LOW BACK PAIN, UNSPECIFIED: ICD-10-CM

## 2022-10-10 DIAGNOSIS — M47.816 SPONDYLOSIS W/OUT MYELOPATHY OR RADICULOPATHY, LUMBAR REGION: ICD-10-CM

## 2022-10-10 DIAGNOSIS — S22.088D: ICD-10-CM

## 2022-10-10 PROCEDURE — 99214 OFFICE O/P EST MOD 30 MIN: CPT

## 2022-10-10 PROCEDURE — 72100 X-RAY EXAM L-S SPINE 2/3 VWS: CPT

## 2022-10-14 NOTE — PHYSICAL EXAM
[NL (90)] : forward flexion 90 degrees [NL (30)] : right lateral rotation 30 degrees [NL (45)] : extension 45 degrees [NL (40)] : right lateral bending 40 degrees [5___] : right extensor hallicus longus 5[unfilled]/5 [Outside films reviewed] : Outside films reviewed [de-identified] : Constitutional:\par -  General Appearance: \par Unremarkable\par Body Habitus\par Well Developed \par Well Nourished\par Body Habitus\par No Deformities\par Well Groomed\par \par Ability To communicate:\par Normal\par \par Neurologic: \par Global sensation is intact to upper and lower extremities.  See examination of Neck and/or Spine for exceptions.\par Orientation to Time, Place and Person is: Normal\par Mood And Affect is Normal\par \par Skin:\par -  Head/Face, Right Upper/Lower Extremity, Left Upper/Lower Extremity: Normal\par See Examination of Neck and/or Spine for exceptions\par \par Cardiovascular:\par Peripheral Cardiovascular System is Normal\par \par Palpation of Lymph Nodes:\par Normal Palpation of lymph nodes in: Axilla, Cervical, Inguinal\par Abnormal Palpation of lymph nodes in: None  [] : patient ambulates with assistive device [FreeTextEntry1] : Lumbar Spine X-Rays Taken (10/10/22) - IN-HOUSE [facility]\par On my interpretation of the images\par 1) Stable T12 mild superior endplate deformity\par 2) Multi-level DDD through the disc and facet joints \par 3) No new fractures\par 4) Moderate DDD

## 2022-10-14 NOTE — ASSESSMENT
[FreeTextEntry1] : 89 y/o male with T12 compression fracture. I advised the patient to continue taking OTC NSAIDs. Advised the patient that if his pain continues, then he may return for TPI's.  I would like to follow up with the patient on an as needed basis moving forward. \par \par Prior to appointment and during encounter with patient extensive medical records were reviewed including but not limited to, hospital records, out patient records, imaging results, and lab data. During this appointment the patient was examined, diagnoses were discussed and explained in a face to face manner. In addition extensive time was spent reviewing aforementioned diagnostic studies. Counseling including abnormal image results, differential diagnoses, treatment options, risk and benefits, lifestyle changes, current condition, and current medications was performed. Patient's comments, questions, and concerns were address and patient verbalized understanding. Based on this patient's presentation at our office, which is an orthopedic spine surgeon's office, this patient inherently / intrinsically has a risk, however minute, of developing issues such as Cauda equina syndrome, bowel and bladder changes, or progression of motor or neurological deficits such as paralysis which may be permanent.\par \par I, Yfn Hernandez, attest that this documentation has been prepared under the direction and in the presence of provider Arnaldo Vallejo MD.

## 2022-10-14 NOTE — HISTORY OF PRESENT ILLNESS
[de-identified] : 89 y/o male presents for a follow up evaluation. Patient c/o continued right sided lower back pain with a sudden onset of left sided lower back pain. Patient has been taking oxycodone BID with significant pain relief.  [de-identified] : no treatment at at this time

## 2022-11-09 NOTE — ED ADULT NURSE NOTE - NSFALLRSKASSESASSIST_ED_ALL_ED
Show Applicator Variable?: Yes Consent: The patient's consent was obtained including but not limited to risks of crusting, scabbing, blistering, scarring, darker or lighter pigmentary change, recurrence, incomplete removal and infection. Post-Care Instructions: I reviewed with the patient in detail post-care instructions. Patient is to wear sunprotection, and avoid picking at any of the treated lesions. Pt may apply Vaseline to crusted or scabbing areas. Number Of Freeze-Thaw Cycles: 3 freeze-thaw cycles Detail Level: Detailed Render Note In Bullet Format When Appropriate: No Duration Of Freeze Thaw-Cycle (Seconds): 10 yes

## 2023-01-26 NOTE — H&P ADULT - PROBLEM SELECTOR PLAN 8
Continue Regimen: Patient will begin suppressive dosing with 500 mg QD of valtrex Detail Level: Zone Render In Strict Bullet Format?: No Continue Regimen: Hydrocortisone 2.5% ointment prn only \\nRecommended Sulfa soap was found to have an ulcer 6 months ago with GI  continue on sucralfate 1g tid and protonix 40mg daily

## 2023-04-27 ENCOUNTER — APPOINTMENT (OUTPATIENT)
Dept: UROLOGY | Facility: CLINIC | Age: 88
End: 2023-04-27
Payer: MEDICARE

## 2023-04-27 VITALS — WEIGHT: 178 LBS | BODY MASS INDEX: 25.48 KG/M2 | HEIGHT: 70 IN

## 2023-04-27 PROCEDURE — 99213 OFFICE O/P EST LOW 20 MIN: CPT

## 2023-04-27 NOTE — REVIEW OF SYSTEMS
[Negative] : Heme/Lymph [see HPI] : see HPI [Nocturia] : nocturia [Blood in urine that you can see] : blood visible in urine [Wake up at night to urinate  How many times?  ___] : wakes up to urinate [unfilled] times during the night [Slow urine stream] : slow urine stream [Arthralgias] : arthralgias [Difficulty Walking] : difficulty walking

## 2023-04-27 NOTE — HISTORY OF PRESENT ILLNESS
[FreeTextEntry1] : 89M hx of Afib on Eliquis, plavix, ASA presents for urinary retention\par Pt's wife states bella catheter was placed in rehab few weeks ago due to retention. He was in hospital due to multiple falls. walks with walker now\par Catheter was removed but failed tov. \par He is on 1 capsule flomax and finasteride.\par \par Denies constipation\par Hx of urinary retention Dec 2021\par Denies any fever, chills, flank or abdominal pain

## 2023-04-27 NOTE — LETTER BODY
[Dear  ___] : Dear  [unfilled], [Consult Letter:] : I had the pleasure of evaluating your patient, [unfilled]. [Please see my note below.] : Please see my note below. [Consult Closing:] : Thank you very much for allowing me to participate in the care of this patient.  If you have any questions, please do not hesitate to contact me. [Sincerely,] : Sincerely, [FreeTextEntry3] : Brittaney Vargas, \par Genitourinary Medicine\par [facility]\par

## 2023-04-27 NOTE — PHYSICAL EXAM
[General Appearance - Well Developed] : well developed [General Appearance - Well Nourished] : well nourished [Normal Appearance] : normal appearance [Well Groomed] : well groomed [General Appearance - In No Acute Distress] : no acute distress [Edema] : no peripheral edema [] : no respiratory distress [Respiration, Rhythm And Depth] : normal respiratory rhythm and effort [Exaggerated Use Of Accessory Muscles For Inspiration] : no accessory muscle use [Oriented To Time, Place, And Person] : oriented to person, place, and time [No Focal Deficits] : no focal deficits

## 2023-04-27 NOTE — ASSESSMENT
[FreeTextEntry1] : Urinary retention:\par double flomax. If patient has falls again, will stop flomax and will need to be in catheter as permanent option. \par c/w finasteride\par Pt's wife states, pt isn't walking much now in rehab. Plans to transfer to assisted living and improve ambulation. \par f/u 1 month for TOV\par \par Pt is on trazodone for depression may affect urination. \par \par

## 2023-05-08 NOTE — ED PROVIDER NOTE - NSTIMEPROVIDERCAREINITIATE_GEN_ER
[place] 05-Dec-2021 11:13

## 2023-06-15 ENCOUNTER — APPOINTMENT (OUTPATIENT)
Dept: UROLOGY | Facility: CLINIC | Age: 88
End: 2023-06-15

## 2023-07-06 NOTE — ED ADULT NURSE NOTE - NS ED NURSE LEVEL OF CONSCIOUSNESS SPEECH
Candy's niece called in  this afternoon, she states her aunt was seen today by Dr. Kemar Webber and he said he was putting her on Lasix. She states it wasn't called into the pharmacy. She doesn't know what the name of the medication is. She can be reached at [phone].
Spoke with zenaida.  RX sent
Speaking Coherently

## 2023-07-17 NOTE — CONSULT NOTE ADULT - PROVIDER SPECIALTY LIST ADULT
"PSYCHIATRIC PROGRESS NOTE    Chief Complaint/Reason for follow-up: \"dissociative\" episodes on clozapine    Interval History:     Case discussed in multidisciplinary treatment team meeting and chart reviewed; significant for: WNL; cooperative with medications, no PRNs; over the weekend- she reported doing fine, denied dissociation, denied suicidal thoughts, reported feeling tired, clozapine level was drawn this morning    E&M and psychotherapy of psychoeducation and motivational interviewing modalities- on exam, pt reports that she slept well, remains tired during the daytime. She denies feeling depressed. Reports eating adequately. Spending time in bed, not attending groups. Has been reading the Hunger Games novels, brightens talking about this. Untidy casual appearance. Remains somewhat aloof/apathetic and adolescent. Denies episodes of dissociation. States her bedtime at home is variable, though estimated to be around midnight-1 AM. She reports after taking clozapine she feels sedated. She reports she has always taken the clozapine in equivalent split doses, and she would be agreeable to maintaining the current total daily dose and weighting the dose towards bedtime. We discussed making a gradual change, with the first step from 125 mg q12h to 100 mg every morning and 150 mg every night at bedtime, and how the entire dose could be gradually consolidated to once nightly dosing under the guidance of her outpatient psychiatrist pending course and response. Pt states she doesn't know if the medication has been helpful to her. \"What if I just stop?\" Even though she denies being depressed, psychotic, catatonic. States she did not start the clozapine until she was better and near the end of her [place] hospitalization. Reflected to her and provided a lot of psychoeducation about how she sounds like she was in a bad state, that the clozapine appears to have sustained her nonpsychotic, nondepressed, how she has stayed out " of the hospital, has not needed additional ECT. She acknowledges this. Cautioned her against any abrupt changes in the medication such as stopping the medication on her own and how this could contribute to decompensation. Asked her again about her alcohol use. She admits to drinking weekly until drunk but minimizes the seriousness of this, does not identify this as a problem. Suggested to her how this is dangerous, reviewed the risks of seizure with clozapine, bupropion, and how the alcohol can affect her mental status and the medications. She denies contemplation for complete cessation though acknowledges the recommendation to work at least on cutting back. She denies urges/cravings. She is not in AA, denies desire to do this. Provided m\otivational interviewing surrounding recovery. We also discussed how the metformin dose range goes higher, up to 2000 mg tdd, and how while she perceives this has not been helpful that she may yet benefit from titration and consultation to discuss alternatives in the [facility]. Will make referral. Pt denies suicidal thoughts, self-injurious behaviors. She requests discharge. She declines a program. Would like to return to individual level of care. We discussed plan for discharge tomorrow, pt is in agreement.     Cindi Schaffer; Mother; [phone] called multiple times, went to voicemail, and left a message    Sangita Schafferia; Aunt; [phone]  Called and spoke with aunt who reports that pt's mother is traveling, may not be reachable right now. Provided comprehensive update, reviewed course/plan, and answered all questions. Specifically discussed how pt remains stable, has not had episodes of dissociation. Provided update that clozapine level was drawn but that results take time. We discussed considerations above for shifting weight of clozapine dose, outpatient consultation in metabolic clinic. We also discussed her alcohol use  and aunt agrees pt has minimized this. We also discussed aspects of safety planning including removing access to lethal means and emergency/return precautions. Aunt states she observed pt gasping in sleep and we discussed how with pt's weight gain she may be at risk of JEOVANY, which can contribute significantly to both psychiatric and physical symptoms including excessive daytime sleepiness. Aunt in agreement with referral to sleep medicine. Aunt states pt has been adolescent-like, consistent with observations in the hospital. That pt has slept in PHPs, was kicked out of the last one, family is not interested in referral to a PHP or IOP, but return to individual level of care. Aunt requesting discharge, as early as today, wants pt to have chance to return to work. Aunt states that work was the major change that precipitated the hospitalization, how pt was not used to getting up early for work. We discussed how clozapine redistribution may help with this. We discussed discharge tomorrow as to achieve the first step of the clozapine redistribution. Aunt asks about supports for mother- we discussed [facility]. I also spoke about [facility] to help with pt's recovery from alcohol. Aunt supportive of hospitalization and current treatment plan.    Vital Signs for the last 24 hours:  Temp:  [36.7 °C (98.1 °F)] 36.7 °C (98.1 °F)  Heart Rate:  [] 116  Resp:  [16-18] 16  BP: (119-122)/(77-78) 119/78    Scheduled Meds:  • buPROPion XL  300 mg oral Daily   • [START ON 7/18/2023] cloZAPine  100 mg oral Daily   • cloZAPine  125 mg oral q12h ELMER   • [START ON 7/18/2023] cloZAPine  150 mg oral Nightly   • docusate sodium  100 mg oral BID   • metFORMIN  500 mg oral BID with meals   • nicotine  1 patch transdermal Daily       Labs:  Selected Electrolytes  Results from last 7 days   Lab Units 07/13/23  1550   POTASSIUM mEQ/L 3.7   SODIUM mEQ/L 136   CREATININE mg/dL 0.6       Recent EKG (QTc)  Lab Results   Component Value Date     "VENTRICRATE 100 07/15/2023    QTCCALCULAT 448 07/15/2023       No results found for: VPA, LITHIUM    MENTAL STATUS EXAM  Appearance: casually dressed, somewhat untidy  Behavior: calm, aloof, disinterested  Gait and Motor: no abnormal movements and normal  Speech: normal rate/rhythm/volume  Mood: \"fine\"  Affect: neutral, disinterested; though reactive with intact humor  Associations: coherent  Thought Process: goal-directed  Thought Content: denies psychosis, none elicited or observed  Suicidality/Homicidality: denies  Judgement/Insight: partial-limited  Orientation: grossly intact, not formally assessed  Memory: grossly intact, not formally assessed  Attention: alert  Knowledge: normal  Language: normal     Assessment/Plan    * Unspecified mood (affective) disorder (CMS/[facility])  Overview  Gemma Darnell is a 22 y.o. single woman who recently started working at a  1 week ago with PMH overweight and PPH history of unspecified mood disorder vs. MDD vs. bipolar disorder vs. 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 Pt was admitted voluntarily 7/13/22 from the [facility] ED where she was brought in by her aunt. Pt came seeking admission stating she is having episodes of dissociation of unclear frequency over the last 1 week, that she doesn't feel like medications are working, that she is not suicidal, but could become suicidal if not admitted.     Outside records would help with diagnostic clarity as she has never presented as psychotic or catatonic at [facility] though is reported to have a prolonged hospitalization at [place] 04-08/2022 where she was treated with ECT and initiated on clozapine 08/2022 for reported psychosis and catatonia. Pt with multiple prior diagnoses including mood disorders, borderline " "personality disorder, eating disorder, substance use disorders. History of multiple suicide attempts including some of high lethal potential requiring ICU admission. Also with history of residential treatment, rehabs. Pt has spent most of adult life in higher levels of care, following parents' separation 2 years prior, appears to have difficulties with transitions and adjustment, and is noted to and admits she does not have good insight into antecedents for episodes.    On admission, pt presented as nonpsychotic, denied feeling depressed or suicidal, and explained having periods of \"dissociation\" for the last 1 week, with vague description of this and its frequency. The context was that she has a new job at a , after recently not being able to hold other jobs. Mother also went away for a trip. Pt denied other stressors though is actively drinking. The patient would like to understand the episodes of dissociation. She did not present as dissociated on admission evaluation. It is not yet clear what these episodes consist of or could be attributed to- e.g., psychological, medication SE, substance, psychosis. Pt denied any substance relapse though continues to drink once weekly up to 6 drinks per occasion until intoxicated, with limited insight into this being problematic. Unclear if she could be under-reporting the drinking and whether alcohol or alcohol-medication interaction could be contributing to what pt describes as \"dissociation.\" Despite sleeping well pt asked directly for Ativan for sleep- will avoid benzodiazepines. Her mother was concerned she had not been taking her medications since pt started to self-administer them over the last 3 weeks, though pt stated she has been taking the medications aside from 2 missed doses in the last week. Mother denied any recent concern for SI, psychosis, catatonia. Pt described to have josie nneka psychosis and catatonia at [place] so her current presentation suggests " "the current regimen has been helpful, and it did not appear prudent to make any big changes. Pt asking on admission to leave soon to go back to work. Contracted for safety in the hospital.    Home regimen: clozapine 125 mg q12h, bupropion  mg daily, metformin 500 mg BID with meals (though pharmacy records suggest metformin is 250 mg TID with meals)    Clozapine REMS: Patient ID [id number]; currently every 2 weeks ANC monitoring frequency    Assessment & Plan  -7/17/23 status: remains stable- no episodes of \"dissociation\" that pt reported was motivation for seeking admission; no psychosis, SI, self-injurious behaviors; reports feeling tired, consents to redistributing portion of clozapine from morning to bedtime while maintaining current home total daily dose; aunt reports witnessing episodes of gasping while pt sleeping, pt could be presenting with JEOVANY with excessive daytime sleepiness with the recent weight gain that is likely in part associated with the clozapine; as outpatient will refer to sleep medicine for JEOVANY evaluation and the metabolic health clinic for obesity evaluation; pt minimizing alcohol use and encouraged cessation and [facility]; discharge to home tomorrow, pt/family decline IOP/PHP due to past non-participation; pt to return to individual level of care  -individual, group, milieu therapy  -obtained collateral- spoke with psychiatrist, family  -counseled cessation of substances  -redistribute home clozapine from 125 mg q12h to 100 mg every morning and 150 mg every night; consider ongoing redistribution of total daily dose to nighttime given sedation  -clozapine level obtained 7/17/23; results anticipated after discharge  -pt denies constipation though recommend scheduled bowel regimen on clozapine of colace 100 mg BID to reduce risks of clozapine-associated GI hypomotility  -continued home bupropion  mg daily  -titrate home metformin from 250 mg TID with meals to 500 mg BID with " Pulmonology meals; consider continued titration up to 2000 mg tdd as an outpatient  -refer to metabolic health clinic for management of metabolic health on clozapine  -refer to sleep medicine clinic for concern for obstructive sleep apnea  -as an outpatient consider atropine drops for clozapine-induced sialorrhea    Obesity (BMI 30-39.9)  Assessment & Plan  -clozapine increases risk for obesity given metabolic SE profile  -pt already on metformin and this was titrated to 500 mg BID with meals; continued titration up to 2000 mg tdd as an outpatient may be considered  -refer to [facility] in [place] as outpatient    Excessive daytime sleepiness  Assessment & Plan  -aunt reports witnessed gasping at night  -pt at risk for JEOVANY, refer to sleep medicine as outpatient  -sedation may also be in part related to clozapine, thus prompting the redistribution of the dose as above    Alcohol use disorder, mild, abuse  Assessment & Plan  -pt with history of severe use disorder previously in rehab and now is binge drinking once weekly 6 drinks per occasion when out with friends, does not identify this as problematic  -pt denies current engagement with sober supports, e.g., AA, sponsor, [employer]; encouraged engagement  -no history of MAT  -pt denies urges/cravings  -avoid benzodiazepines  -pt's contemplation is low  -counseled reduction and cessation, and advised of risks of alcohol and interactions with medications including seizure    Nicotine use disorder  Assessment & Plan  -vapes 1 pod daily, reports rare cigarette use  -nicotine replacement  -counseled cessation  -motivational interviewing  -educated pt/family about interaction of smoking and clozapine    Severe cocaine use disorder, in sustained remission (CMS/HCC)  Assessment & Plan  -counseled continued cessation  -pt denies urges/cravings  -she is contemplative about continued cessation  -pt denies current sober supports,  e.g., NA, sponsor, [facility] [employer]; encouraged engagement    Medical conditions managed by the [facility] medicine service include: none    Commitment: 201  Observation: 15 minute checks  Disposition: discharge to home tomorrow  Outpatient: psychiatrist Dr. Nick at [facility], therapist Lona Krishnamurthy LCSW ([facility], [phone])  Housing: able to return home; living with mother/aunt prior to admission     50 total minutes were spent including direct face-to-face counseling/coordination of care, review of the medical record, and documentation. I discussed the treatment plan and the patient's progress with nursing, therapy, and social work staff as appropriate.    Plus an additional 16 minutes of psychotherapy (modality documented above)

## 2023-11-10 NOTE — PROGRESS NOTE ADULT - SUBJECTIVE AND OBJECTIVE BOX
CHIEF COMPLAINT/INTERVAL HISTORY:  Pt. seen and evaluated for diverticulitis and duodenal ulcer.  Pt. is feeling better.  Tolerating IV antibiotics.      REVIEW OF SYSTEMS:  No fever, CP, or SOB.    Vital Signs Last 24 Hrs  T(C): 36.6 (02 Mar 2018 06:44), Max: 36.8 (01 Mar 2018 19:13)  T(F): 97.8 (02 Mar 2018 06:44), Max: 98.3 (01 Mar 2018 19:13)  HR: 81 (02 Mar 2018 06:44) (81 - 119)  BP: 160/87 (02 Mar 2018 06:44) (138/80 - 160/87)  BP(mean): --  RR: 17 (02 Mar 2018 06:44) (16 - 18)  SpO2: 98% (02 Mar 2018 06:44) (97% - 98%)    PHYSICAL EXAM:  GENERAL: NAD  HEENT: EOMI, hearing normal, conjunctiva and sclera clear  Chest: CTA bilaterally, no wheezing  CV: S1S2, RRR,   GI: soft, +BS, NT/ND  Musculoskeletal: no edema  Psychiatric: affect nL, mood nL  Skin: warm and dry    LABS:                        12.4   7.0   )-----------( 180      ( 02 Mar 2018 06:45 )             36.0     03-02    145  |  113<H>  |  11  ----------------------------<  82  3.2<L>   |  24  |  1.20    Ca    7.7<L>      02 Mar 2018 06:45    TPro  5.3<L>  /  Alb  2.6<L>  /  TBili  0.7  /  DBili  x   /  AST  29  /  ALT  22  /  AlkPhos  65  03-02      Urinalysis Basic - ( 01 Mar 2018 11:08 )    Color: Yellow / Appearance: Clear / S.010 / pH: x  Gluc: x / Ketone: Small  / Bili: Negative / Urobili: Negative   Blood: x / Protein: Negative / Nitrite: Negative   Leuk Esterase: Trace / RBC: Negative /HPF / WBC 0-2   Sq Epi: x / Non Sq Epi: Negative / Bacteria: Negative        Assessment and Plan:  -Diverticulitis and possible sigmoid neoplasm:  continue Zosyn.  Clear liquid diet + NS@50cc/hr.  GI f/u  -Duodenal ulcer with possible contained perforation:  continue Protonix 40mg IV Q12h.  Surgery and GI f/u  -BPH:  continue Flomax 0.4mg PO QHS  -Anxiety:  Xanax 0.5mg PO TID and Wellbutrin 150mg PO BID  -HLD: continue statin therapy  -VTE ppx: heparin 5000 units SQ Q12h n/a

## 2023-12-01 NOTE — H&P ADULT - PROBLEM SELECTOR PLAN 3
General: denies fever, malaise, or body aches  Cardiovascular: denies murmurs, history of cardiomyopathy, palpitations, or chest pain   Respiratory: denies cough, wheeze, or SOB  Hematological: denies blood clotting disorder, history of pulmonary embolism or DVT, history of blood transfusion
likely from cerebral hypoperfusion; CT negative for acute pathology  mild QTc prolongation on EKG, without other predisposing features for syncope  will hold meclizine per neurology recommendations  monitor for arrhythmia on telemetry  check orthostatic vitals  neurology Dr. Yu  cardiology Dr. Christ Carter

## 2024-05-01 NOTE — DISCHARGE NOTE ADULT - NS AS DC STROKE DX YN
PHYSICAL MEDICINE AND REHABILITATION CONSULTATION/PREADMISSION SCREEN (PAS)      CONSULTING PHYSICIAN: Марина Horan MD   REFERRING PHYSICIAN: Jorge Alberto Giordano MD   OTHER PHYSICIANS:   PRIMARY CARE PHYSICIAN: Jorge Alberto Giordano MD      REASON FOR CONSULTATION: To assess further rehabilitation needs    CHIEF COMPLAINT: Decreased functional mobility and self-care secondary to Debility      HISTORY OF PRESENT ILLNESS:   79yo female with prior abdominal surgery who presented to [facility] 4/23/24 with complaint of epigastric abdominal pain with nausea and vomiting. CT A/P showing large laxity of the ventral abdominal wall containing multiple loops of small bowel, proximal loops are decompressed while loops in the RLQ portion herniate through the defect, mesenteric stranding, concern for closed loop bowel obstruction. Evaluated by general surgery and underwent ex lap with lysis of adhesions and small bowel resection with primary anastomosis on 4/23/24 (Dr. Del Cid). Course complicated by LAZARA on CKD secondary to ATN.    Patient worked with therapy and is below her baseline functional level.     States she is feeling ok. States she had some dizziness with ambulation earlier, but that resolved.     REVIEW OF SYSTEMS: As per HPI. All other review of 10 systems are negative.       Allergies (2) Active Reaction  ALLERGIES:   Allergen Reactions    Heparin ANAPHYLAXIS    Heparin ANAPHYLAXIS     Anaphylaxis per previous chart    Ibuprofen Other (See Comments)    Pneumococcal Vaccine Other (See Comments)    Pneumococcal Vaccines Other (See Comments)        Medications  Current Facility-Administered Medications   Medication    VANCOMYCIN - PHARMACIST MONITORED Misc    HYDROmorphone (DILAUDID) injection 0.6 mg    hydrALAZINE (APRESOLINE) injection 10 mg    hydrALAZINE (APRESOLINE) tablet 10 mg    acetaminophen (TYLENOL) tablet 1,000 mg    clopidogrel (PLAVIX) tablet 75 mg    fondaparinux (ARIXTRA) injection 2.5 mg    sodium  chloride 0.9% infusion    sodium chloride 0.9 % flush bag 25 mL    sodium chloride 0.9 % injection 2 mL    ondansetron (ZOFRAN) injection 4 mg    [Held by provider] allopurinol (ZYLOPRIM) tablet 100 mg    [Held by provider] aspirin chewable 81 mg    [Held by provider] rosuvastatin (CRESTOR) tablet 20 mg    lidocaine (LIDOCARE) 4 % patch 1 patch    nalbuphine (NUBAIN) injection 2.5 mg    naLOXone (NARCAN) injection 0.1 mg    [Held by provider] lisinopril (ZESTRIL) tablet 10 mg    metoPROLOL tartrate (LOPRESSOR) tablet 12.5 mg           PAST MEDICAL HISTORY:  Past Medical History:   Diagnosis Date    Abdominal compartment syndrome (CMD)     Bronchitis     Chronic kidney disease     Stage 3    Chronic kidney disease (CKD), stage III (moderate) (CMD)     Essential (primary) hypertension     High cholesterol     Hypertension     PAD (peripheral artery disease) (CMD)     Pneumonia          PAST SURGICAL HISTORY:  Past Surgical History:   Procedure Laterality Date    Abdomen surgery      Bypass graft Left     Cardiac catherization      Femoral bypass Bilateral 2000    Femoral bypass  02/2000    Femoral bypass      in 2010    Skin graft Left     left thigh donor            SOCIAL HISTORY:   Prior Living Situation  Information Provided By:: patient  Type of Home: House  Home Layout: Bed/Bath upstairs  # Steps to Enter: 6  # Steps in the Home: 15  Rails in Home: 1 on left going up  Lives With: Son  Receives Help From: Family  Transportation: Drives  Bathroom Shower/Tub: Walk-in shower  Bathroom Toilet: Raised  Bathroom Accessibility: Entry Level  Home Equipment: None   Alcohol  Details: Use: Current.  Frequency: 1-2 times per week.  Exercise  Details: Exercise: Never.  Substance Abuse  Details: Use: None.  Tobacco  Details: Used in Last 12 Months: No.  Use: Former smoker.; Comment(s): Quitted 40 yrs ago  Cultural/Baptism Practices  Details: Baptism or Cultural Practices: Mosque.  Baptism or Cultural Practices While in  Hospital: No.       FAMILY HISTORY:  .  Family History   Problem Relation Age of Onset    COPD Mother     Hearing Loss Mother     Heart disease Father            Insurance is: [employer]     PREMORBID LEVEL OF FUNCTIONAL: Independent for mobility and transfers.      CURRENT LEVEL OF FUNCTIONAL:   Transfers  Assistive devices: 2-wheeled walker  - Sit to stand: contact guard/touching/steadying assist, with verbal cues, with tactile cues  - Stand to sit: contact guard/touching/steadying assist, with tactile cues, with verbal cues      Functional Ambulation  - Assistance: contact guard/touching/steadying assist  - Assistive device: 2-wheeled walker  - Distance (ft):15  - Surface: even  Activities of Daily Living (ADLs)  Eating:   - Assist: set up  - Position: chair  Eating deficit: NG in place.  Grooming/Oral Hygiene:   - Grooming assist: set up  - Position: chair  Lower Body Dressing:   - Footwear:       - Assistance: stand by assist       - Position: chair       - Type: socks  - Assist needed for: verbal cueing  - Equipment: reacher and sock aid  Toileting:   - Toilet transfer:        - Assist: contact guard/touching/steadying assist (simulated)       - Device: 2-wheeled walker  Transfers  Assistive devices: 2-wheeled walker  - Sit to stand: contact guard/touching/steadying assist, with verbal cues, with tactile cues  - Stand to sit: contact guard/touching/steadying assist, with tactile cues, with verbal cues     Ambulation / Gait  - Assistive device: 2-wheeled walker  - Distance (feet unless otherwise indicated): 25  - Assist Level: contact guard/touching/steadying assist, with tactile cues and with verbal cues  - Surface: even  - Description: decreased flakita/pace, decreased step length right, step to and decreased step length left  Pt fatigues and c/o SOB after 15 ft. Requires encouragement for erect posture due to tendency to flex forward at hips and trunk.        DIET:   Dietary Orders (From admission,  onward)       Start     Ordered    04/30/24 1234  Fiber Restricted Diet  DIET EFFECTIVE NOW        Question:  Diet Modifiers  Answer:  Fiber Restricted    04/30/24 1233    04/30/24 0726  3 Times/Day w Meals; Ensure High Protein/High Protein Low Carbohydrate Supplement, Vanilla Oral Nutrition Supplement  As Directed        Question Answer Comment   Frequency 3 Times/Day w Meals    Oral Supplement Ensure High Protein/High Protein Low Carbohydrate Supplement, Vanilla        04/30/24 0726                      PHYSICAL EXAM:    Vital Signs 24 Hour     Vitals:    04/30/24 2032   BP: 137/75   Pulse: (!) 124   Resp:    Temp: 97.7 °F (36.5 °C)          General: awake, alert, no acute distress.     Psychiatric: mood is calm and cooperative    Cognition: speech is clear without dysarthria, comprehension is intact. Patient is alert and oriented to person, place, time and situation.     HEENT: atraumatic, normocephalic, anicteric,  EOMI, normal conjunctiva, mucosa moist    Neck: supple    Skin: intact, no rashes noted    Heart: regular rate no pallor     Lungs: Normal effort,  no wheezing. On nasal cannula     Abdomen: soft, non-tender, non-distended, normal bowel sounds    Extremities: no calf tenderness, no edema    Neurologic: face symmetric.  Sensation to light touch intact in bilateral upper and lower extremities throughout.   Musculoskeletal: ROM full active and PROM of upper extremities and right lower extremity    MMT 5/5 throughout except LLE which is 3/5 (baseline)       LABORATORY DATA:    Recent Labs   Lab 04/30/24  0547 04/29/24  0715 04/28/24  0711 04/25/24  2137 04/25/24  1402   WBC 11.3* 10.4 8.3   < > 14.4*   RBC 3.12* 3.63* 3.34*   < > 3.10*   HGB 8.6* 10.2* 9.5*   < > 8.6*   HCT 28.1* 32.7* 30.2*   < > 28.4*   MCV 90.1 90.1 90.4   < > 91.6   MCH 27.6 28.1 28.4   < > 27.7   MCHC 30.6* 31.2* 31.5*   < > 30.3*   RDWCV 14.6 14.2 13.9   < > 14.1    215 172   < > 146   TLYMPH  --   --   --   --  10    < > =  values in this interval not displayed.       Recent Labs   Lab 04/30/24  0547 04/29/24  0715 04/28/24  0711   SODIUM 142 141 144   CHLORIDE 111* 112* 115*   BUN 21* 18 22*   POTASSIUM 3.9 3.7 3.6   GLUCOSE 123* 154* 143*   CREATININE 0.92 0.94 0.96*   CALCIUM 8.2* 8.3* 7.8*       Recent Labs   Lab 04/30/24  0003 04/29/24  1734 04/29/24  1159 04/29/24  0609 04/29/24  0009 04/28/24  1721 04/28/24  1135 04/28/24  0602   GLUB 136* 152* 151* 160* 152* 140* 130* 147*       No results found    IMAGING:    ASSESSMENT/PLAN:   Debility   Small bowel obstruction s/p resection   LAZARA on CKD secondary to ATN   Distributive shock - resolved   Anemia - blood loss   PAD with remote complicated aortobifemoral bypass   Obesity   HTN  Leukocytosis       IMPAIRMENT GROUP CODE: 16    RECOMMENDED LEVEL OF CARE:  This patient is appropriate for daily skilled therapy and would significantly benefit from intensive therapy.  The patient will be able to actively participate in the program.    PRIOR LEVEL OF FUNCTION: Independent     EXPECTED LEVEL OF IMPROVEMENT: Mod I     ESTIMATED LENGTH OF STAY: 7-10    RISK OF CLINICAL COMPLICATIONS: The patient is at increased risk for respiratory failure, aspiration pneumonia, falls, fatigue, infection, alteration in skin integrity, pain, DVT, PE, dehydration, electrolyte imbalance, poor nutrition, altered sleep pattern, alteration in bowel/bladder control, and cardiopulmonary events. The patient's cardiopulmonary response to exercise and activity will be monitored by nursing and therapy staff with regular checks of blood pressure, pulse rate, and oxygen saturations.  Given the above listed co-morbidities and risks for complications, the patient would benefit from 24-hour availability of the rehabilitation team for closer medical monitoring and coordination of care with other specialists.  To minimize these risks for complications, the patient will receive close medical monitoring provided by the  physiatrist who will also coordinate care with multiple disciplines.    TREATMENTS NEEDED: The patient will need at least 3 hours/day, 5 days/week consisting of:  - Occupational Therapy to address endurance, activity tolerance, strength, range of motion, coordination, balance, safe transfers, and self-care.   - Physical Therapy to address endurance, strength, range of motion, safe ambulation/stair management with appropriate assistive device.  - Weekly team conferences to coordinate plan of care.    ANTICIPATED POST-REHAB DISCHARGE DESTINATION: home    ANTICIPATED POST-REHAB DISCHARGE TREATMENTS: home health    These recommendations were discussed with the patient and family and they are agreeable to the plan. Our rehab team will continue to follow the patient and admit to acute rehab pending insurance approval, medical clearance, and bed availability.     no

## 2024-07-03 NOTE — ED PROVIDER NOTE - CPE EDP ENMT NORM
Pt called--asking for a referral to different gastrologist--dr Brandon Ruff is not accepting his ins/ [employer]--pt also states he spoke to dr Doyle and was informed to continue w/his sleeping medication for 2 more weeks, in addition pt needs a referral for a stool test  
Pt informed.  
normal...

## 2025-07-30 NOTE — ED PROVIDER NOTE - ENMT, MLM
Patient states Dx with sleep apnea >15 years. Denies using CPAP machine.   Airway patent, Nasal mucosa clear. Mouth with normal mucosa.